# Patient Record
Sex: FEMALE | Race: WHITE | NOT HISPANIC OR LATINO | Employment: PART TIME | URBAN - METROPOLITAN AREA
[De-identification: names, ages, dates, MRNs, and addresses within clinical notes are randomized per-mention and may not be internally consistent; named-entity substitution may affect disease eponyms.]

---

## 2023-01-23 ENCOUNTER — PRENATAL OFFICE VISIT (OUTPATIENT)
Dept: NURSING | Facility: CLINIC | Age: 33
End: 2023-01-23

## 2023-01-23 VITALS — HEIGHT: 67 IN | BODY MASS INDEX: 25.11 KG/M2 | WEIGHT: 160 LBS

## 2023-01-23 DIAGNOSIS — Z34.00 ENCOUNTER FOR SUPERVISION OF NORMAL FIRST PREGNANCY: Primary | ICD-10-CM

## 2023-01-23 DIAGNOSIS — Z23 NEED FOR TDAP VACCINATION: ICD-10-CM

## 2023-01-23 PROBLEM — H54.7 VISION IMPAIRMENT: Status: ACTIVE | Noted: 2021-05-11

## 2023-01-23 PROBLEM — Z87.891 FORMER TOBACCO USE: Status: ACTIVE | Noted: 2019-03-14

## 2023-01-23 PROBLEM — F90.9 ADHD (ATTENTION DEFICIT HYPERACTIVITY DISORDER): Status: ACTIVE | Noted: 2020-09-22

## 2023-01-23 PROCEDURE — 99207 PR NO CHARGE NURSE ONLY: CPT

## 2023-01-23 RX ORDER — LORAZEPAM 0.5 MG/1
TABLET ORAL
COMMUNITY
Start: 2021-03-29 | End: 2023-01-23

## 2023-01-23 RX ORDER — PROPRANOLOL HYDROCHLORIDE 20 MG/1
20 TABLET ORAL
COMMUNITY
Start: 2022-03-30 | End: 2023-01-23

## 2023-01-23 RX ORDER — ESCITALOPRAM OXALATE 10 MG/1
10 TABLET ORAL
COMMUNITY
Start: 2022-01-17 | End: 2023-01-23

## 2023-01-23 RX ORDER — DEXTROAMPHETAMINE SACCHARATE, AMPHETAMINE ASPARTATE MONOHYDRATE, DEXTROAMPHETAMINE SULFATE AND AMPHETAMINE SULFATE 5; 5; 5; 5 MG/1; MG/1; MG/1; MG/1
20 CAPSULE, EXTENDED RELEASE ORAL DAILY
Status: ON HOLD | COMMUNITY
End: 2023-09-11
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
## 2023-02-07 LAB
ABO/RH(D): NORMAL
ANTIBODY SCREEN: NEGATIVE
SPECIMEN EXPIRATION DATE: NORMAL

## 2023-02-08 ENCOUNTER — ANCILLARY PROCEDURE (OUTPATIENT)
Dept: ULTRASOUND IMAGING | Facility: CLINIC | Age: 33
End: 2023-02-08
Payer: COMMERCIAL

## 2023-02-08 ENCOUNTER — PRENATAL OFFICE VISIT (OUTPATIENT)
Dept: OBGYN | Facility: CLINIC | Age: 33
End: 2023-02-08
Payer: COMMERCIAL

## 2023-02-08 ENCOUNTER — TRANSCRIBE ORDERS (OUTPATIENT)
Dept: MATERNAL FETAL MEDICINE | Facility: CLINIC | Age: 33
End: 2023-02-08

## 2023-02-08 VITALS
WEIGHT: 171 LBS | HEART RATE: 78 BPM | OXYGEN SATURATION: 100 % | DIASTOLIC BLOOD PRESSURE: 62 MMHG | SYSTOLIC BLOOD PRESSURE: 103 MMHG | BODY MASS INDEX: 26.78 KG/M2

## 2023-02-08 DIAGNOSIS — O26.90 PREGNANCY RELATED CONDITION, ANTEPARTUM: Primary | ICD-10-CM

## 2023-02-08 DIAGNOSIS — O09.511 SUPERVISION OF HIGH RISK ELDERLY PRIMIGRAVIDA IN FIRST TRIMESTER: Primary | ICD-10-CM

## 2023-02-08 DIAGNOSIS — Z34.00 ENCOUNTER FOR SUPERVISION OF NORMAL FIRST PREGNANCY: ICD-10-CM

## 2023-02-08 LAB
ALBUMIN UR-MCNC: NEGATIVE MG/DL
APPEARANCE UR: CLEAR
BILIRUB UR QL STRIP: NEGATIVE
COLOR UR AUTO: YELLOW
ERYTHROCYTE [DISTWIDTH] IN BLOOD BY AUTOMATED COUNT: 13 % (ref 10–15)
GLUCOSE UR STRIP-MCNC: NEGATIVE MG/DL
HCT VFR BLD AUTO: 37.3 % (ref 35–47)
HGB BLD-MCNC: 12.8 G/DL (ref 11.7–15.7)
HGB UR QL STRIP: NEGATIVE
KETONES UR STRIP-MCNC: NEGATIVE MG/DL
LEUKOCYTE ESTERASE UR QL STRIP: NEGATIVE
MCH RBC QN AUTO: 30.9 PG (ref 26.5–33)
MCHC RBC AUTO-ENTMCNC: 34.3 G/DL (ref 31.5–36.5)
MCV RBC AUTO: 90 FL (ref 78–100)
NITRATE UR QL: NEGATIVE
PH UR STRIP: 7 [PH] (ref 5–7)
PLATELET # BLD AUTO: 270 10E3/UL (ref 150–450)
RBC # BLD AUTO: 4.14 10E6/UL (ref 3.8–5.2)
SP GR UR STRIP: 1.01 (ref 1–1.03)
UROBILINOGEN UR STRIP-ACNC: 0.2 E.U./DL
WBC # BLD AUTO: 9.1 10E3/UL (ref 4–11)

## 2023-02-08 PROCEDURE — 36415 COLL VENOUS BLD VENIPUNCTURE: CPT | Performed by: OBSTETRICS & GYNECOLOGY

## 2023-02-08 PROCEDURE — 86780 TREPONEMA PALLIDUM: CPT | Performed by: OBSTETRICS & GYNECOLOGY

## 2023-02-08 PROCEDURE — 87340 HEPATITIS B SURFACE AG IA: CPT | Performed by: OBSTETRICS & GYNECOLOGY

## 2023-02-08 PROCEDURE — 86803 HEPATITIS C AB TEST: CPT | Performed by: OBSTETRICS & GYNECOLOGY

## 2023-02-08 PROCEDURE — 86901 BLOOD TYPING SEROLOGIC RH(D): CPT | Performed by: OBSTETRICS & GYNECOLOGY

## 2023-02-08 PROCEDURE — 81003 URINALYSIS AUTO W/O SCOPE: CPT | Performed by: OBSTETRICS & GYNECOLOGY

## 2023-02-08 PROCEDURE — 99207 PR FIRST OB VISIT: CPT | Performed by: OBSTETRICS & GYNECOLOGY

## 2023-02-08 PROCEDURE — 86762 RUBELLA ANTIBODY: CPT | Performed by: OBSTETRICS & GYNECOLOGY

## 2023-02-08 PROCEDURE — 86850 RBC ANTIBODY SCREEN: CPT | Performed by: OBSTETRICS & GYNECOLOGY

## 2023-02-08 PROCEDURE — 76801 OB US < 14 WKS SINGLE FETUS: CPT | Performed by: OBSTETRICS & GYNECOLOGY

## 2023-02-08 PROCEDURE — 87389 HIV-1 AG W/HIV-1&-2 AB AG IA: CPT | Performed by: OBSTETRICS & GYNECOLOGY

## 2023-02-08 PROCEDURE — 85027 COMPLETE CBC AUTOMATED: CPT | Performed by: OBSTETRICS & GYNECOLOGY

## 2023-02-08 PROCEDURE — 86900 BLOOD TYPING SEROLOGIC ABO: CPT | Performed by: OBSTETRICS & GYNECOLOGY

## 2023-02-08 PROCEDURE — 87591 N.GONORRHOEAE DNA AMP PROB: CPT | Performed by: OBSTETRICS & GYNECOLOGY

## 2023-02-08 PROCEDURE — 87086 URINE CULTURE/COLONY COUNT: CPT | Performed by: OBSTETRICS & GYNECOLOGY

## 2023-02-08 PROCEDURE — 87491 CHLMYD TRACH DNA AMP PROBE: CPT | Performed by: OBSTETRICS & GYNECOLOGY
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
## 2023-02-09 LAB
C TRACH DNA SPEC QL NAA+PROBE: NEGATIVE
HBV SURFACE AG SERPL QL IA: NONREACTIVE
HCV AB SERPL QL IA: NONREACTIVE
HIV 1+2 AB+HIV1 P24 AG SERPL QL IA: NONREACTIVE
N GONORRHOEA DNA SPEC QL NAA+PROBE: NEGATIVE
RUBV IGG SERPL QL IA: 9.58 INDEX
RUBV IGG SERPL QL IA: POSITIVE
T PALLIDUM AB SER QL: NONREACTIVE

## 2023-02-10 LAB — BACTERIA UR CULT: NORMAL

## 2023-02-27 ENCOUNTER — PRE VISIT (OUTPATIENT)
Dept: MATERNAL FETAL MEDICINE | Facility: CLINIC | Age: 33
End: 2023-02-27
Payer: COMMERCIAL

## 2023-03-02 ENCOUNTER — OFFICE VISIT (OUTPATIENT)
Dept: MATERNAL FETAL MEDICINE | Facility: CLINIC | Age: 33
End: 2023-03-02
Attending: OBSTETRICS & GYNECOLOGY
Payer: COMMERCIAL

## 2023-03-02 ENCOUNTER — APPOINTMENT (OUTPATIENT)
Dept: LAB | Facility: CLINIC | Age: 33
End: 2023-03-02
Attending: OBSTETRICS & GYNECOLOGY
Payer: COMMERCIAL

## 2023-03-02 DIAGNOSIS — Z31.430 ENCOUNTER OF FEMALE FOR TESTING FOR GENETIC DISEASE CARRIER STATUS FOR PROCREATIVE MANAGEMENT: ICD-10-CM

## 2023-03-02 DIAGNOSIS — O09.511 PRIMIGRAVIDA OF ADVANCED MATERNAL AGE IN FIRST TRIMESTER: Primary | ICD-10-CM

## 2023-03-02 DIAGNOSIS — O26.90 PREGNANCY RELATED CONDITION, ANTEPARTUM: ICD-10-CM

## 2023-03-02 DIAGNOSIS — O09.511 PRIMIGRAVIDA OF ADVANCED MATERNAL AGE IN FIRST TRIMESTER: ICD-10-CM

## 2023-03-02 PROCEDURE — 96040 HC GENETIC COUNSELING, EACH 30 MINUTES: CPT

## 2023-03-02 PROCEDURE — 36415 COLL VENOUS BLD VENIPUNCTURE: CPT

## 2023-03-02 NOTE — PROGRESS NOTES
[facility]  Genetic Counseling Consult    Patient:  Zara Kaminski YOB: 1990   Date of Service:  3/02/23   MRN: [id number]    Zara was seen at the [facility] for genetic consultation. The indication for genetic counseling is advanced maternal age. The patient was accompanied to this visit by their partner, Ej. The patient and their accompanied individual is wearing a mask due to current [facility] policies.      The session was conducted in English.      IMPRESSION/ PLAN   1. Zara has not had genetic screening in this pregnancy but elected to have screening today.     2. During today's [facility] visit, Zara had a blood draw for NIPS through [employer]. The NIPS screens for trisomy 21, 18, and 13 and the patient opted to screen for sex chromosome aneuploidies, including reported fetal sex. Results are expected in 7-10 days. The patient will be called with results and if they do not answer they requested a detailed message with results on their voicemail, including the predicted fetal sex information.  Patient was informed that results, including fetal sex, will be available in [employer].     3. The patient and her partner also had a blood draw for carrier screening. Results are expected in 2-3 weeks. The patient will be called with results and if they do not answer they requested a detailed message with results on their voicemail. They requested that we call Zara with results once both are available. Consent to communicate was signed. Patient was informed that results will be available in [employer].     4. Zara did not have an ultrasound today.     5. Further recommendations include a level II ultrasound with MFM and maternal serum AFP only screening for neural tube defects, if desired (quad screen should NOT be performed). The level II ultrasound has not been scheduled, but was ordered today.    PREGNANCY HISTORY  "  /Parity:         Zara's pregnancy history is insignificant    CURRENT PREGNANCY   Current Age: 33 year old   Age at Delivery: 33 year old  SABINA: 2023, by Last Menstrual Period                                   Gestational Age: 13w0d  This pregnancy is a single gestation.   This pregnancy was conceived spontaneously.   Zara declines bleeding, complications, illnesses/fevers, and exposure concerns. She reports that she is taking Adderall XR, prenatal vitamins, iron, tylenol, and Unisom.    MEDICAL HISTORY   Zara s reported medical history is not expected to impact pregnancy management or risks to fetal development.       FAMILY HISTORY   A three-generation pedigree was obtained today and is scanned under the \"Media\" tab in Epic. The family history was reported by Zara and their partner.    The following significant findings were reported today:   Zara reports that her maternal aunt was diagnosed with breast cancer in her early thirties. She reports that her aunt passed away when Zara was in high school. She believes her mother has had genetic testing, but is unsure. She says she will follow-up with her mother. We discussed the family history of breast cancer briefly. Cancer most often occurs by chance, however some families seem to develop cancer more frequently than expected. Everyone has a risk to develop cancer, but individuals may be at an increased risk to develop cancer based on their family history.We discussed that breast cancer <50y is rare and can be associated with inherited cancer predisposition syndromes. Genetic counseling is available for cancer syndromes. Cancer family history, even without genetic testing, can change cancer screening recommendations for family members and aid in insurance coverage for access to them as well. The most informative individuals to complete cancer genetic counseling and genetic testing are those with a personal history of cancer or those closely " "related to the affected individuals. This may be Zara's mother. If the family wants more information they can contact the [facility] Cancer Risk Management Program ([phone]). Physicians can also make referrals at [email or web address] or, if within the [place] system, through [facility] referral for \"Cancer Risk Mgmt/Cancer Genetic Counseling\". Due to Zara's family history of breast cancer it may be reasonable to begin early screening mammograms. Screening mammograms are usually not recommended during pregnancy or while breast feeding including up to six months after. Zara is encouraged to discuss this family history with her medical provider to ensure that screening begins at an appropriate age.  Zara reports that her mother (70y) had surgery to remove her gallbladder.    Zara reports that her father has a thyroid goiter and had skin cancer diagnosed at 68y.    Ej (37y), Zara's partner, reports that he is healthy.    Ej reports that his mother (75y) has had skin cancer.    Ej reports that his father (74y) has cirrhosis of the liver.    Ej reports that his paternal half-sister  at 36 from complications of substance use disorder.    Otherwise, the reported family history is unremarkable for multiple miscarriages, stillbirths, birth defects, intellectual disabilities, known genetic conditions, and consanguinity.       CARRIER SCREENING   Expanded carrier screening is available to screen for autosomal recessive conditions and X-linked conditions in a large list of genes. Autosomal recessive conditions happen when a mutation has been inherited from the egg and sperm and include conditions like cystic fibrosis, thalassemia, hearing loss, spinal muscular atrophy, and more. X-linked conditions happen when a mutation has been inherited from the egg and include conditions like fragile X syndrome.  screening was also reviewed. [place]  " Screening    As part of our conversation on carrier screening and how common or rare these condition are, we discussed the patient is of - [place] and [place] ancestry and the partner is of [place] ancestry.    The patient elected to pursue carrier screening today. We discussed the following:     Carrier screening does not test the pregnancy but gives a risk assessment for the pregnancy and future pregnancies to have the condition    There are different size panels or list of conditions for carrier screening. The patient elected for [employer] comprehensive panel of 558 genes including fragile X syndrome    Some conditions cause health problems for carriers    Carrier screening does not test for all genetic and health conditions or risk factors    There are limitations to current technology and results may be updated at a later date    The results typically take 2-3 weeks. They will be available in EPIC and routed to the referring OB provider. The patient can view them in [employer] and the lab's patient portal.    The patient's partner also elected carrier screening for the [employer] comprehensive panel    If an individual is a carrier, family members could be as well. The patient is encouraged to share this information with relatives.    The lab will communicate the out-of-pocket cost with the patient and will also provide an option to switch to self-pay. The default is billing through insurance, and it is the patient's responsibility to respond to the communication if they would like to switch to self-pay.         RISK ASSESSMENT FOR CHROMOSOME CONDITIONS   We explained that the risk for fetal chromosome abnormalities increases with maternal age. We discussed specific features of common chromosome abnormalities, including Down syndrome, trisomy 13, trisomy 18, and sex chromosome trisomies.      At age 33 at midtrimester, the risk to have a baby with Down syndrome is 1 in 274.     At age 33 at midtrimester, the  risk to have a baby with any chromosome abnormality is 1 in 135.     Zara has not had genetic screening in this pregnancy but elected to have screening today.  She elected NIPT.    GENETIC TESTING OPTIONS   Genetic testing during a pregnancy includes screening and diagnostic procedures.      Screening tests are non-invasive which means no risk to the pregnancy and includes ultrasounds and blood work. The benefits and limitations of screening were reviewed. Screening tests provide a risk assessment (chance) specific to the pregnancy for certain fetal chromosome abnormalities but cannot definitively diagnose or exclude a fetal chromosome abnormality. Follow-up genetic counseling and consideration of diagnostic testing is recommended with any abnormal screening result. Diagnostic testing during a pregnancy is more certain and can test for more conditions. However, the tests do have a risk of miscarriage that requires careful consideration. These tests can detect fetal chromosome abnormalities with greater than 99% certainty. Results can be compromised by maternal cell contamination or mosaicism and are limited by the resolution of current genetic testing technology.     There is no screening or diagnostic test that detects all forms of birth defects or intellectual disability.     We discussed the following screening options:   Non-invasive prenatal testing (NIPT)    Also called cell-free DNA screening because it detects chromosomes from the placenta in the pregnant person's blood    Can be done any time after 10 weeks gestation    Screens for trisomy 21, trisomy 18, trisomy 13, and sex chromosome aneuploidies    Cannot screen for open neural tube defects, maternal serum AFP after 15 weeks is recommended      We discussed the following ultrasound options:  Comprehensive level II ultrasound (Fetal Anatomy Ultrasound)    Ultrasound done between 18-20 weeks gestation    Screens for major birth defects and markers for  aneuploidy (like trisomy 21 and trisomy 18)    Includes looking at the fetus/baby's growth, heart, organs (stomach, kidneys), placenta, and amniotic fluid      We discussed the following diagnostic options:   Chorionic villus sampling (CVS)    Invasive diagnostic procedure done between 10w0d and 13w6d    The procedure collects a small sample from the placenta for the purpose of chromosomal testing and/or other genetic testing    Diagnostic result; more than 99% sensitivity for fetal chromosome abnormalities    Cannot screen for open neural tube defects, maternal serum AFP after 15 weeks is recommended    Amniocentesis    Invasive diagnostic procedure done after 15 weeks gestation    The procedure collects a small sample of amniotic fluid for the purpose of chromosomal testing and/or other genetic testing    Diagnostic result; more than 99% sensitivity for fetal chromosome abnormalities    Testing for AFP in the amniotic fluid can test for open neural tube defects        It was a pleasure to be involved with Zara [facility]. Face-to-face time of the meeting was 45 minutes.    Gi Magallanes, GC, MS, [facility]  Certified and [place] Licensed Genetic Counselor  [facility]  Maternal Fetal Medicine  Office: [phone]  [facility]: [phone]   Fax: [phone]  [facility]

## 2023-03-09 ENCOUNTER — MEDICAL CORRESPONDENCE (OUTPATIENT)
Dept: HEALTH INFORMATION MANAGEMENT | Facility: CLINIC | Age: 33
End: 2023-03-09
Payer: COMMERCIAL

## 2023-03-09 ENCOUNTER — TELEPHONE (OUTPATIENT)
Dept: MATERNAL FETAL MEDICINE | Facility: CLINIC | Age: 33
End: 2023-03-09
Payer: COMMERCIAL

## 2023-03-09 DIAGNOSIS — O09.511 ADVANCED MATERNAL AGE, PRIMIGRAVIDA IN FIRST TRIMESTER, ANTEPARTUM: Primary | ICD-10-CM

## 2023-03-09 LAB — SCANNED LAB RESULT: NORMAL

## 2023-03-09 NOTE — TELEPHONE ENCOUNTER
March 9th, 2023    I called Zara and disclosed that the non-invasive prenatal testing (NIPT) we sent to [facility] failed. [facility] reports that it failed due to a laboratory processing issue and that these types of failures are not expected to be related to specimen collection or specimen-specific quality metrics.     I discussed that the sample can be redrawn and sent to [facility]. Additionally, some patients may choose to send to a different lab when a sample fails. [facility] should [facility] prefer. Otherwise, the order can be cancelled.    Zara is scheduled for a prenatal visit at [place] tomorrow. I said she could have it redrawn at that time should she desire.    I asked her to call me back to let me know her preference.    Gi Magallanes MS, [facility]  Licensed Genetic Counselor  [facility]  Pager: [phone]  Office: [phone]

## 2023-03-09 NOTE — TELEPHONE ENCOUNTER
March 9th, 2023    Zara returned my call and reported that she would like a redraw for NIPT that failed analysis at [facility]. She said she has an OB appointment tomorrow, and will plan to go to the outpatient lab before her appointment to have her blood drawn.    Gi Magallanes MS, [facility]  Licensed Genetic Counselor  [facility]  Pager: [phone]  Office: [phone]

## 2023-03-10 ENCOUNTER — PRENATAL OFFICE VISIT (OUTPATIENT)
Dept: OBGYN | Facility: CLINIC | Age: 33
End: 2023-03-10
Payer: COMMERCIAL

## 2023-03-10 ENCOUNTER — LAB (OUTPATIENT)
Dept: LAB | Facility: CLINIC | Age: 33
End: 2023-03-10
Payer: COMMERCIAL

## 2023-03-10 VITALS
SYSTOLIC BLOOD PRESSURE: 117 MMHG | WEIGHT: 174.3 LBS | BODY MASS INDEX: 27.3 KG/M2 | DIASTOLIC BLOOD PRESSURE: 73 MMHG | HEART RATE: 71 BPM

## 2023-03-10 DIAGNOSIS — O09.511 ADVANCED MATERNAL AGE, PRIMIGRAVIDA IN FIRST TRIMESTER, ANTEPARTUM: ICD-10-CM

## 2023-03-10 DIAGNOSIS — Z34.02 ENCOUNTER FOR SUPERVISION OF NORMAL FIRST PREGNANCY IN SECOND TRIMESTER: Primary | ICD-10-CM

## 2023-03-10 PROCEDURE — 36415 COLL VENOUS BLD VENIPUNCTURE: CPT

## 2023-03-10 PROCEDURE — 99207 PR PRENATAL VISIT: CPT | Performed by: OBSTETRICS & GYNECOLOGY

## 2023-03-10 NOTE — PROGRESS NOTES
14w1d overall feeling ok, nausea improving, but still taking unisom which helps.  Traveled to [place] since last visit and had a great time.  Had NIPT drawn, but lab processing error and had to be redrawn today.  Was not scheduled for level 2, will order today.  AFP next visit.  RTC 4 weeks  jlp

## 2023-03-15 ENCOUNTER — TELEPHONE (OUTPATIENT)
Dept: MATERNAL FETAL MEDICINE | Facility: CLINIC | Age: 33
End: 2023-03-15
Payer: COMMERCIAL

## 2023-03-15 LAB
SCANNED LAB RESULT: ABNORMAL
SCANNED LAB RESULT: NORMAL

## 2023-03-15 NOTE — TELEPHONE ENCOUNTER
March 15, 2023    I left a message for Zara regarding her NIPT and carrier screening results.     NIPT results indicate NO ANEUPLOIDY DETECTED for chromosomes 21, 18, 13, or the sex chromosomes (XY).     This puts her current pregnancy at low risk for Down syndrome, trisomy 18, trisomy 13 and sex chromosome abnormalities. This test is reported to have the following sensitivities: Down syndrome- >99.9%, trisomy 18- >99.9%, and trisomy 13- >99.9%. Although these results are reassuring, this does not replace a standard chromosome analysis from a chorionic villus sampling or amniocentesis.     Level II ultrasound is recommended, given AMA.     MSAFP is the appropriate second trimester screening test for open neural tube defects; the maternal quad screen is not recommended.    I reviewed the results of her and her partner, Ej's, carrier screening for the [employer] comprehensive carrier screening which assessed 558 genes. The couple was not found to be carriers for any of the same conditions. They are encouraged to share results with family members for their own consideration of carrier screening. Zara was found to be a carrier for [id number] conditions. 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    [id number] conditions (WNT10A: c.682T>A ([id number].[id number])):    These conditions  include autosomal recessive odonto-onycho-dermal dysplasia (OODD) and Wwfösr-Jhqqtb-Tjkdcrvq syndrome (SSPS) and autosomal dominant isolated tooth agenesis. People who are carriers of the autosomal recessive conditions may be at risk to develop the autosomal dominant condition.     OODD and SSPS refer to a spectrum of features associated with ectodermal dysplasia (ED), which causes abnormal development of the skin, hair, nails, teeth, and sweat glands. Isolated tooth agenesis results in the absence of one or more teeth, typically secondary teeth. Some individuals with tooth agenesis can have mild symptoms of ED.    This  particular variant is a low penetrance variant, meaning that not all individuals with this variant will have associated symptoms.     Since Ej was NOT identified to be a carrier of this condition, the residual reproductive risk for the autosomal recessive conditions is 1 in 131,600.    POLG-related conditions (c.752C>T ([id number])):    Mutations in this gene are associated with several different conditions with different inheritance patters. Recessive forms of this condition include Alpers-Huttenlocher syndrome (AHS), childhood myocerebrohepatopathy spectrum (MCHS),    myoclonic epilepsy myopathy sensory ataxia (MEMSA), ataxia neuropathy spectrum (ANS), and progressive external ophthalmoplegia (arPEO). The dominant form of the condition is progressive external ophthalmoplegia with mitochondrial DNA deletions (adPEO).    This variant is associated with autosomal recessive disease.     Symptoms can include seizures, psychomotor regression, liver failure, developmental delay, pancreatitis, hearing loss, ataxia, ophthalmoplegia, and others.     Since Zara was NOT identified to be a carrier of this condition, the couple's residual reproductive risk is 1 in 8,960.      Her results are available in her Epic chart for her primary OB to review. Zara and Ej can call me with any questions.    Gi Magallanes MS, [facility]  Licensed Genetic Counselor  [facility]  Pager: [phone]  Office: [phone]

## 2023-04-07 ENCOUNTER — PRENATAL OFFICE VISIT (OUTPATIENT)
Dept: MIDWIFE SERVICES | Facility: CLINIC | Age: 33
End: 2023-04-07
Payer: COMMERCIAL

## 2023-04-07 ENCOUNTER — OFFICE VISIT (OUTPATIENT)
Dept: MATERNAL FETAL MEDICINE | Facility: CLINIC | Age: 33
End: 2023-04-07
Attending: OBSTETRICS & GYNECOLOGY
Payer: COMMERCIAL

## 2023-04-07 ENCOUNTER — HOSPITAL ENCOUNTER (OUTPATIENT)
Dept: ULTRASOUND IMAGING | Facility: CLINIC | Age: 33
Discharge: HOME | End: 2023-04-07
Attending: OBSTETRICS & GYNECOLOGY
Payer: COMMERCIAL

## 2023-04-07 VITALS
HEART RATE: 73 BPM | BODY MASS INDEX: 28.58 KG/M2 | WEIGHT: 182.5 LBS | DIASTOLIC BLOOD PRESSURE: 72 MMHG | SYSTOLIC BLOOD PRESSURE: 113 MMHG

## 2023-04-07 DIAGNOSIS — Z36.3 ANTENATAL SCREENING FOR MALFORMATION USING ULTRASONICS: ICD-10-CM

## 2023-04-07 DIAGNOSIS — O09.512 AMA (ADVANCED MATERNAL AGE) PRIMIGRAVIDA 35+, SECOND TRIMESTER: Primary | ICD-10-CM

## 2023-04-07 DIAGNOSIS — D22.9 CHANGE IN SKIN MOLE: ICD-10-CM

## 2023-04-07 DIAGNOSIS — O09.511 PRIMIGRAVIDA OF ADVANCED MATERNAL AGE IN FIRST TRIMESTER: ICD-10-CM

## 2023-04-07 DIAGNOSIS — O44.42 LOW LYING PLACENTA NOS OR WITHOUT HEMORRHAGE, SECOND TRIMESTER: ICD-10-CM

## 2023-04-07 PROCEDURE — 76811 OB US DETAILED SNGL FETUS: CPT | Mod: 26 | Performed by: OBSTETRICS & GYNECOLOGY

## 2023-04-07 PROCEDURE — 99207 PR PRENATAL VISIT: CPT | Performed by: ADVANCED PRACTICE MIDWIFE

## 2023-04-07 PROCEDURE — 76811 OB US DETAILED SNGL FETUS: CPT

## 2023-04-07 RX ORDER — PRENATAL VIT/IRON FUM/FOLIC AC 27MG-0.8MG
1 TABLET ORAL DAILY
COMMUNITY

## 2023-04-07 NOTE — PROGRESS NOTES
18w1d  MD patient, unknowingly scheduled with [facility]. Here with Ej today after MFM US. Discussed results - unable to visualize spine due to fetal position and low lying placenta. Follow up scheduled for 5/5. Pt has concern about mole that is changing in appearance after nicking it with razor while shaving. Mole in groin area, appears benign. Derm referral entered as patient has many moles so would benefit from routine skin check. No contractions, cramping, LOF or bleeding. RTC in 4 weeks with MFM US. MML

## 2023-04-07 NOTE — PROGRESS NOTES
Please refer to ultrasound report under 'Imaging' Studies of 'Chart Review' tabs.    Chava Hernandez M.D.

## 2023-04-17 ENCOUNTER — TELEPHONE (OUTPATIENT)
Dept: MATERNAL FETAL MEDICINE | Facility: CLINIC | Age: 33
End: 2023-04-17
Payer: COMMERCIAL

## 2023-04-17 NOTE — TELEPHONE ENCOUNTER
4/17/2023    Zara called clinic and asked to speak with a genetic counselor regarding billing for her recent screening through [employer]. Zara and her  received bills over $1000 each for their carrier screening, and over $600 for NIPS.  Zara was able to contact [employer] and got the bill for NIPS switched to self pay for $99, but was told they cannot offer the self pay pricing for carrier screening. Zara did apply for financial assistance through [employer] as a part of that call but wanted assistance in clarifying the cost of their testing.  Genetic counseling will reach out to our [facility] lab representative on their behalf and follow up.     Ramsey Machado MS, [facility]  Licensed Genetic Counselor  Phone: [phone]  Pager: [phone]

## 2023-04-19 ENCOUNTER — TELEPHONE (OUTPATIENT)
Dept: MATERNAL FETAL MEDICINE | Facility: CLINIC | Age: 33
End: 2023-04-19
Payer: COMMERCIAL

## 2023-04-19 NOTE — TELEPHONE ENCOUNTER
"April 19, 2023    I called Zara to follow-up regarding the communication myself and Ramsey Machado received today from [employer]. Zara had called on Friday and talked to Ramsey Machado about large bills from [employer] for the cost of carrier screening for her and her partner in her pregnancy. Rasheed, our [employer] representative, emailed and said,    \"Zara Conteh [birth year] [id number]: On 3/8 we sent out a BI Range of $750-$800 via text [phone] and email [email or web address] (I ve submitted a request to bring her bill within the estimated BI range $750-$800) The patient had an outstanding deductible  Ej Burgess 5/27/1987 [id number]: We did NOT send out a BI, I ve submitted a request to honor the self pay $100 for him\"    I communicated this information to Zara. We discussed that the cost of her testing would go towards her deductible. Zara said she felt misled about the cost of testing. I apologized that she feels this way. We discussed that per the original genetic counseling note documentation, \"The lab will communicate the out-of-pocket cost with the patient and will also provide an option to switch to self-pay. The default is billing through insurance, and it is the patient's responsibility to respond to the communication if they would like to switch to self-pay.\" Zara said she never received the BI. I encouraged her to discuss with [employer] directly as the billing is not through [facility]. I asked our [employer] representative to provide Zara with records of the BI being sent to her, if possible.    I encouraged Zara to contact me with any questions.    Gi Magallanes MS, [facility]  Licensed Genetic Counselor   [facility] [place]  Pager: [phone]  Office: [phone]   "

## 2023-04-20 ENCOUNTER — TELEPHONE (OUTPATIENT)
Dept: MATERNAL FETAL MEDICINE | Facility: CLINIC | Age: 33
End: 2023-04-20
Payer: COMMERCIAL

## 2023-04-20 NOTE — TELEPHONE ENCOUNTER
"April 20, 2023    I asked our [employer] representative to provide Zara with records of the BI for carrier screening being sent to her, if possible. Our [employer] representative responded,    \"Hi Gi,     I reached out to my Lead for help and was able to honor the self pay $250. Billing was unable to share the screenshot with me. We texted ([phone]) and emailed ([email or web address]) on 3/8 at 4:47pm. The option to switch to self pay was closed late on 3/8, this is an error since her carrier reported out on 3/13. This is an out of the ordinary incident. The only thing that we could possibly link it to was the patient s NIPS failure that happened late in the day on 3/8? Apologies for the inconvenience and please let her know that she will be receiving a new statement in a few weeks. Hope this is helpful and please let me know if I can help with anything else.\"    I communicated this with Zara and apologized for the frustration she experienced regarding the billing process.    Gi Magallanes MS, [facility]  Licensed Genetic Counselor  [facility]  Pager: [phone]  Office: [phone]   "

## 2023-04-23 ENCOUNTER — HEALTH MAINTENANCE LETTER (OUTPATIENT)
Age: 33
End: 2023-04-23

## 2023-05-05 ENCOUNTER — PRENATAL OFFICE VISIT (OUTPATIENT)
Dept: OBGYN | Facility: CLINIC | Age: 33
End: 2023-05-05
Payer: COMMERCIAL

## 2023-05-05 ENCOUNTER — OFFICE VISIT (OUTPATIENT)
Dept: MATERNAL FETAL MEDICINE | Facility: CLINIC | Age: 33
End: 2023-05-05
Attending: OBSTETRICS & GYNECOLOGY
Payer: COMMERCIAL

## 2023-05-05 ENCOUNTER — HOSPITAL ENCOUNTER (OUTPATIENT)
Dept: ULTRASOUND IMAGING | Facility: CLINIC | Age: 33
Discharge: HOME | End: 2023-05-05
Attending: OBSTETRICS & GYNECOLOGY
Payer: COMMERCIAL

## 2023-05-05 VITALS
HEART RATE: 55 BPM | OXYGEN SATURATION: 100 % | BODY MASS INDEX: 29.54 KG/M2 | WEIGHT: 188.6 LBS | SYSTOLIC BLOOD PRESSURE: 109 MMHG | DIASTOLIC BLOOD PRESSURE: 64 MMHG

## 2023-05-05 DIAGNOSIS — O09.891 MEDICATION EXPOSURE DURING FIRST TRIMESTER OF PREGNANCY: Primary | ICD-10-CM

## 2023-05-05 DIAGNOSIS — Z34.02 ENCOUNTER FOR SUPERVISION OF NORMAL FIRST PREGNANCY IN SECOND TRIMESTER: Primary | ICD-10-CM

## 2023-05-05 PROCEDURE — 99207 PR PRENATAL VISIT: CPT | Performed by: OBSTETRICS & GYNECOLOGY

## 2023-05-05 PROCEDURE — 76816 OB US FOLLOW-UP PER FETUS: CPT | Mod: 26 | Performed by: OBSTETRICS & GYNECOLOGY

## 2023-05-05 PROCEDURE — 76816 OB US FOLLOW-UP PER FETUS: CPT

## 2023-05-05 NOTE — PROGRESS NOTES
Please see the imaging tab for details of the ultrasound performed today.    Jane Londono MD  Specialist in Maternal-Fetal Medicine

## 2023-06-02 ENCOUNTER — PRENATAL OFFICE VISIT (OUTPATIENT)
Dept: OBGYN | Facility: CLINIC | Age: 33
End: 2023-06-02
Payer: COMMERCIAL

## 2023-06-02 VITALS
DIASTOLIC BLOOD PRESSURE: 70 MMHG | HEART RATE: 56 BPM | SYSTOLIC BLOOD PRESSURE: 108 MMHG | WEIGHT: 190 LBS | OXYGEN SATURATION: 99 % | BODY MASS INDEX: 29.76 KG/M2

## 2023-06-02 DIAGNOSIS — Z34.02 ENCOUNTER FOR SUPERVISION OF NORMAL FIRST PREGNANCY IN SECOND TRIMESTER: Primary | ICD-10-CM

## 2023-06-02 LAB
GLUCOSE 1H P 50 G GLC PO SERPL-MCNC: 112 MG/DL (ref 70–129)
HGB BLD-MCNC: 11.8 G/DL (ref 11.7–15.7)
HOLD SPECIMEN: NORMAL

## 2023-06-02 PROCEDURE — 99207 PR PRENATAL VISIT: CPT | Performed by: OBSTETRICS & GYNECOLOGY

## 2023-06-02 PROCEDURE — 36415 COLL VENOUS BLD VENIPUNCTURE: CPT | Performed by: OBSTETRICS & GYNECOLOGY

## 2023-06-02 PROCEDURE — 82950 GLUCOSE TEST: CPT | Performed by: OBSTETRICS & GYNECOLOGY

## 2023-06-02 RX ORDER — BREAST PUMP
1 EACH MISCELLANEOUS CONTINUOUS PRN
Qty: 1 EACH | Refills: 0 | Status: SHIPPED | OUTPATIENT
Start: 2023-06-02

## 2023-06-02 RX ORDER — DEXTROAMPHETAMINE SACCHARATE, AMPHETAMINE ASPARTATE, DEXTROAMPHETAMINE SULFATE AND AMPHETAMINE SULFATE 2.5; 2.5; 2.5; 2.5 MG/1; MG/1; MG/1; MG/1
TABLET ORAL
Status: ON HOLD | COMMUNITY
Start: 2023-03-22 | End: 2023-09-11

## 2023-06-02 NOTE — ADDENDUM NOTE
Addended by: NEGRITO ULLOA on: 6/2/2023 02:11 PM     Modules accepted: Orders    
3/5 throughtout on extremities/grossly assessed due to

## 2023-06-02 NOTE — PROGRESS NOTES
Patient reports she is overall feeling ok.  Noting some increased nausea and vomiting the other morning at work.  Feels increased acid reflux that tums helps.  We discussed continued tums and if does not help enough can take famotidine 10mg twice daily.  Denies any contractions, vaginal bleeding, leaking fluid.  Normal fetal movement.    Peds- will look into  Leave paperwork- with check with HR and bring to future visit  Birth classes- considering izabela  Circ- no  Fd- breast, pump script given today  Next visit 6/30/23 with Dr. Hernandez.  Hermila Lopez MD

## 2023-06-29 NOTE — PROGRESS NOTES
30w1d  Doing well.  No complaints today.  Family member murdered last month, so struggling a bit.  Psychiatrist recommended low dose Lexapro, but she's not sure.  Discussed selective serotonin reuptake inhibitor use in pregnancy and recommended she consider starting it, as would also have increased risk of post-partum mood disorders.  Has tried to establish with a therapist, but appointments are at least a month apart.  Offered [facility] in the meantime due to recent trauma and she is interested.  Will send staff message and ask Montserrat to reach out.  Given form for labor and birth preferences.  Leave paperwork provided today.  Will send [employer] message with fax number.  Tdap today.  RTC 2w.  Gretchen Hernandez MD

## 2023-06-30 ENCOUNTER — PRENATAL OFFICE VISIT (OUTPATIENT)
Dept: OBGYN | Facility: CLINIC | Age: 33
End: 2023-06-30
Payer: COMMERCIAL

## 2023-06-30 ENCOUNTER — MYC MEDICAL ADVICE (OUTPATIENT)
Dept: OBGYN | Facility: CLINIC | Age: 33
End: 2023-06-30

## 2023-06-30 VITALS
SYSTOLIC BLOOD PRESSURE: 114 MMHG | BODY MASS INDEX: 30.38 KG/M2 | WEIGHT: 194 LBS | HEART RATE: 77 BPM | OXYGEN SATURATION: 100 % | DIASTOLIC BLOOD PRESSURE: 70 MMHG

## 2023-06-30 DIAGNOSIS — Z34.03 ENCOUNTER FOR SUPERVISION OF NORMAL FIRST PREGNANCY IN THIRD TRIMESTER: Primary | ICD-10-CM

## 2023-06-30 DIAGNOSIS — Z23 NEED FOR TDAP VACCINATION: ICD-10-CM

## 2023-06-30 PROCEDURE — 90471 IMMUNIZATION ADMIN: CPT | Performed by: OBSTETRICS & GYNECOLOGY

## 2023-06-30 PROCEDURE — 99207 PR PRENATAL VISIT: CPT | Performed by: OBSTETRICS & GYNECOLOGY

## 2023-06-30 PROCEDURE — 90715 TDAP VACCINE 7 YRS/> IM: CPT | Performed by: OBSTETRICS & GYNECOLOGY

## 2023-06-30 NOTE — PATIENT INSTRUCTIONS
You have been provided the My Labor and Birth Wishes document.  Please review at home and bring to your next prenatal visit. Bring this sheet to the hospital for your birth. Give copies to your care team members and support person.   Additional copies can be found here:  [email or web address]

## 2023-06-30 NOTE — TELEPHONE ENCOUNTER
PAPERWORK REQUEST    Form received on: 6/30/2023  How was form received: In Person  Preferred Provider: Gretchen Hernandez MD  Type of form: FMLA  Date needed by: Standard  What to do with form once completed: Please fax to # in [employer] message  Where was form placed?: provider basket  Has an HOA been signed? Not Applicable    Additional Notes: Placed in provider basket for signature

## 2023-06-30 NOTE — Clinical Note
Saw this patient today and she shared that she had family member murdered last month.  Really struggling.  Has tried to establish therapy, but appointments months apart.  Would appreciate working with you while waiting to get established.  Can you reach out?  David

## 2023-07-11 ENCOUNTER — OFFICE VISIT (OUTPATIENT)
Dept: BEHAVIORAL HEALTH | Facility: CLINIC | Age: 33
End: 2023-07-11
Payer: COMMERCIAL

## 2023-07-11 DIAGNOSIS — F43.21 ADJUSTMENT DISORDER WITH DEPRESSED MOOD: Primary | ICD-10-CM

## 2023-07-11 PROCEDURE — 90834 PSYTX W PT 45 MINUTES: CPT

## 2023-07-11 ASSESSMENT — ANXIETY QUESTIONNAIRES
4. TROUBLE RELAXING: MORE THAN HALF THE DAYS
2. NOT BEING ABLE TO STOP OR CONTROL WORRYING: SEVERAL DAYS
IF YOU CHECKED OFF ANY PROBLEMS ON THIS QUESTIONNAIRE, HOW DIFFICULT HAVE THESE PROBLEMS MADE IT FOR YOU TO DO YOUR WORK, TAKE CARE OF THINGS AT HOME, OR GET ALONG WITH OTHER PEOPLE: SOMEWHAT DIFFICULT
GAD7 TOTAL SCORE: 6
1. FEELING NERVOUS, ANXIOUS, OR ON EDGE: SEVERAL DAYS
6. BECOMING EASILY ANNOYED OR IRRITABLE: SEVERAL DAYS
3. WORRYING TOO MUCH ABOUT DIFFERENT THINGS: SEVERAL DAYS
5. BEING SO RESTLESS THAT IT IS HARD TO SIT STILL: NOT AT ALL
7. FEELING AFRAID AS IF SOMETHING AWFUL MIGHT HAPPEN: NOT AT ALL
GAD7 TOTAL SCORE: 6

## 2023-07-11 ASSESSMENT — PATIENT HEALTH QUESTIONNAIRE - PHQ9
SUM OF ALL RESPONSES TO PHQ QUESTIONS 1-9: 8
10. IF YOU CHECKED OFF ANY PROBLEMS, HOW DIFFICULT HAVE THESE PROBLEMS MADE IT FOR YOU TO DO YOUR WORK, TAKE CARE OF THINGS AT HOME, OR GET ALONG WITH OTHER PEOPLE: SOMEWHAT DIFFICULT
SUM OF ALL RESPONSES TO PHQ QUESTIONS 1-9: 8

## 2023-07-11 NOTE — PROGRESS NOTES
"[facility]  2023  Behavioral Health Clinician Progress Note    Patient Name: Zara Kaminski           Service Type:  Individual      Service Location:   Face to Face in Clinic     Session Start Time: 2:00 PM Session End Time: 2:50 PM      Session Length: 38 - 52      Attendees: Patient     Service Modality:  In-person    Visit Activities (Refresh list every visit): NEW and [facility] Only    Diagnostic Assessment Date: In progress  Treatment Plan Review Date: Not yet created  See Flowsheets for today's PHQ-9 and MAHAD-7 results  Previous PHQ-9:       2023    12:47 PM   PHQ-9 SCORE   PHQ-9 Total Score MyChart 8 (Mild depression)   PHQ-9 Total Score 8     Previous MAHAD-7:       2023    12:47 PM   MAHAD-7 SCORE   Total Score 6 (mild anxiety)   Total Score 6       JACK LEVEL:       No data to display                DATA  Extended Session (60+ minutes): No  Interactive Complexity: No  Crisis: No  State [facility] facility Patient: No    Treatment Objective(s) Addressed in This Session:  Target Behavior(s): grief processing, use of adaptive coping strategies     Will process grief in an adaptive manner  Develop/use mindfulness strategies throughout the day for distress tolerance    Current Stressors / Issues:  The reason for seeking services at this time is: \"Depression.\" Patient described this concern as associated with grief over the loss of her cousin who was murdered in May 2023. She is nearly 32 weeks pregnant with her first child.  [facility] began obtaining information for diagnostic assessment.     Interventions: empathetic listening, validated patient's emotional experience, provided education on  mental health and support resources    Progress on Treatment Objective(s) / Homework:  Treatment plan not yet defined, current objectives include establishing mental health services, identifying and strengthening coping strategies    Care Plan review completed: No    Medication Review:  No changes to " current psychiatric medication(s)  Was given lexapro prescription but has not yet started taking    Medication Compliance:  Yes    Changes in Health Issues:  Currently pregnant    Chemical Use Review:   Substance Use: Chemical use reviewed, no active concerns identified      Tobacco Use: No current tobacco use.      Assessment: Current Emotional / Mental Status (status of significant symptoms):  Risk status (Self / Other harm or suicidal ideation)  Patient denies a history of suicidal ideation, suicide attempts, self-injurious behavior, homicidal ideation, homicidal behavior, and and other safety concerns  Patient denies current fears or concerns for personal safety.  Patient denies current or recent suicidal ideation or behaviors.  Patient denies current or recent homicidal ideation or behaviors.  Patient denies current or recent self injurious behavior or ideation.  Patient denies other safety concerns.  A safety and risk management plan has not been developed at this time, however patient was encouraged to call [place] [phone] should there be a change in any of these risk factors.    Appearance:   Appropriate   Eye Contact:   Good   Psychomotor Behavior: Normal   Attitude:   Cooperative  Interested Pleasant  Orientation:   All  Speech   Rate / Production: Normal/ Responsive, emotional at times   Volume:  Normal   Mood:    Grieving  Affect:    Appropriate and tearful at times  Thought Content:  Clear   Thought Form:  Coherent  Goal Directed  Logical   Insight:    Good     Diagnoses:  1. Adjustment disorder with depressed mood    Provisional    Collateral Reports Completed:  Not Applicable    Plan: (Homework, other):  Follow-up scheduled on 7/28. Will complete diagnostic assessment and define treatment objectives at next visit. CD Recommendations: No indications of CD issues.     Montserrat Parrish, [facility], [facility]

## 2023-07-12 NOTE — PATIENT INSTRUCTIONS
Understanding  Labor  Going into labor before week 37 of pregnancy is called  labor.  labor can cause your baby to be born too soon. This can lead to health problems for your baby.     Before labor, the cervix is thick and closed.      In  labor, the cervix begins to efface (thin) and dilate (open).     Symptoms of  labor  If you think you re having  labor, get medical help right away. Contractions alone don t mean you re in  labor. What matters more are changes in your cervix. The cervix is the opening at the lower end of the uterus. Symptoms of  labor include:    4 or more contractions per hour    Strong contractions    Constant menstrual-like cramping    Low-back pain    Mucous or bloody fluid from the vagina    Bleeding or spotting in the second or third trimester  Evaluating  labor  Your healthcare provider will try to find out if you re in  labor or just having contractions. They may watch you for a few hours. You may have these tests:    Pelvic exam. This is to see if your cervix has effaced (thinned) and dilated (opened).    Uterine activity monitoring. This is used to detect contractions.    Fetal monitoring. This is done to check the health of your baby.    Ultrasound. This test looks at your baby s size and position.    Amniocentesis. This test checks how mature your baby s lungs are.  Caring for yourself at home  If you have  contractions, but your cervix is still thick and closed, your healthcare provider may tell you to:    Drink plenty of water.    Do fewer activities.    Rest in bed on your side.    Don't have intercourse or stimulate your nipples.  When to call your healthcare provider  Call your healthcare provider if you have any of these:    4 or more contractions per hour    Bag of water breaks    Bleeding or spotting  If you need hospital care   labor often means that you need hospital care. You may need  complete bed rest. You may have an IV (intravenous) line in your arm or hand. This is to give you fluids. You may be given pills or injections. These are done to help prevent contractions. You may get a medicine called a corticosteroid. This is to help your baby s lungs mature more quickly.  Are you at risk?  Any pregnant woman can have  labor. It may start for no reason. But these risk factors can increase your chances:    Past  labor or early birth    Smoking, drug, or alcohol use in pregnancy    A multiple pregnancy (twins or more)    Problems with the shape of the uterus    Bleeding during the pregnancy  The dangers of  birth  A baby born too soon may have health problems. This is because the baby didn t have enough time to grow. Some of the risks for your baby include:    Not breastfeeding or feeding well    Having immature lungs    Bleeding in the brain    Death  Reaching term  Your goal is to get as close to term (week 37 or later) as you can before giving birth. The closer you get to term, the higher your chance of having a healthy baby. Work with your healthcare provider. Together, you can take steps that may keep you from giving birth too early.  [employer] last reviewed this educational content on 10/1/2021    1833-7170 [employer]. All rights reserved. This information is not intended as a substitute for professional medical care. Always follow your healthcare professional's instructions.          Adapting to Pregnancy: Third Trimester  Although common during pregnancy, some discomforts may seem worse in the final weeks. Simple lifestyle changes can help. Take care of yourself. And ask your partner to help out with small tasks.   Limiting leg problems  Ways to combat leg issues:    Wear support hose all day.    Don't wear snug shoes or clothes that bind, such as tight pants and socks with elastic tops.    Sit with your feet and legs raised often.  Caring for your  breasts  Tips to follow include:    Wash with plain water. Don't use harsh soaps or rubbing alcohol. They may cause dryness.    Wear a nursing bra for extra support. It can also hide any leaks from your nipples.  Controlling hemorrhoids  Ways to prevent hemorrhoids include:     Eat foods that are high in fiber. Also exercise and drink enough fluids. This will reduce constipation and hemorrhoids.    Sleep and nap on your side. This limits pressure on the veins of your rectum.    Try not to stand or sit for long periods.  Controlling back pain  As your body changes during pregnancy, your back must work in new ways. Back pain has many causes. Physical changes in your body can strain your back and its supporting muscles. Also hormones increase during pregnancy. This can affect how your muscles and joints work together. All of these changes can lead to pain.   Pain may be felt in the upper or lower back. Pain is also common in the pelvis. Some pregnant women have sciatica. This is pain caused by pressure on the sciatic nerve running down the back of the leg. Ice or heat may help. Your provider may advise massage therapy or a chiropractor. Sleep on your left side with a pillow between your knees. Use a brace or support device. Ask your provider for specific tips and exercises to help control your back pain.   Tips to help you rest  Good rest and sleep will help you feel better. Here are some ideas:     Ask your partner to massage your shoulders, neck, or back.    Limit the errands you do each day.    Lie down in the afternoon or after work for a few minutes.    Take a warm bath before you go to sleep.    Drink warm milk or teas without caffeine.    Don't drink coffee, black tea, and cola.  Stopping heartburn    Don't eat spicy, greasy, fried, or acidic foods.    Eat small amounts more often. Eat slowly.   Wait 2 hours after eating before lying down.    Sleep with your upper body raised 6 inches.   Managing mood  swings  Ways to manage mood swings include:     Know that mood changes are normal.    Exercise often, but get plenty of rest.    Address any concerns and limit stress. Talking to your partner, other women, or your healthcare provider may help.   Dealing with urinary frequency  Tips to deal with having to urinate often include:     Drink plenty of water all day. But if you drink a lot in the evening, you may have to get up more in the night.    Limit coffee, black tea, and cola.  How daily issues affect your health  Many things in your daily life impact your health. This can include transportation, money problems, housing, access to food, and . If you can t get to medical appointments, you may not receive the care you need. When money is tight, it may be difficult to pay for medicines. And living far from a grocery store can make it hard to buy healthy food.   If you have concerns in any of these or other areas, talk with your healthcare team. They may know of local resources to assist you. Or they may have a staff person who can help.   [employer] last reviewed this educational content on 7/1/2021 2000-2023 [employer]. All rights reserved. This information is not intended as a substitute for professional medical care. Always follow your healthcare professional's instructions.        You have been provided the Any Day Now: Early Labor at Home document.    Additional copies can be found here:  [email or web address]  You have been provided the What I'd Wish I'd Known About Giving Birth document.    Additional copies can be found here:  [email or web address]

## 2023-07-14 ENCOUNTER — PRENATAL OFFICE VISIT (OUTPATIENT)
Dept: OBGYN | Facility: CLINIC | Age: 33
End: 2023-07-14
Payer: COMMERCIAL

## 2023-07-14 ENCOUNTER — HOSPITAL ENCOUNTER (OUTPATIENT)
Dept: ULTRASOUND IMAGING | Facility: CLINIC | Age: 33
Discharge: HOME | End: 2023-07-14
Attending: OBSTETRICS & GYNECOLOGY
Payer: COMMERCIAL

## 2023-07-14 ENCOUNTER — OFFICE VISIT (OUTPATIENT)
Dept: MATERNAL FETAL MEDICINE | Facility: CLINIC | Age: 33
End: 2023-07-14
Attending: OBSTETRICS & GYNECOLOGY
Payer: COMMERCIAL

## 2023-07-14 VITALS
DIASTOLIC BLOOD PRESSURE: 70 MMHG | HEART RATE: 56 BPM | SYSTOLIC BLOOD PRESSURE: 104 MMHG | TEMPERATURE: 97.4 F | OXYGEN SATURATION: 98 % | BODY MASS INDEX: 30.7 KG/M2 | WEIGHT: 196 LBS

## 2023-07-14 DIAGNOSIS — O36.60X0 FETAL MACROSOMIA AFFECTING MANAGEMENT OF MOTHER, ANTEPARTUM: ICD-10-CM

## 2023-07-14 DIAGNOSIS — O35.09X0 FETAL MACROCEPHALY AFFECTING ANTEPARTUM CARE OF MOTHER, SINGLE OR UNSPECIFIED FETUS: Primary | ICD-10-CM

## 2023-07-14 DIAGNOSIS — O09.891 MEDICATION EXPOSURE DURING FIRST TRIMESTER OF PREGNANCY: Primary | ICD-10-CM

## 2023-07-14 DIAGNOSIS — O09.891 MEDICATION EXPOSURE DURING FIRST TRIMESTER OF PREGNANCY: ICD-10-CM

## 2023-07-14 DIAGNOSIS — F32.9 CURRENT EPISODE OF MAJOR DEPRESSIVE DISORDER WITHOUT PRIOR EPISODE, UNSPECIFIED DEPRESSION EPISODE SEVERITY: ICD-10-CM

## 2023-07-14 DIAGNOSIS — Z34.03 ENCOUNTER FOR SUPERVISION OF NORMAL FIRST PREGNANCY IN THIRD TRIMESTER: Primary | ICD-10-CM

## 2023-07-14 PROCEDURE — 76816 OB US FOLLOW-UP PER FETUS: CPT

## 2023-07-14 PROCEDURE — 99207 PR PRENATAL VISIT: CPT | Performed by: OBSTETRICS & GYNECOLOGY

## 2023-07-14 PROCEDURE — 76816 OB US FOLLOW-UP PER FETUS: CPT | Mod: 26 | Performed by: OBSTETRICS & GYNECOLOGY

## 2023-07-14 RX ORDER — ESCITALOPRAM OXALATE 5 MG/1
5 TABLET ORAL DAILY
Qty: 90 TABLET | Refills: 3 | Status: SHIPPED | OUTPATIENT
Start: 2023-07-14 | End: 2023-10-12

## 2023-07-14 NOTE — NURSING NOTE
Patient reports positive fetal movement, no pain, no contractions, leaking of fluid, or bleeding. Patient denies headache, visual changes, nausea/vomiting, epigastric pain related to preeclampsia.  Education provided to patient on counting your baby movements.  SBAR given to SHANELL DANG, see their note in Epic.  Liz Guzman RN

## 2023-07-14 NOTE — PROGRESS NOTES
Return OB visit    Subjective:  Patient reports active fetal movement, no vaginal bleeding or leaking fluid. She denies contractions. She has no concerns today, had MFM US today (report pending) and baby was measuring 92%ile per patient report .    She met with Montserrat JASIEL and reports that her mood is stable but is planning to start Lexapro (risks/benefits of SSRIs previously reviewed with Dr. Hernandez) but hasn't been able to pick it up from pharmacy so Rx sent to mail order pharmacy        Objective:  /70 (BP Location: Left arm, Patient Position: Sitting, Cuff Size: Adult Regular)   Pulse 56   Temp 97.4  F (36.3  C)   Wt 88.9 kg (196 lb)   LMP 2022   SpO2 98%   BMI 30.70 kg/m     See OB flow sheet    Assessment and Plan    Zara Kaminski is a 33 year old  at 32w1d here for JJ visit, pregnancy complicated by AMA     This visit:  -Routine PNC   -Briefly reviewed option for eIOL at 39 weeks due to suspected fetal macrosomia but will wait for final US report before counseling patient     Next visit:  -Routine PNC     RTC in 2 weeks or sooner JORGITO Quijano MD

## 2023-07-14 NOTE — PROGRESS NOTES
"Please see \"Imaging\" tab under \"Chart Review\" for details of today's visit.    Gretchen Lancaster MD PhD  Maternal Fetal Medicine     "

## 2023-07-27 ASSESSMENT — COLUMBIA-SUICIDE SEVERITY RATING SCALE - C-SSRS
ATTEMPT LIFETIME: NO
1. HAVE YOU WISHED YOU WERE DEAD OR WISHED YOU COULD GO TO SLEEP AND NOT WAKE UP?: NO
6. HAVE YOU EVER DONE ANYTHING, STARTED TO DO ANYTHING, OR PREPARED TO DO ANYTHING TO END YOUR LIFE?: NO
2. HAVE YOU ACTUALLY HAD ANY THOUGHTS OF KILLING YOURSELF?: NO
TOTAL  NUMBER OF INTERRUPTED ATTEMPTS LIFETIME: NO
TOTAL  NUMBER OF ABORTED OR SELF INTERRUPTED ATTEMPTS LIFETIME: NO

## 2023-07-27 NOTE — PROGRESS NOTES
[facility]  2023  Behavioral Health Clinician Progress Note    Patient Name: Zara Kaminski           Service Type:  Individual      Service Location:   Face to Face in Clinic     Session Start Time: 3:00 PM Session End Time: 3:30 PM      Session Length: 16 - 37      Attendees: Patient     Service Modality:  In-person    Visit Activities (Refresh list every visit): [facility] Only    Diagnostic Assessment Date: Will complete at next visit if additional [facility] services are indicated  Treatment Plan Review Date: Not yet created  See Flowsheets for today's PHQ-9 and MAHAD-7 results  Previous PHQ-9:       2023    12:47 PM   PHQ-9 SCORE   PHQ-9 Total Score MyChart 8 (Mild depression)   PHQ-9 Total Score 8     Previous MAHAD-7:       2023    12:47 PM   MAHAD-7 SCORE   Total Score 6 (mild anxiety)   Total Score 6       JACK LEVEL:       No data to display                DATA  Extended Session (60+ minutes): No  Interactive Complexity: No  Crisis: No  [facility] Patient: No    Treatment Objective(s) Addressed in This Session:  Target Behavior(s): management of mental health symptoms, grief processing    Use adaptive cognitive and behavioral coping skills to promote mental wellbeing     Current Stressors / Issues:  Patient reported improvement in mental health symptoms. Described ongoing grief process - stated she does often compartmentalize these emotions. Discussed intentions regarding boundary setting with family after baby is born. Reflected on how she and her  could best support each other through transition to parenthood. [facility] offered suggestion for brief couples counseling to promote effective communication which patient was open to considering.     Interventions: empathetic listening, validated patient's emotional experience, provided education on  mental health and support resources    Progress on Treatment Objective(s) / Homework:  Treatment plan not yet defined, current  objectives include establishing mental health services, identifying and strengthening coping strategies    Care Plan review completed: No    Medication Review:  Starting lexapro    Medication Compliance:  Yes    Changes in Health Issues:  Currently pregnant    Chemical Use Review:   Substance Use: Chemical use reviewed, no active concerns identified      Tobacco Use: No current tobacco use.      Assessment: Current Emotional / Mental Status (status of significant symptoms):  Risk status (Self / Other harm or suicidal ideation)  Patient denies a history of suicidal ideation, suicide attempts, self-injurious behavior, homicidal ideation, homicidal behavior, and and other safety concerns  Patient denies current fears or concerns for personal safety.  Patient denies current or recent suicidal ideation or behaviors.  Patient denies current or recent homicidal ideation or behaviors.  Patient denies current or recent self injurious behavior or ideation.  Patient denies other safety concerns.  A safety and risk management plan has not been developed at this time, however patient was encouraged to call [place] [phone] should there be a change in any of these risk factors.    Appearance:   Appropriate   Eye Contact:   Good   Psychomotor Behavior: Normal   Attitude:   Cooperative  Interested Pleasant  Orientation:   All  Speech   Rate / Production: Normal/ Responsive   Volume:  Normal   Mood:    Stable mood  Affect:    Appropriate   Thought Content:  Clear   Thought Form:  Coherent  Goal Directed  Logical   Insight:    Good     Diagnoses:  1. Adjustment disorder with depressed mood    Provisional    Collateral Reports Completed:  Not Applicable    Plan: (Homework, other):  Follow-up scheduled on 8/15. Will complete DA at next visit if need for additional [facility] services are indicated. Placed referral for couples counseling and sent additional clinic suggestions via [employer]. CD Recommendations: No indications of CD issues.      Montserrat Parrish, [facility], [facility], [place]

## 2023-07-28 ENCOUNTER — PRENATAL OFFICE VISIT (OUTPATIENT)
Dept: OBGYN | Facility: CLINIC | Age: 33
End: 2023-07-28
Payer: COMMERCIAL

## 2023-07-28 ENCOUNTER — OFFICE VISIT (OUTPATIENT)
Dept: BEHAVIORAL HEALTH | Facility: CLINIC | Age: 33
End: 2023-07-28
Payer: COMMERCIAL

## 2023-07-28 VITALS
WEIGHT: 195.7 LBS | TEMPERATURE: 96.5 F | BODY MASS INDEX: 30.65 KG/M2 | HEART RATE: 84 BPM | SYSTOLIC BLOOD PRESSURE: 108 MMHG | DIASTOLIC BLOOD PRESSURE: 76 MMHG

## 2023-07-28 DIAGNOSIS — Z34.03 ENCOUNTER FOR SUPERVISION OF NORMAL FIRST PREGNANCY IN THIRD TRIMESTER: Primary | ICD-10-CM

## 2023-07-28 DIAGNOSIS — F43.21 ADJUSTMENT DISORDER WITH DEPRESSED MOOD: Primary | ICD-10-CM

## 2023-07-28 PROCEDURE — 90832 PSYTX W PT 30 MINUTES: CPT

## 2023-07-28 PROCEDURE — 99207 PR PRENATAL VISIT: CPT | Performed by: OBSTETRICS & GYNECOLOGY

## 2023-07-28 NOTE — PATIENT INSTRUCTIONS
Weeks 34 to 36 of Your Pregnancy: Care Instructions  Your belly has grown quite large. It's almost time to give birth! Your baby's lungs are almost ready to breathe air. The skull bones are firm enough to protect your baby's head. But they're soft enough to move down through the birth canal.    You might be wondering what to expect during labor. Because each birth is different, there's no way to know exactly what childbirth will be like for you. Talk to your doctor or midwife about any concerns you have.   You'll probably have a test for group B streptococcus (GBS). GBS is bacteria that can live in the vagina and rectum. GBS can make your baby sick after birth. If you test positive, you'll get antibiotics during labor.     Choose what type of pain relief you would like during labor.  You can choose from a few types, including medicine and non-medicine options. You may want to use several types of pain relief.     Know how medicines can help with pain during labor.  Some medicines lower anxiety and help with some of the pain. Others make your lower body numb so that you will feel less pain.     Tell your doctor about your pain medicine choice.  Do this before you start labor or very early in your labor. You may be able to change your mind during labor.     Learn about the stages of labor.    The first stage includes the early (latent) and active phases of labor. Contractions start in early labor. During active labor, contractions get stronger, last longer, and happen more often. And the cervix opens more rapidly.  The second stage starts when you're ready to push. During this stage, your baby is born.  During the third stage, you'll usually have a few more contractions to push out the placenta.   Follow-up care is a key part of your treatment and safety. Be sure to make and go to all appointments, and call your doctor if you are having problems. It's also a good idea to know your test results and keep a list of the  "medicines you take.  Where can you learn more?  Go to https://www.[facility].net/patiented  Enter B912 in the search box to learn more about \"Weeks 34 to 36 of Your Pregnancy: Care Instructions.\"  Current as of: 2022               Content Version: 13.7    6011-6750 [employer].   Care instructions adapted under license by your healthcare professional. If you have questions about a medical condition or this instruction, always ask your healthcare professional. [employer] disclaims any warranty or liability for your use of this information.      Group B Strep During Pregnancy: Care Instructions  Overview     Group B strep infection is caused by a type of bacteria. It's a different kind of bacteria than the kind that causes strep throat.  You may have this kind of bacteria in your body. Sometimes it may cause an infection, but most of the time it doesn't make you sick or cause symptoms. But if you pass the bacteria to your baby during the birth, it can cause serious health problems for your baby.  If you have this bacteria in your body, you will get antibiotics when you are in labor. Antibiotics help prevent problems for a  baby.  After birth, doctors will watch and may test your baby. If your baby tests positive for Group B strep, your baby will get antibiotics.  If you plan to breastfeed your baby, don't worry. It will be safe to breastfeed.  Follow-up care is a key part of your treatment and safety. Be sure to make and go to all appointments, and call your doctor if you are having problems. It's also a good idea to know your test results and keep a list of the medicines you take.  How can you care for yourself at home?  If your doctor has prescribed antibiotics, take them as directed. Do not stop taking them just because you feel better. You need to take the full course of antibiotics.  Tell your doctor if you are allergic to any antibiotic.  If you go into labor, or " "your water breaks, go to the hospital. Your doctor will give you antibiotics to help protect your baby from infection.  Tell the doctors and nurses if you have an allergy to penicillin.  Tell the doctors and nurses at the hospital that you tested positive for group B strep.  When should you call for help?   Call your doctor now or seek immediate medical care if:    You have symptoms of a urinary tract infection. These may include:  Pain or burning when you urinate.  A frequent need to urinate without being able to pass much urine.  Pain in the flank, which is just below the rib cage and above the waist on either side of the back.  Blood in your urine.  A fever.     You think you are in labor or your water has broken.     You have pain in your belly or pelvis.   Watch closely for changes in your health, and be sure to contact your doctor if you have any problems.  Where can you learn more?  Go to https://www.[employer]/patiented  Enter M001 in the search box to learn more about \"Group B Strep During Pregnancy: Care Instructions.\"  Current as of: October 31, 2022               Content Version: 13.7    1002-1892 [employer].   Care instructions adapted under license by your healthcare professional. If you have questions about a medical condition or this instruction, always ask your healthcare professional. [employer] disclaims any warranty or liability for your use of this information.      Circumcision in Infants: What to Expect at Home  Your Child's Recovery  After circumcision, your baby's penis may look red and swollen. It may have petroleum jelly and gauze on it. The gauze will likely come off when your baby urinates. Follow your doctor's directions about whether to put clean gauze back on your baby's penis or to leave the gauze off. If you need to remove gauze from the penis, use warm water to soak the gauze and gently loosen it.  The doctor may have used a Plastibell device to do " the circumcision. If so, your baby will have a plastic ring around the head of the penis. The ring should fall off by itself in 10 to 12 days.  A thin, yellow film may form over the area the day after the procedure. This is part of the normal healing process. It should go away in a few days.  Your baby may seem fussy while the area heals. It may hurt for your baby to urinate. This pain often gets better in 3 or 4 days. But it may last for up to 2 weeks.  Even though your baby's penis will likely start to feel better after 3 or 4 days, it may look worse. The penis often starts to look like it's getting better after about 7 to 10 days.  This care sheet gives you a general idea about how long it will take for your child to recover. But each child recovers at a different pace. Follow the steps below to help your child get better as quickly as possible.  How can you care for your child at home?  Activity    Let your baby rest as much as possible. Sleeping will help with recovery.     You can give your baby a sponge bath the day after surgery. Ask your doctor when it is okay to give your baby a bath.   Medicines    Your doctor will tell you if and when your child can restart any medicines. The doctor will also give you instructions about your child taking any new medicines.     Your doctor may recommend giving your baby acetaminophen (Tylenol) to help with pain after the procedure. Be safe with medicines. Give your child medicines exactly as prescribed. Call your doctor if you think your child is having a problem with a medicine.     Do not give your child two or more pain medicines at the same time unless the doctor told you to. Many pain medicines have acetaminophen, which is Tylenol. Too much acetaminophen (Tylenol) can be harmful.   Circumcision care    Always wash your hands before and after touching the circumcision area.     Gently wash your baby's penis with plain, warm water after each diaper change, and pat it  "dry. Do not use soap. Don't use hydrogen peroxide or alcohol. They can slow healing.     Do not try to remove the film that forms on the penis. The film will go away on its own.     Put plenty of petroleum jelly (such as Vaseline) on the circumcision area during each diaper change. This will prevent your baby's penis from sticking to the diaper while it heals.     Fasten your baby's diapers loosely so that there is less pressure on the penis while it heals.   Follow-up care is a key part of your child's treatment and safety. Be sure to make and go to all appointments, and call your doctor if your child is having problems. It's also a good idea to know your child's test results and keep a list of the medicines your child takes.  When should you call for help?   Call your doctor now or seek immediate medical care if:    Your baby has a fever over 100.4 F.     Your baby is extremely fussy or irritable, has a high-pitched cry, or refuses to eat.     Your baby does not have a wet diaper within 12 hours after the circumcision.     You find a spot of bleeding larger than a 2-inch [place] from the incision.     Your baby has signs of infection. Signs may include severe swelling; redness; a red streak on the shaft of the penis; or a thick, yellow discharge.   Watch closely for changes in your child's health, and be sure to contact your doctor if:    A Plastibell device was used for the circumcision and the ring has not fallen off after 10 to 12 days.   Where can you learn more?  Go to https://www.[employer].net/patiented  Enter S255 in the search box to learn more about \"Circumcision in Infants: What to Expect at Home.\"  Current as of: March 1, 2023               Content Version: 13.7    5833-6332 [employer].   Care instructions adapted under license by your healthcare professional. If you have questions about a medical condition or this instruction, always ask your healthcare professional. [employer], " [facility] disclaims any warranty or liability for your use of this information.        The Benefits of Breastmilk  Breastmilk is the best food for your baby. It has just the right amount of nutrients. It protects your baby's digestive system. It protects other body systems in your baby, and it helps your baby grow and develop.       Healthiest for baby  Breastmilk is the ideal food for babies. It has all the nutrients your baby needs to grow healthy and strong.    Breastmilk has these benefits:  It lowers the risk for sudden infant death syndrome (SIDS).  It gives babies a lower risk for ear infections in their first year. This is compared to babies who are fed formula.  It has DHA. This is a type of fat. It helps your baby s growing brain, nervous system, and eyes.  It is full of antibodies. These help your baby fight infection.  It lowers your baby's risk for lung illness.  It lowers the risk of diarrhea.  It lowers your baby s risk for allergies. Babies fed formula are more likely to have an allergy to cow's milk.  It lowers your baby s risk for colds and many other diseases.  It changes as your baby grows. This meets your baby's changing needs.  And it s important to know that:  Giving only breastmilk for the first 6 months gives your baby more of these benefits.  Giving breastmilk plus solid food from 6 months to 1 year or more gives more benefits.   babies have fewer long-term health problems when they grow up. These problems include diabetes and obesity.  Breastfeeding gives contact that your baby loves. Spending time skin-to-skin with you is calming and comforting.  Healthiest for mom  For many people, breastfeeding is a good experience. It creates a strong bond between mother and baby. People who breastfeed also get health benefits. Some benefits for you include:   You can know that your baby is growing healthy and strong because of your milk.  Breastmilk is convenient. It's free and clean. It's  always at the right temperature.  Breastfeeding burns calories. This can help you lose pregnancy weight faster.  Breastfeeding releases hormones that contract the uterus. This helps the uterus return to its normal size after childbirth.  Mothers who breastfeed have a lower risk for ovarian and breast cancers.  Some studies have found that breastfeeding may reduce a person's risk for type 2 diabetes and rheumatoid arthritis. It may reduce the risk of cardiovascular disease. This includes high blood pressure and high cholesterol.  Breastfeeding every day delays the return of your menstrual period. This can help extend the time between pregnancies.  Many people can help you learn to breastfeed. A lactation consultant can help. This is a healthcare provider who is trained to help you breastfeed. Your nurse, midwife, nurse practitioner, obstetrician, pediatrician, or family practice healthcare provider can also help you learn about breastfeeding.   What does it mean to give only breastmilk?   Giving only breastmilk for at least the first 6 months of life is best for your baby. If you need to be away from your baby, you can express breastmilk. This means pumping milk from your breast into a container. Talk with your healthcare provider about the best ways to feed this milk to your baby.    You should not give your baby water, sugar water, formula, or solids during their first 6 months unless your baby's healthcare provider tells you to.   Your baby s provider may tell you to give your baby vitamins, minerals, or medicines.  babies should be given vitamin D supplements. The provider will tell you the type and amount of vitamin D to give your baby.   What are the risks of not giving only breastmilk?   You now know the many of the benefits of breastfeeding. But you might not know why it's important to give only breastmilk for at least 6 months.   Your baby gets the best protection against health problems when they  get only breastmilk. Breastfeeding some of the time is good. But breastfeeding all of the time is best.   Giving your baby formula or other liquids may cause you to:  Have more problems breastfeeding  Make less milk  Be less confident in breastfeeding  Breastfeed less often  Stop breastfeeding before your baby is at least 12 months old                                                     When other choices may be needed  Giving only breastmilk is almost always the best thing to do. But your healthcare provider may have reasons to advise giving your baby formula or other liquids. They include:   Your baby has a health problem. There are cases where you may need to add formula or other liquids. This is often only for a short time. This may be the case if your baby has low blood sugar (hypoglycemia), loses body fluids (dehydration), or has high levels of bilirubin.  You have certain health problems. Some infections can be passed from your skin to your baby's skin. Or it can pass through your breastmilk. People with HIV/AIDS or untreated and contagious TB (tuberculosis) should not breastfeed. Women with active skin sores from chickenpox (varicella) can pump their breastmilk and feed their baby. But they should keep their baby s skin from touching any of the sores.  You use illegal drugs or drink alcohol. People who use illegal drugs should not breastfeed. If you are going to have a drink that has alcohol, it's best to do so just after you nurse or pump milk. Breastfeeding or pumping breast milk is OK at least 4 hours after your last drink. That way, your body will have some time to get rid of the alcohol before the next feeding and less of it will reach your baby. Long-term exposure to alcohol in breastmilk may affect your baby's health. It may also cause you to make less milk.  You take certain medicines. If you take any medicines, ask your baby s healthcare provider if you can breastfeed.  Kathe last reviewed this  educational content on 2022-2023 [employer]. All rights reserved. This information is not intended as a substitute for professional medical care. Always follow your healthcare professional's instructions.          What Is Group B Strep?  Group B strep (streptococcus) is a common type of bacteria. It can grow in the vagina, rectum, or urinary tract. It most often does not cause harm in adults. But in rare cases, a pregnant person who has group B strep can infect the baby during birth. This can cause serious illness in the . But treatment during labor reduces the risk of the baby becoming infected. And if a  gets group B strep, the infection can be treated.     Facts about group B strep   Learning more about group B strep can help you understand how testing and treatment can help. Here are some basic facts about group B strep:   It's not a sexually transmitted infection.  It's not the same as strep throat. (That is caused by group A strep.)  It often has no symptoms. It may cause no problems in adults.  Test results can be misleading. They may be negative one week and positive the next week.  Group B strep can be spread to the baby during vaginal delivery. It can't be passed during  (surgical) birth.  A person with group B strep rarely infects the . (Infection happens only about 1% to 2% of the time.)  When a person is treated during labor and delivery, the baby almost never becomes infected.  Certain factors during pregnancy increase the risk of a baby becoming infected.  Possible effects on your baby   Group B strep can infect the blood. It can also cause inflammation of the baby s lungs, brain, or spinal cord. Long-term effects can include blindness, deafness, mental retardation, or cerebral palsy. And in rare cases, infection causes death. Infection is most often found soon after the baby is born.   How your baby may become infected   Group B strep often  lives in the vagina or rectum. If the amniotic sac breaks early, bacteria from the vagina can travel to the uterus, reaching the baby. Or, while passing through the birth canal, the baby can come in contact with the bacteria. In rare cases, group B strep can be passed to the baby after delivery. This is called late-onset group B strep. The source of this type of infection is not well-understood. But some experts believe that it happens if the baby is exposed to group B strep in the home, from the parents or siblings, or in the community.   What increases the risk?   Certain things increase the chance that a baby will be infected. They include:  Breaking or leaking of the amniotic sac before 37 weeks of pregnancy  Labor before 37 weeks of pregnancy  Breaking of the amniotic sac more than 18 hours before labor starts  Fever during labor  A urinary tract infection with group B strep at any point in the pregnancy  Having a previous baby born with a group B strep infection  Kathe last reviewed this educational content on 6/1/2022 2000-2023 [employer]. All rights reserved. This information is not intended as a substitute for professional medical care. Always follow your healthcare professional's instructions.

## 2023-07-28 NOTE — PROGRESS NOTES
34w1d feeling good, no c/o.  Lots of mvmt.  Discussed scheduling us at 36w for growth at last visit, but never received a call to schedule.  Order is in, will forward msg to schedulers.  GBS next visit.  RTC 2 weeks  jlp

## 2023-07-28 NOTE — Clinical Note
Cedric arreola, can someone call Zara to schedule a growth us to coordinate with her next prenatal visit?  The order was placed 2 weeks ago, but no one has called her yet.  Thanks  dl
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
## 2023-08-11 ENCOUNTER — ANCILLARY PROCEDURE (OUTPATIENT)
Dept: ULTRASOUND IMAGING | Facility: CLINIC | Age: 33
End: 2023-08-11
Attending: OBSTETRICS & GYNECOLOGY
Payer: COMMERCIAL

## 2023-08-11 ENCOUNTER — PRENATAL OFFICE VISIT (OUTPATIENT)
Dept: OBGYN | Facility: CLINIC | Age: 33
End: 2023-08-11
Attending: OBSTETRICS & GYNECOLOGY
Payer: COMMERCIAL

## 2023-08-11 VITALS
TEMPERATURE: 97.3 F | WEIGHT: 196.7 LBS | DIASTOLIC BLOOD PRESSURE: 75 MMHG | SYSTOLIC BLOOD PRESSURE: 109 MMHG | HEART RATE: 83 BPM | OXYGEN SATURATION: 98 % | BODY MASS INDEX: 30.81 KG/M2

## 2023-08-11 DIAGNOSIS — Z34.03 ENCOUNTER FOR SUPERVISION OF NORMAL FIRST PREGNANCY IN THIRD TRIMESTER: Primary | ICD-10-CM

## 2023-08-11 DIAGNOSIS — Z34.03 ENCOUNTER FOR SUPERVISION OF NORMAL FIRST PREGNANCY IN THIRD TRIMESTER: ICD-10-CM

## 2023-08-11 PROCEDURE — 76816 OB US FOLLOW-UP PER FETUS: CPT | Mod: 26 | Performed by: OBSTETRICS & GYNECOLOGY

## 2023-08-11 PROCEDURE — 99207 PR PRENATAL VISIT: CPT | Performed by: OBSTETRICS & GYNECOLOGY

## 2023-08-11 PROCEDURE — 76816 OB US FOLLOW-UP PER FETUS: CPT

## 2023-08-11 ASSESSMENT — PATIENT HEALTH QUESTIONNAIRE - PHQ9: SUM OF ALL RESPONSES TO PHQ QUESTIONS 1-9: 3

## 2023-08-15 ENCOUNTER — OFFICE VISIT (OUTPATIENT)
Dept: BEHAVIORAL HEALTH | Facility: CLINIC | Age: 33
End: 2023-08-15
Payer: COMMERCIAL

## 2023-08-15 DIAGNOSIS — F43.23 ADJUSTMENT DISORDER WITH MIXED ANXIETY AND DEPRESSED MOOD: Primary | ICD-10-CM

## 2023-08-15 PROCEDURE — 90791 PSYCH DIAGNOSTIC EVALUATION: CPT | Mod: 95

## 2023-08-18 ENCOUNTER — PRENATAL OFFICE VISIT (OUTPATIENT)
Dept: OBGYN | Facility: CLINIC | Age: 33
End: 2023-08-18
Payer: COMMERCIAL

## 2023-08-18 VITALS
OXYGEN SATURATION: 98 % | BODY MASS INDEX: 31.32 KG/M2 | DIASTOLIC BLOOD PRESSURE: 83 MMHG | HEART RATE: 91 BPM | WEIGHT: 200 LBS | SYSTOLIC BLOOD PRESSURE: 124 MMHG

## 2023-08-18 DIAGNOSIS — Z34.03 ENCOUNTER FOR SUPERVISION OF NORMAL FIRST PREGNANCY IN THIRD TRIMESTER: Primary | ICD-10-CM

## 2023-08-18 PROCEDURE — 99207 PR PRENATAL VISIT: CPT | Performed by: OBSTETRICS & GYNECOLOGY

## 2023-08-18 RX ORDER — NALOXONE HYDROCHLORIDE 0.4 MG/ML
0.2 INJECTION, SOLUTION INTRAMUSCULAR; INTRAVENOUS; SUBCUTANEOUS
Status: CANCELLED | OUTPATIENT
Start: 2023-08-18

## 2023-08-18 RX ORDER — CITRIC ACID/SODIUM CITRATE 334-500MG
30 SOLUTION, ORAL ORAL ONCE
Status: CANCELLED | OUTPATIENT
Start: 2023-08-18 | End: 2023-08-18

## 2023-08-18 RX ORDER — OXYTOCIN/0.9 % SODIUM CHLORIDE 30/500 ML
100-340 PLASTIC BAG, INJECTION (ML) INTRAVENOUS CONTINUOUS PRN
Status: CANCELLED | OUTPATIENT
Start: 2023-08-18

## 2023-08-18 RX ORDER — MISOPROSTOL 100 UG/1
400 TABLET ORAL
Status: CANCELLED | OUTPATIENT
Start: 2023-08-18

## 2023-08-18 RX ORDER — ONDANSETRON 4 MG/1
4 TABLET, ORALLY DISINTEGRATING ORAL EVERY 6 HOURS PRN
Status: CANCELLED | OUTPATIENT
Start: 2023-08-18

## 2023-08-18 RX ORDER — OXYTOCIN 10 [USP'U]/ML
10 INJECTION, SOLUTION INTRAMUSCULAR; INTRAVENOUS
Status: CANCELLED | OUTPATIENT
Start: 2023-08-18

## 2023-08-18 RX ORDER — IBUPROFEN 600 MG/1
600 TABLET, FILM COATED ORAL EVERY 6 HOURS PRN
Qty: 60 TABLET | Refills: 0 | Status: ON HOLD | OUTPATIENT
Start: 2023-08-18 | End: 2023-10-21

## 2023-08-18 RX ORDER — CITRIC ACID/SODIUM CITRATE 334-500MG
30 SOLUTION, ORAL ORAL
Status: CANCELLED | OUTPATIENT
Start: 2023-08-18

## 2023-08-18 RX ORDER — ONDANSETRON 2 MG/ML
4 INJECTION INTRAMUSCULAR; INTRAVENOUS EVERY 6 HOURS PRN
Status: CANCELLED | OUTPATIENT
Start: 2023-08-18

## 2023-08-18 RX ORDER — CARBOPROST TROMETHAMINE 250 UG/ML
250 INJECTION, SOLUTION INTRAMUSCULAR
Status: CANCELLED | OUTPATIENT
Start: 2023-08-18

## 2023-08-18 RX ORDER — METOCLOPRAMIDE HYDROCHLORIDE 5 MG/ML
10 INJECTION INTRAMUSCULAR; INTRAVENOUS EVERY 6 HOURS PRN
Status: CANCELLED | OUTPATIENT
Start: 2023-08-18

## 2023-08-18 RX ORDER — METOCLOPRAMIDE 5 MG/1
10 TABLET ORAL EVERY 6 HOURS PRN
Status: CANCELLED | OUTPATIENT
Start: 2023-08-18

## 2023-08-18 RX ORDER — SODIUM CHLORIDE, SODIUM LACTATE, POTASSIUM CHLORIDE, CALCIUM CHLORIDE 600; 310; 30; 20 MG/100ML; MG/100ML; MG/100ML; MG/100ML
INJECTION, SOLUTION INTRAVENOUS CONTINUOUS
Status: CANCELLED | OUTPATIENT
Start: 2023-08-18

## 2023-08-18 RX ORDER — OXYTOCIN/0.9 % SODIUM CHLORIDE 30/500 ML
340 PLASTIC BAG, INJECTION (ML) INTRAVENOUS CONTINUOUS PRN
Status: CANCELLED | OUTPATIENT
Start: 2023-08-18

## 2023-08-18 RX ORDER — METHYLERGONOVINE MALEATE 0.2 MG/ML
200 INJECTION INTRAVENOUS
Status: CANCELLED | OUTPATIENT
Start: 2023-08-18

## 2023-08-18 RX ORDER — ACETAMINOPHEN 325 MG/1
650 TABLET ORAL EVERY 4 HOURS PRN
Status: CANCELLED | OUTPATIENT
Start: 2023-08-18

## 2023-08-18 RX ORDER — NALOXONE HYDROCHLORIDE 0.4 MG/ML
0.4 INJECTION, SOLUTION INTRAMUSCULAR; INTRAVENOUS; SUBCUTANEOUS
Status: CANCELLED | OUTPATIENT
Start: 2023-08-18

## 2023-08-18 RX ORDER — PROCHLORPERAZINE 25 MG
25 SUPPOSITORY, RECTAL RECTAL EVERY 12 HOURS PRN
Status: CANCELLED | OUTPATIENT
Start: 2023-08-18

## 2023-08-18 RX ORDER — PROCHLORPERAZINE MALEATE 5 MG
10 TABLET ORAL EVERY 6 HOURS PRN
Status: CANCELLED | OUTPATIENT
Start: 2023-08-18

## 2023-08-18 RX ORDER — AMOXICILLIN 250 MG
1 CAPSULE ORAL DAILY
Qty: 100 TABLET | Refills: 0 | Status: SHIPPED | OUTPATIENT
Start: 2023-08-18

## 2023-08-18 RX ORDER — ACETAMINOPHEN 325 MG/1
650 TABLET ORAL EVERY 6 HOURS PRN
Qty: 100 TABLET | Refills: 0 | Status: ON HOLD | OUTPATIENT
Start: 2023-08-18 | End: 2023-10-21

## 2023-08-18 RX ORDER — IBUPROFEN 200 MG
800 TABLET ORAL
Status: CANCELLED | OUTPATIENT
Start: 2023-08-18 | End: 2023-08-23

## 2023-08-18 RX ORDER — MISOPROSTOL 200 UG/1
800 TABLET ORAL
Status: CANCELLED | OUTPATIENT
Start: 2023-08-18

## 2023-08-18 RX ORDER — KETOROLAC TROMETHAMINE 30 MG/ML
30 INJECTION, SOLUTION INTRAMUSCULAR; INTRAVENOUS
Status: CANCELLED | OUTPATIENT
Start: 2023-08-18 | End: 2023-08-23

## 2023-08-18 NOTE — PROGRESS NOTES
37w1d  Doing well, but a little stressed.  Understands conversation last visit re risks of bigger baby, including 4th degree laceration, shoulder dystocia, etc, but was a bit overwhelmed by the conversation.  Not interested in , but hoping to spontaneously labor at some point soon.  Normal FM.    GBS: Negative  Hemoglobin   Date Value Ref Range Status   2023 11.8 11.7 - 15.7 g/dL Final   04/15/2010 12.9 11.7 - 15.7 g/dL Final   ]    Breast pump rx:  done  Labor orders: signed and held  Birth plan: nothing specific.  Epidural!  Length of stay: discussed  Disability paperwork: completed  Resident involvement: discussed and agrees.  Discharge meds done : YES    RTC weekly until delivery.  Gretchen Hernandez MD

## 2023-08-20 NOTE — PROGRESS NOTES
Return OB visit    Subjective:  Patient reports active fetal movement, no vaginal bleeding or leaking fluid. She denies contractions. She did have a growth US prior to today's appointment due to suspected fetal microsomia.      Objective:  /75 (BP Location: Left arm, Patient Position: Sitting, Cuff Size: Adult Regular)   Pulse 83   Temp 97.3  F (36.3  C)   Wt 89.2 kg (196 lb 11.2 oz)   LMP 2022   SpO2 98%   BMI 30.81 kg/m     See OB flow sheet    Obstetrical Ultrasound Report  OB U/S Follow Up > 14 Weeks - Transabdominal  [facility]   Referring physician: Nubia Quijano MD  Sonographer: Cass Ortega RDMS  Indication:  F/U Growth; Macrosomia     Dating (mm/dd/yyyy):   LMP:  2022.               EDC:  Sep 7, 2023        GA by LMP:   36w1d  Current Scan On (mm/dd/yyyy):  2023                       EDC:   2023        GA by Current Scan:   38w2d  The calculation of the gestational age by current scan was based on BPD, HC, AC and FL.     Anatomy Scan:  Conn gestation.  Visualized: 4 Chamber Heart, Stomach, Kidneys, and Bladder.  Biometry:  BPD 9.4 cm 38w2d 96.4%   HC 33.6 cm 38w3d 76.6%   AC 36.5 cm 40w3d >99%   FL 7.0 cm 35w5d 34.3%   EFW (lbs/oz) 8 lbs               1ozs       EFW (g) 3647 g 98.3%        Fetal heart rate: 142 bpm  Fetal presentation: Cephalic  Amniotic fluid: 7.36cm MVP  Placenta: Anterior , no previa, > 2 cm from internal os     Maternal Anatomy:  Right adnexa: wnl  Left adnexa: wnl     Impression:      EFW by today's ultrasound is 3647grams, which is the 98%tile.  AC>HC.  Normal MVP, vertex presentation.     Kayla Mcghee MD    Assessment and Plan    Zara Kaminski is a 33 year old  at 36w1d here for JJ visit, pregnancy complicated by AMA, suspected fetal macrosomia     This visit:  -GBS obtained  -We reviewed the results of her ultrasound and discussed the typical margin of error and fetal measurements and the possibility that  baby's weight could be overestimated or underestimated by the ultrasound today.  We also discussed that the majority of studies looking at complications related to fetal macrosomia are based on actual birth weight and not estimated fetal weight prior to delivery which further complicates the counseling in this situation.  -We discussed that there is an increased risk of fetal and maternal complications with large for gestational age babies and that the degree of risk increases with the size of the .  These risks include a higher risk of arrested labor resulting in an intrapartum  section, greater degree of perineal tearing including third and fourth degree lacerations, and increased risk of postpartum hemorrhage for the patient.  We also discussed the increased risk of fetal birth trauma including shoulder dystocia which typically resolves without permanent  sequela but can result in permanent nerve injury, fracture, or permanent neurologic injury or death of the .  We discussed that the alternative would be to perform a primary  section but that many C-sections would need to be performed for suspected fetal macrosomia to prevent 1 serious adverse outcome and that we typically do not make an absolute recommendation for  section unless estimated fetal weight at birth is greater than 5000 g or greater than 4500 g for pregnant women with diabetes.  The patient had is not diabetic.  We discussed that on average, fetuses grow about 200 g/week in the late third trimester so based today's ultrasound, we would estimate that baby would weigh ~4400g at her due date.  -Following this conversation, the patient would like to consider the option of primary  section but is leaning towards attempting a vaginal birth. We also discussed that  induction of labor is not indicated for suspected fetal macrosomia but that it would be very reasonable to proceed with an induction  of labor at 39 weeks gestation and she would like to further consider this option.     Next visit:  -Routine PNC     RTC in 1 week or sooner JORGITO Quijano MD

## 2023-08-23 ENCOUNTER — PRENATAL OFFICE VISIT (OUTPATIENT)
Dept: OBGYN | Facility: CLINIC | Age: 33
End: 2023-08-23
Payer: COMMERCIAL

## 2023-08-23 VITALS
HEART RATE: 96 BPM | BODY MASS INDEX: 30.96 KG/M2 | DIASTOLIC BLOOD PRESSURE: 83 MMHG | OXYGEN SATURATION: 100 % | SYSTOLIC BLOOD PRESSURE: 130 MMHG | WEIGHT: 197.7 LBS

## 2023-08-23 DIAGNOSIS — Z34.03 ENCOUNTER FOR SUPERVISION OF NORMAL FIRST PREGNANCY IN THIRD TRIMESTER: Primary | ICD-10-CM

## 2023-08-23 PROCEDURE — 99207 PR PRENATAL VISIT: CPT | Performed by: OBSTETRICS & GYNECOLOGY

## 2023-08-23 NOTE — PROGRESS NOTES
Here with Ej and now ready to discuss ultrasound results and delivery risks a bit more. Ultrasound with slightly larger AC to HC ratio and feels about 8+ lbs on today's visit. Discussed risk of shoulder dystocia and maneuvers we use, how babies can fall off labor course and would not do operative vag birth. c/s was discussed in detail including risk of bleeding, infection, damage to abdominal organs including bowel, bladder, blood vessels, nerves, and baby.   Recovery period/hosptial stay and restrictions discussed.  Pain medications after surgery were discussed. She has some anxiety prior to the ultrasound surrounding vaginal birth and they want some time to discuss and will email with delivery route decision. Discussed 39+ wks BE

## 2023-08-28 ENCOUNTER — VIRTUAL VISIT (OUTPATIENT)
Dept: BEHAVIORAL HEALTH | Facility: CLINIC | Age: 33
End: 2023-08-28
Attending: OBSTETRICS & GYNECOLOGY
Payer: COMMERCIAL

## 2023-08-28 DIAGNOSIS — F43.23 ADJUSTMENT DISORDER WITH MIXED ANXIETY AND DEPRESSED MOOD: Primary | ICD-10-CM

## 2023-08-28 PROCEDURE — 90832 PSYTX W PT 30 MINUTES: CPT | Mod: 95

## 2023-08-28 NOTE — PROGRESS NOTES
"[facility]  August 28, 2023  Behavioral Health Clinician Progress Note    Patient Name: Zara Kaminski           Service Type:  Individual      Service Location:   Face to Face in Clinic     Session Start Time: 8:40 AM  Session End Time: 8:58 AM      Session Length: 16 - 37      Attendees: Patient     Service Modality:  Phone Visit:      Provider verified identity through the following two step process.  Patient provided:  Patient is known previously to provider    Telephone Visit: The patient's condition can be safely assessed and treated via synchronous audio telemedicine encounter.      Reason for Audio Telemedicine Visit: Patient convenience (e.g. access to timely appointments / distance to available provider)    Originating Site (Patient Location): Patient's home    Distant Site (Provider Location): [facility] [place]    Consent:  The patient/guardian has verbally consented to:     1. The potential risks and benefits of telemedicine (telephone visit) versus in person care;    The patient has been notified of the following:      \"We have found that certain health care needs can be provided without the need for a face to face visit.  This service lets us provide the care you need with a phone conversation.       I will have full access to your [facility] medical record during this entire phone call.   I will be taking notes for your medical record.      Since this is like an office visit, we will bill your insurance company for this service.       There are potential benefits and risks of telephone visits (e.g. limits to patient confidentiality) that differ from in-person visits.?Confidentiality still applies for telephone services, and nobody will record the visit.  It is important to be in a quiet, private space that is free of distractions (including cell phone or other devices) during the visit.??      If during the course of the call I believe a telephone visit " "is not appropriate, you will not be charged for this service\"     Consent has been obtained for this service by care team member: Yes     Visit Activities (Refresh list every visit): [facility] Only and Phone Encounter    Diagnostic Assessment Date: 8/15/23  Treatment Plan Review Date: Created today  See Flowsheets for today's PHQ-9 and MAHAD-7 results  Previous PHQ-9:       2023    12:47 PM 2023    11:40 AM   PHQ-9 SCORE   PHQ-9 Total Score MyChart 8 (Mild depression)    PHQ-9 Total Score 8 3     Previous MAHAD-7:       2023    12:47 PM   MAHAD-7 SCORE   Total Score 6 (mild anxiety)   Total Score 6       JACK LEVEL:       No data to display                DATA  Extended Session (60+ minutes): No  Interactive Complexity: No  Crisis: No  [facility] Patient: No    Treatment Objective(s) Addressed in This Session:  Target Behavior(s):  management of mental health symptoms during pregnancy    Anxiety: resolve ambivalence about scheduling     Current Stressors / Issues:  Patient reported she's been feeling stressed about birth plan, as she considers whether she wants to schedule a . [facility] and patient discussed pros and cons contributing to ambivalence.     Interventions: empathetic listening, validation, assisted in reflecting on ambivalence    Progress on Treatment Objective(s) / Homework:  Minimal progress - ACTION (Actively working towards change); Intervened by reinforcing change plan / affirming steps taken    Also provided psychoeducation about behavioral health condition, symptoms, and treatment options    Assessments completed prior to visit:  PHQ9:       2023    12:47 PM 2023    11:40 AM   PHQ-9 SCORE   PHQ-9 Total Score MyChart 8 (Mild depression)    PHQ-9 Total Score 8 3     GAD7:       2023    12:47 PM   MAHAD-7 SCORE   Total Score 6 (mild anxiety)   Total Score 6     PROMIS 10-Global Health (all questions and answers displayed):       2023    12:58 PM   PROMIS 10   In general, " would you say your health is: Good   In general, would you say your quality of life is: Very good   In general, how would you rate your physical health? Good   In general, how would you rate your mental health, including your mood and your ability to think? Fair   In general, how would you rate your satisfaction with your social activities and relationships? Fair   In general, please rate how well you carry out your usual social activities and roles Fair   To what extent are you able to carry out your everyday physical activities such as walking, climbing stairs, carrying groceries, or moving a chair? Mostly   In the past 7 days, how often have you been bothered by emotional problems such as feeling anxious, depressed, or irritable? Often   In the past 7 days, how would you rate your fatigue on average? Moderate   In the past 7 days, how would you rate your pain on average, where 0 means no pain, and 10 means worst imaginable pain? 3   In general, would you say your health is: 3   In general, would you say your quality of life is: 4   In general, how would you rate your physical health? 3   In general, how would you rate your mental health, including your mood and your ability to think? 2   In general, how would you rate your satisfaction with your social activities and relationships? 2   In general, please rate how well you carry out your usual social activities and roles. (This includes activities at home, at work and in your community, and responsibilities as a parent, child, spouse, employee, friend, etc.) 2   To what extent are you able to carry out your everyday physical activities such as walking, climbing stairs, carrying groceries, or moving a chair? 4   In the past 7 days, how often have you been bothered by emotional problems such as feeling anxious, depressed, or irritable? 4   In the past 7 days, how would you rate your fatigue on average? 3   In the past 7 days, how would you rate your pain on average,  where 0 means no pain, and 10 means worst imaginable pain? 3   Global Mental Health Score 10   Global Physical Health Score 14   PROMIS TOTAL - SUBSCORES 24     Care Plan review completed: Yes    Medication Review:  No changes to current psychiatric medication(s)    Medication Compliance:  Yes    Changes in Health Issues:  Currently pregnant - 38w4d    Chemical Use Review:   Substance Use: Chemical use reviewed, no active concerns identified      Tobacco Use: No current tobacco use.      Assessment: Current Emotional / Mental Status (status of significant symptoms):  Risk status (Self / Other harm or suicidal ideation)  Patient denies a history of suicidal ideation, suicide attempts, self-injurious behavior, homicidal ideation, homicidal behavior, and and other safety concerns  Patient denies current fears or concerns for personal safety.  Patient denies current or recent suicidal ideation or behaviors.  Patient denies current or recent homicidal ideation or behaviors.  Patient denies current or recent self injurious behavior or ideation.  Patient denies other safety concerns.  A safety and risk management plan has not been developed at this time, however patient was encouraged to call [place] [phone] should there be a change in any of these risk factors.    Appearance:   Unable to assess   Eye Contact:   Unable to assess   Psychomotor Behavior: Unable to assess   Attitude:   Cooperative  Interested Pleasant  Orientation:   All  Speech   Rate / Production: Normal/ Responsive   Volume:  Normal   Mood:    Dysphoric  Affect:    Appropriate   Thought Content:  Clear   Thought Form:  Coherent  Goal Directed  Logical   Insight:    Good     Diagnoses:  1. Adjustment disorder with mixed anxiety and depressed mood      Collateral Reports Completed:  Not Applicable    Plan: (Homework, other):  Follow-up scheduled for postpartum mood/anxiety check. CD Recommendations: No indications of CD issues.     Montserrat Parrish,  LGSW    ______________________________________________________________________    Integrated Primary Care Behavioral Health Treatment Plan    Patient's Name: Zara Kaminski  YOB: 1990    Date of Creation: 8/28/23  Date Treatment Plan Last Reviewed/Revised: n/a    DSM5 Diagnoses: Adjustment Disorders  309.28 (F43.23) With mixed anxiety and depressed mood  Psychosocial / Contextual Factors: patient's cousin was murdered in May 2023, trial begins this week; currently expecting first child due in September   PROMIS (reviewed every 90 days): 24    Referral / Collaboration:  Referral for couples therapy in place    Anticipated number of session for this episode of care: 6  Anticipation frequency of session:  Every 2-4 weeks  Anticipated Duration of each session: 16-37 minutes  Treatment plan will be reviewed in 90 days or when goals have been changed.     MeasurableTreatment Goal(s) related to diagnosis / functional impairment(s)  Goal 1: Patient will reduce distress regarding plans for delivery, evidenced by patient report.     Objective #A (Patient Action)    Patient will identify factors contributing to feelings of depression and anxiety.  Status: New - Date: 8/28/23      Intervention(s)  Therapist will guide reflection on external events and cognitions related to stressors.    Objective #B  Patient will engage in adaptive coping strategies.   Status: New - Date: 8/28/23      Intervention(s)  Therapist will teach cognitive and behavioral skills to manage mental health symptoms.     Goal 2: Patient will process loss in a manner that promotes normative grief reaction.    Objective #A (Patient Action)    Patient will discuss evolution of grief reaction in session, at a pace that feels comfortable for her.   Status: New - Date: 8/28/23     Intervention(s)  Therapist will support patient in reflecting on emotions within grief experience in session.     Patient has reviewed and agreed to the above  plan.      Montserrat Parrish, [facility]  August 28, 2023

## 2023-09-01 ENCOUNTER — PRENATAL OFFICE VISIT (OUTPATIENT)
Dept: OBGYN | Facility: CLINIC | Age: 33
End: 2023-09-01
Payer: COMMERCIAL

## 2023-09-01 VITALS
TEMPERATURE: 97.7 F | SYSTOLIC BLOOD PRESSURE: 116 MMHG | BODY MASS INDEX: 31.48 KG/M2 | DIASTOLIC BLOOD PRESSURE: 72 MMHG | WEIGHT: 201 LBS | HEART RATE: 73 BPM | OXYGEN SATURATION: 99 %

## 2023-09-01 DIAGNOSIS — Z34.03 ENCOUNTER FOR SUPERVISION OF NORMAL FIRST PREGNANCY IN THIRD TRIMESTER: Primary | ICD-10-CM

## 2023-09-01 PROCEDURE — 99207 PR PRENATAL VISIT: CPT | Performed by: OBSTETRICS & GYNECOLOGY

## 2023-09-01 NOTE — PROGRESS NOTES
Patient reports she is feeling about 1-2 contractions per day.  Denies any vaginal bleeding, leaking fluid, changes in vision, chest pain, chest pressure, shortness of breath, or edema.  Having some sinus headaches.  Discussed tylenol, benadryl, allergy medication, and saline nasal spray for symptom relief.  Reviewed s/sx of labor and ROM.  Has pp OTC meds.  Labor orders previously signed and held.    Next visit 9/7/23 with David.  Hermila Lopez MD

## 2023-09-07 ENCOUNTER — PRENATAL OFFICE VISIT (OUTPATIENT)
Dept: OBGYN | Facility: CLINIC | Age: 33
End: 2023-09-07
Payer: COMMERCIAL

## 2023-09-07 VITALS
OXYGEN SATURATION: 97 % | TEMPERATURE: 97.9 F | DIASTOLIC BLOOD PRESSURE: 77 MMHG | HEART RATE: 77 BPM | WEIGHT: 203.4 LBS | SYSTOLIC BLOOD PRESSURE: 121 MMHG | BODY MASS INDEX: 31.86 KG/M2

## 2023-09-07 DIAGNOSIS — Z34.03 ENCOUNTER FOR SUPERVISION OF NORMAL FIRST PREGNANCY IN THIRD TRIMESTER: Primary | ICD-10-CM

## 2023-09-07 PROCEDURE — 99207 PR PRENATAL VISIT: CPT | Performed by: OBSTETRICS & GYNECOLOGY

## 2023-09-07 NOTE — PROGRESS NOTES
40w0d  Doing well, but no signs of labor.  Disappointed nothing has happened yet.  Normal movement.  Cervix 2/40/-3, soft, posterior.  Discussed IOL at any point within the  next week.  Schedule is fairly open except fot 9/14.  They'll talk about it and let me know when they'd like to schedule.  No further prenatal appts scheduled, as plan IOL within 1 w.  Gretchen Hernandez MD

## 2023-09-09 ENCOUNTER — HOSPITAL ENCOUNTER (INPATIENT)
Facility: CLINIC | Age: 33
LOS: 3 days | Discharge: HOME HEALTH | End: 2023-09-12
Attending: OBSTETRICS & GYNECOLOGY | Admitting: OBSTETRICS & GYNECOLOGY
Payer: COMMERCIAL

## 2023-09-09 ENCOUNTER — ANESTHESIA EVENT (OUTPATIENT)
Dept: OBGYN | Facility: CLINIC | Age: 33
End: 2023-09-09
Payer: COMMERCIAL

## 2023-09-09 ENCOUNTER — ANESTHESIA (OUTPATIENT)
Dept: OBGYN | Facility: CLINIC | Age: 33
End: 2023-09-09
Payer: COMMERCIAL

## 2023-09-09 DIAGNOSIS — G89.18 ACUTE POST-OPERATIVE PAIN: ICD-10-CM

## 2023-09-09 DIAGNOSIS — Z98.891 S/P PRIMARY LOW TRANSVERSE C-SECTION: Primary | ICD-10-CM

## 2023-09-09 PROBLEM — Z34.90 PREGNANCY: Status: ACTIVE | Noted: 2023-09-09

## 2023-09-09 LAB
ABO/RH(D): NORMAL
ANTIBODY SCREEN: NEGATIVE
BASOPHILS # BLD AUTO: 0 10E3/UL (ref 0–0.2)
BASOPHILS NFR BLD AUTO: 0 %
EOSINOPHIL # BLD AUTO: 0.1 10E3/UL (ref 0–0.7)
EOSINOPHIL NFR BLD AUTO: 1 %
ERYTHROCYTE [DISTWIDTH] IN BLOOD BY AUTOMATED COUNT: 12.9 % (ref 10–15)
HCT VFR BLD AUTO: 36.4 % (ref 35–47)
HGB BLD-MCNC: 12.5 G/DL (ref 11.7–15.7)
IMM GRANULOCYTES # BLD: 0.1 10E3/UL
IMM GRANULOCYTES NFR BLD: 1 %
LYMPHOCYTES # BLD AUTO: 1.3 10E3/UL (ref 0.8–5.3)
LYMPHOCYTES NFR BLD AUTO: 17 %
MCH RBC QN AUTO: 30.3 PG (ref 26.5–33)
MCHC RBC AUTO-ENTMCNC: 34.3 G/DL (ref 31.5–36.5)
MCV RBC AUTO: 88 FL (ref 78–100)
MONOCYTES # BLD AUTO: 0.5 10E3/UL (ref 0–1.3)
MONOCYTES NFR BLD AUTO: 7 %
NEUTROPHILS # BLD AUTO: 5.7 10E3/UL (ref 1.6–8.3)
NEUTROPHILS NFR BLD AUTO: 74 %
NRBC # BLD AUTO: 0 10E3/UL
NRBC BLD AUTO-RTO: 0 /100
PLATELET # BLD AUTO: 186 10E3/UL (ref 150–450)
RBC # BLD AUTO: 4.13 10E6/UL (ref 3.8–5.2)
SARS-COV-2 RNA RESP QL NAA+PROBE: NEGATIVE
SPECIMEN EXPIRATION DATE: NORMAL
T PALLIDUM AB SER QL: NONREACTIVE
WBC # BLD AUTO: 7.8 10E3/UL (ref 4–11)

## 2023-09-09 PROCEDURE — 250N000011 HC RX IP 250 OP 636: Performed by: STUDENT IN AN ORGANIZED HEALTH CARE EDUCATION/TRAINING PROGRAM

## 2023-09-09 PROCEDURE — 250N000009 HC RX 250: Performed by: OBSTETRICS & GYNECOLOGY

## 2023-09-09 PROCEDURE — 85025 COMPLETE CBC W/AUTO DIFF WBC: CPT | Performed by: OBSTETRICS & GYNECOLOGY

## 2023-09-09 PROCEDURE — 87635 SARS-COV-2 COVID-19 AMP PRB: CPT | Performed by: OBSTETRICS & GYNECOLOGY

## 2023-09-09 PROCEDURE — 3E033VJ INTRODUCTION OF OTHER HORMONE INTO PERIPHERAL VEIN, PERCUTANEOUS APPROACH: ICD-10-PCS | Performed by: OBSTETRICS & GYNECOLOGY

## 2023-09-09 PROCEDURE — C9803 HOPD COVID-19 SPEC COLLECT: HCPCS

## 2023-09-09 PROCEDURE — 10907ZC DRAINAGE OF AMNIOTIC FLUID, THERAPEUTIC FROM PRODUCTS OF CONCEPTION, VIA NATURAL OR ARTIFICIAL OPENING: ICD-10-PCS | Performed by: OBSTETRICS & GYNECOLOGY

## 2023-09-09 PROCEDURE — 86850 RBC ANTIBODY SCREEN: CPT | Performed by: OBSTETRICS & GYNECOLOGY

## 2023-09-09 PROCEDURE — 00HU33Z INSERTION OF INFUSION DEVICE INTO SPINAL CANAL, PERCUTANEOUS APPROACH: ICD-10-PCS | Performed by: STUDENT IN AN ORGANIZED HEALTH CARE EDUCATION/TRAINING PROGRAM

## 2023-09-09 PROCEDURE — 250N000013 HC RX MED GY IP 250 OP 250 PS 637

## 2023-09-09 PROCEDURE — 3E0R3BZ INTRODUCTION OF ANESTHETIC AGENT INTO SPINAL CANAL, PERCUTANEOUS APPROACH: ICD-10-PCS | Performed by: STUDENT IN AN ORGANIZED HEALTH CARE EDUCATION/TRAINING PROGRAM

## 2023-09-09 PROCEDURE — 120N000002 HC R&B MED SURG/OB UMMC

## 2023-09-09 PROCEDURE — 258N000003 HC RX IP 258 OP 636: Performed by: OBSTETRICS & GYNECOLOGY

## 2023-09-09 PROCEDURE — 86780 TREPONEMA PALLIDUM: CPT | Performed by: OBSTETRICS & GYNECOLOGY

## 2023-09-09 PROCEDURE — 250N000011 HC RX IP 250 OP 636: Mod: JZ | Performed by: STUDENT IN AN ORGANIZED HEALTH CARE EDUCATION/TRAINING PROGRAM

## 2023-09-09 PROCEDURE — 250N000011 HC RX IP 250 OP 636: Mod: JZ | Performed by: OBSTETRICS & GYNECOLOGY

## 2023-09-09 PROCEDURE — 86901 BLOOD TYPING SEROLOGIC RH(D): CPT | Performed by: OBSTETRICS & GYNECOLOGY

## 2023-09-09 PROCEDURE — 250N000011 HC RX IP 250 OP 636

## 2023-09-09 RX ORDER — ACETAMINOPHEN 325 MG/1
650 TABLET ORAL EVERY 4 HOURS PRN
Status: DISCONTINUED | OUTPATIENT
Start: 2023-09-09 | End: 2023-09-10 | Stop reason: HOSPADM

## 2023-09-09 RX ORDER — NALOXONE HYDROCHLORIDE 0.4 MG/ML
0.2 INJECTION, SOLUTION INTRAMUSCULAR; INTRAVENOUS; SUBCUTANEOUS
Status: DISCONTINUED | OUTPATIENT
Start: 2023-09-09 | End: 2023-09-10 | Stop reason: HOSPADM

## 2023-09-09 RX ORDER — PROCHLORPERAZINE 25 MG
25 SUPPOSITORY, RECTAL RECTAL EVERY 12 HOURS PRN
Status: DISCONTINUED | OUTPATIENT
Start: 2023-09-09 | End: 2023-09-10 | Stop reason: HOSPADM

## 2023-09-09 RX ORDER — OXYTOCIN/0.9 % SODIUM CHLORIDE 30/500 ML
1-24 PLASTIC BAG, INJECTION (ML) INTRAVENOUS CONTINUOUS
Status: DISCONTINUED | OUTPATIENT
Start: 2023-09-09 | End: 2023-09-10 | Stop reason: HOSPADM

## 2023-09-09 RX ORDER — METOCLOPRAMIDE 10 MG/1
10 TABLET ORAL EVERY 6 HOURS PRN
Status: DISCONTINUED | OUTPATIENT
Start: 2023-09-09 | End: 2023-09-10 | Stop reason: HOSPADM

## 2023-09-09 RX ORDER — NALOXONE HYDROCHLORIDE 0.4 MG/ML
0.4 INJECTION, SOLUTION INTRAMUSCULAR; INTRAVENOUS; SUBCUTANEOUS
Status: DISCONTINUED | OUTPATIENT
Start: 2023-09-09 | End: 2023-09-10 | Stop reason: HOSPADM

## 2023-09-09 RX ORDER — PROCHLORPERAZINE MALEATE 10 MG
10 TABLET ORAL EVERY 6 HOURS PRN
Status: DISCONTINUED | OUTPATIENT
Start: 2023-09-09 | End: 2023-09-10 | Stop reason: HOSPADM

## 2023-09-09 RX ORDER — FENTANYL CITRATE-0.9 % NACL/PF 10 MCG/ML
100 PLASTIC BAG, INJECTION (ML) INTRAVENOUS EVERY 5 MIN PRN
Status: DISCONTINUED | OUTPATIENT
Start: 2023-09-09 | End: 2023-09-10 | Stop reason: HOSPADM

## 2023-09-09 RX ORDER — METOCLOPRAMIDE HYDROCHLORIDE 5 MG/ML
10 INJECTION INTRAMUSCULAR; INTRAVENOUS EVERY 6 HOURS PRN
Status: DISCONTINUED | OUTPATIENT
Start: 2023-09-09 | End: 2023-09-10 | Stop reason: HOSPADM

## 2023-09-09 RX ORDER — NALBUPHINE HYDROCHLORIDE 20 MG/ML
2.5-5 INJECTION, SOLUTION INTRAMUSCULAR; INTRAVENOUS; SUBCUTANEOUS EVERY 6 HOURS PRN
Status: DISCONTINUED | OUTPATIENT
Start: 2023-09-09 | End: 2023-09-10

## 2023-09-09 RX ORDER — LIDOCAINE 40 MG/G
CREAM TOPICAL
Status: DISCONTINUED | OUTPATIENT
Start: 2023-09-09 | End: 2023-09-10 | Stop reason: HOSPADM

## 2023-09-09 RX ORDER — SODIUM CHLORIDE, SODIUM LACTATE, POTASSIUM CHLORIDE, CALCIUM CHLORIDE 600; 310; 30; 20 MG/100ML; MG/100ML; MG/100ML; MG/100ML
INJECTION, SOLUTION INTRAVENOUS CONTINUOUS
Status: DISCONTINUED | OUTPATIENT
Start: 2023-09-09 | End: 2023-09-10 | Stop reason: HOSPADM

## 2023-09-09 RX ORDER — FENTANYL/ROPIVACAINE/NS/PF 2MCG/ML-.1
PLASTIC BAG, INJECTION (ML) EPIDURAL
Status: COMPLETED
Start: 2023-09-09 | End: 2023-09-09

## 2023-09-09 RX ORDER — OXYTOCIN 10 [USP'U]/ML
10 INJECTION, SOLUTION INTRAMUSCULAR; INTRAVENOUS
Status: COMPLETED | OUTPATIENT
Start: 2023-09-09 | End: 2023-09-09

## 2023-09-09 RX ORDER — TERBUTALINE SULFATE 1 MG/ML
0.25 INJECTION, SOLUTION SUBCUTANEOUS
Status: DISCONTINUED | OUTPATIENT
Start: 2023-09-09 | End: 2023-09-10 | Stop reason: HOSPADM

## 2023-09-09 RX ORDER — CARBOPROST TROMETHAMINE 250 UG/ML
250 INJECTION, SOLUTION INTRAMUSCULAR
Status: DISCONTINUED | OUTPATIENT
Start: 2023-09-09 | End: 2023-09-10 | Stop reason: HOSPADM

## 2023-09-09 RX ORDER — ESCITALOPRAM OXALATE 5 MG/1
5 TABLET ORAL DAILY
Status: DISCONTINUED | OUTPATIENT
Start: 2023-09-09 | End: 2023-09-12 | Stop reason: HOSPADM

## 2023-09-09 RX ORDER — FENTANYL/ROPIVACAINE/NS/PF 2MCG/ML-.1
PLASTIC BAG, INJECTION (ML) EPIDURAL
Status: DISCONTINUED | OUTPATIENT
Start: 2023-09-09 | End: 2023-09-10 | Stop reason: HOSPADM

## 2023-09-09 RX ORDER — ONDANSETRON 4 MG/1
4 TABLET, ORALLY DISINTEGRATING ORAL EVERY 6 HOURS PRN
Status: DISCONTINUED | OUTPATIENT
Start: 2023-09-09 | End: 2023-09-10 | Stop reason: HOSPADM

## 2023-09-09 RX ORDER — FENTANYL CITRATE-0.9 % NACL/PF 10 MCG/ML
PLASTIC BAG, INJECTION (ML) INTRAVENOUS
Status: COMPLETED
Start: 2023-09-09 | End: 2023-09-09

## 2023-09-09 RX ORDER — MISOPROSTOL 200 UG/1
800 TABLET ORAL
Status: DISCONTINUED | OUTPATIENT
Start: 2023-09-09 | End: 2023-09-10 | Stop reason: HOSPADM

## 2023-09-09 RX ORDER — ONDANSETRON 2 MG/ML
4 INJECTION INTRAMUSCULAR; INTRAVENOUS EVERY 6 HOURS PRN
Status: DISCONTINUED | OUTPATIENT
Start: 2023-09-09 | End: 2023-09-10 | Stop reason: HOSPADM

## 2023-09-09 RX ORDER — SODIUM CHLORIDE, SODIUM LACTATE, POTASSIUM CHLORIDE, CALCIUM CHLORIDE 600; 310; 30; 20 MG/100ML; MG/100ML; MG/100ML; MG/100ML
INJECTION, SOLUTION INTRAVENOUS CONTINUOUS PRN
Status: DISCONTINUED | OUTPATIENT
Start: 2023-09-09 | End: 2023-09-10 | Stop reason: HOSPADM

## 2023-09-09 RX ORDER — OXYTOCIN/0.9 % SODIUM CHLORIDE 30/500 ML
340 PLASTIC BAG, INJECTION (ML) INTRAVENOUS CONTINUOUS PRN
Status: DISCONTINUED | OUTPATIENT
Start: 2023-09-09 | End: 2023-09-10 | Stop reason: HOSPADM

## 2023-09-09 RX ORDER — LIDOCAINE HYDROCHLORIDE 10 MG/ML
INJECTION, SOLUTION EPIDURAL; INFILTRATION; INTRACAUDAL; PERINEURAL
Status: DISCONTINUED
Start: 2023-09-09 | End: 2023-09-09 | Stop reason: HOSPADM

## 2023-09-09 RX ORDER — METHYLERGONOVINE MALEATE 0.2 MG/ML
200 INJECTION INTRAVENOUS
Status: DISCONTINUED | OUTPATIENT
Start: 2023-09-09 | End: 2023-09-10 | Stop reason: HOSPADM

## 2023-09-09 RX ORDER — MISOPROSTOL 200 UG/1
400 TABLET ORAL
Status: DISCONTINUED | OUTPATIENT
Start: 2023-09-09 | End: 2023-09-10 | Stop reason: HOSPADM

## 2023-09-09 RX ORDER — TRANEXAMIC ACID 10 MG/ML
1 INJECTION, SOLUTION INTRAVENOUS EVERY 30 MIN PRN
Status: DISCONTINUED | OUTPATIENT
Start: 2023-09-09 | End: 2023-09-10 | Stop reason: HOSPADM

## 2023-09-09 RX ORDER — CITRIC ACID/SODIUM CITRATE 334-500MG
30 SOLUTION, ORAL ORAL ONCE
Status: COMPLETED | OUTPATIENT
Start: 2023-09-09 | End: 2023-09-09

## 2023-09-09 RX ORDER — CITRIC ACID/SODIUM CITRATE 334-500MG
30 SOLUTION, ORAL ORAL
Status: DISCONTINUED | OUTPATIENT
Start: 2023-09-09 | End: 2023-09-10 | Stop reason: HOSPADM

## 2023-09-09 RX ADMIN — BUPIVACAINE HYDROCHLORIDE IN DEXTROSE 1.4 ML: 7.5 INJECTION, SOLUTION SUBARACHNOID at 05:05

## 2023-09-09 RX ADMIN — ESCITALOPRAM OXALATE 5 MG: 5 TABLET, FILM COATED ORAL at 08:56

## 2023-09-09 RX ADMIN — Medication: at 16:46

## 2023-09-09 RX ADMIN — METOCLOPRAMIDE HYDROCHLORIDE 10 MG: 5 INJECTION INTRAMUSCULAR; INTRAVENOUS at 21:17

## 2023-09-09 RX ADMIN — SODIUM CHLORIDE, POTASSIUM CHLORIDE, SODIUM LACTATE AND CALCIUM CHLORIDE: 600; 310; 30; 20 INJECTION, SOLUTION INTRAVENOUS at 09:37

## 2023-09-09 RX ADMIN — Medication 2 MILLI-UNITS/MIN: at 09:40

## 2023-09-09 RX ADMIN — SODIUM CHLORIDE, POTASSIUM CHLORIDE, SODIUM LACTATE AND CALCIUM CHLORIDE: 600; 310; 30; 20 INJECTION, SOLUTION INTRAVENOUS at 16:46

## 2023-09-09 RX ADMIN — Medication 100 MCG: at 18:42

## 2023-09-09 RX ADMIN — Medication 14 MILLI-UNITS/MIN: at 21:17

## 2023-09-09 RX ADMIN — Medication 10 MILLI-UNITS/MIN: at 23:01

## 2023-09-09 RX ADMIN — ONDANSETRON 4 MG: 2 INJECTION INTRAMUSCULAR; INTRAVENOUS at 16:23

## 2023-09-09 RX ADMIN — SODIUM CHLORIDE, POTASSIUM CHLORIDE, SODIUM LACTATE AND CALCIUM CHLORIDE: 600; 310; 30; 20 INJECTION, SOLUTION INTRAVENOUS at 19:34

## 2023-09-09 RX ADMIN — SODIUM CHLORIDE, POTASSIUM CHLORIDE, SODIUM LACTATE AND CALCIUM CHLORIDE 1000 ML: 600; 310; 30; 20 INJECTION, SOLUTION INTRAVENOUS at 15:47

## 2023-09-09 ASSESSMENT — ACTIVITIES OF DAILY LIVING (ADL)
ADLS_ACUITY_SCORE: 24
ADLS_ACUITY_SCORE: 20
ADLS_ACUITY_SCORE: 24
ADLS_ACUITY_SCORE: 20

## 2023-09-09 NOTE — H&P
[facility]    History and Physical  Obstetrics and Gynecology     Date of Admission:  2023    Assessment & Plan   Zara Kaminski is a 33 year old female who presents for late term IOL  ASSESSMENT:   IUP @ 40w2d for induction of labor.  Indication elective (late term, suspected macrosomia)  NST reactive.  Category  I    PLAN:   Induction of labor:    Cervical exam right at the cusp of considering cervical ripening vs pitocin.  However, she is nely fairly regularly on the monitor.  Could consider balloon placement, but given her cervical exam, don't suspect it would be in place for long or result in significant change in cervical exam, so will start pitocin.    Fetal well being:  Cat 1 tracing  Suspected macrosomia.  Discussed risks of shoulder dystocia, including risk of nerve injury, fracture, neurologic injury, fetal death.  Declines .  Will monitor progress closely in labor.  If not progressing appropriately on labor curve, would move toward .  Would not consider operative delivery.    Pain:    would like epidural at some point.    Gretchen Hernandez MD    ---------------      History of Present Illness   Zara Kaminski is a 33 year old female  40w2d  Estimated Date of Delivery: 23 is calculated from Patient's last menstrual period was 2022. [facility]  for [facility] of labor.  Indication elective (late term, suspected macrosomia).    Doing well today; no complaints.  Denies feeling significant cramping or contractions.  No leaking or bleeding. Normal FM.    PRENATAL COURSE  Prenatal course was complicated by   -Low lying placenta--resolved  -Suspected macrosomia  -Hx of ADHD--not on meds currently.    Recent Labs   Lab Test 23  1418   AS Negative     Rhogam not indicated   Recent Labs   Lab Test 23  1418 23  1417   HEPBANG  --  Nonreactive   HIAGAB  --  Nonreactive   RUQIGG Positive  --         Past Medical History    I have reviewed this patient's medical history and updated it with pertinent information if needed.   Past Medical History:   Diagnosis Date    ADHD (attention deficit hyperactivity disorder)     Trauma and stressor-related disorder 2016       Past Surgical History   I have reviewed this patient's surgical history and updated it with pertinent information if needed.  Past Surgical History:   Procedure Laterality Date    WISDOM TOOTH EXTRACTION         Prior to Admission Medications   Prior to Admission Medications   Prescriptions Last Dose Informant Patient Reported? Taking?   Misc. Devices (BREAST PUMP) MISC   No No   Si each continuous prn (breast feeding)   Patient not taking: Reported on 2023   Prenatal Vit-Fe Fumarate-FA (PRENATAL MULTIVITAMIN W/IRON) 27-0.8 MG tablet 2023 at 0900  Yes Yes   Sig: Take 1 tablet by mouth daily   acetaminophen (TYLENOL) 325 MG tablet   No No   Sig: Take 2 tablets (650 mg) by mouth every 6 hours as needed for mild pain Start after Delivery.   Patient not taking: Reported on 2023   amphetamine-dextroamphetamine (ADDERALL XR) 20 MG 24 hr capsule   Yes No   Sig: Take 20 mg by mouth daily   Patient not taking: Reported on 2023   amphetamine-dextroamphetamine (ADDERALL) 10 MG tablet   Yes No   Patient not taking: Reported on 2023   escitalopram (LEXAPRO) 5 MG tablet 2023 at 0900  No Yes   Sig: Take 1 tablet (5 mg) by mouth daily for 90 days   folic acid-vit B6-vit B12 (FOLGARD) 0.8-10-0.115 MG TABS per tablet 2023 at 0900  Yes Yes   Sig: Take by mouth daily   ibuprofen (ADVIL/MOTRIN) 600 MG tablet   No No   Sig: Take 1 tablet (600 mg) by mouth every 6 hours as needed for moderate pain Start after delivery   Patient not taking: Reported on 2023   senna-docusate (SENOKOT-S/PERICOLACE) 8.6-50 MG tablet   No No   Sig: Take 1 tablet by mouth daily Start after delivery.   Patient not taking: Reported on 2023       Facility-Administered Medications: None     Allergies   No Known Allergies    Social History   I have reviewed this patient's social history and updated it with pertinent information if needed. Zara Kaminski  reports that she has never smoked. She has never used smokeless tobacco. She reports that she does not currently use drugs.    Family History   I have reviewed this patient's family history and updated it with pertinent information if needed.   Family History   Problem Relation Age of Onset    Goiter Father     Skin Cancer Father     Breast Cancer Maternal Aunt 30    Birth defects No family hx of     Genetic Disease No family hx of        Immunization History   Immunization History   Administered Date(s) Administered    COVID-19 Bivalent 18+ (Moderna) 11/09/2022    COVID-19 MONOVALENT 12+ (Pfizer) 03/13/2021, 03/31/2021, 12/02/2021    HEPATITIS A (PEDS 12M-18Y) 02/27/2002    Hepatitis A (ADULT 19+) 01/09/2015    Hepatitis B (Peds <19Y) 03/09/2000, 04/10/2000, 03/21/2001    Historic Hib Hib-titer 09/01/1989    Historical DTP/aP 1988, 1988, 1988, 02/28/1990, 02/23/1993    Influenza (IIV3) PF 12/16/2005, 09/04/2009    Influenza Vaccine >6 months (Alfuria,Fluzone) 12/03/2013    Influenza Vaccine Im 4yrs+ 4 Valent CCIIV4 02/25/2022    Influenza Vaccine, 6+MO IM (QUADRIVALENT W/PRESERVATIVES) 12/03/2013    Influenza,INJ,MDCK,PF,Quad >6mo(Flucelvax) 09/17/2020, 11/09/2022    MMR 05/24/1989, 04/10/2000    Mantoux Tuberculin Skin Test 06/19/2006    Meningococcal ACWY (Menactra ) 06/19/2006    OPV, trivalent, live 1988, 1988, 1988, 02/28/1990, 02/23/1993    Pneumococcal 23 valent 12/03/2013    TDAP (Adacel,Boostrix) 06/19/2006, 01/09/2015, 06/30/2023    Td (Adult), Adsorbed 03/09/2000       Physical Exam   Temp: 98.3  F (36.8  C) Temp src: Oral BP: 115/71     Resp: 18        Vital Signs with Ranges  Temp:  [98.3  F (36.8  C)] 98.3  F (36.8  C)  Resp:  [18] 18  BP: (115)/(71)  115/71    Abdomen: gravid, single vertex fetus, non-tender, EFW 9 lbs 8  Cervical Exam: 2.5/ 50/ Posterior/ soft/ -3     Fetal Heart Tones: 140 baseline, moderate variablility, + accels, no decels, and Category I  TOCO:   frequency q 2-6 minutes.  Irregular, with irritability between ctx    Constitutional: healthy, alert, active, and no distress   Respiratory: no increased work of breathing  Cardiovascular: normal apical pulses   Skin/Extremites: no bruising or bleeding, normal skin color, texture, turgor, and no redness, warmth, or swelling  Neurologic: Awake, alert, oriented to name, place and time.  Cranial nerves II-XII are grossly intact.    Neuropsychiatric: Affect: normal

## 2023-09-09 NOTE — PROVIDER NOTIFICATION
09/09/23 1845   Provider Notification   Provider Name/Title Dr Mccartney   Method of Notification Electronic Page   Notification Reason Decels;Maternal Vital Sign Change     Late decel x2 after repositioning to high fowlers. Pt was light-headed and pale, hypotensive even after repositioing, received phenylephrine. B/P improved and so did heart tones.

## 2023-09-09 NOTE — PROVIDER NOTIFICATION
09/09/23 0901   Provider Notification   Provider Name/Title Dr Hernandez   Method of Notification At Bedside     Dr Hernandez at bedside evaluating Zara for IOL. BSUS confirms cephalic position. SVE 2.5/50/-3. Oliveira score of 5. Uterine contractions q1-6 minutes apart- mild on palpation, Zara is not feeling contractions. FHTs reactive. Based on frequency of uterine activity plan for IOL with pitocin. Zara and  Ej in agreement with plan.

## 2023-09-09 NOTE — ANESTHESIA PREPROCEDURE EVALUATION
Anesthesia Pre-Procedure Evaluation    Patient: Zara Kaminski   MRN: [id number] : 1990        Procedure :           Past Medical History:   Diagnosis Date    ADHD (attention deficit hyperactivity disorder)     Trauma and stressor-related disorder 2016      Past Surgical History:   Procedure Laterality Date    WISDOM TOOTH EXTRACTION        No Known Allergies   Social History     Tobacco Use    Smoking status: Never    Smokeless tobacco: Never   Substance Use Topics    Alcohol use: Not on file      Wt Readings from Last 1 Encounters:   23 91.6 kg (202 lb)        Anesthesia Evaluation   Pt has not had prior anesthetic         ROS/MED HX  ENT/Pulmonary:  - neg pulmonary ROS     Neurologic:  - neg neurologic ROS     Cardiovascular:  - neg cardiovascular ROS     METS/Exercise Tolerance: >4 METS    Hematologic:  - neg hematologic  ROS     Musculoskeletal:  - neg musculoskeletal ROS     GI/Hepatic:  - neg GI/hepatic ROS     Renal/Genitourinary:  - neg Renal ROS     Endo:  - neg endo ROS     Psychiatric/Substance Use:  - neg psychiatric ROS   (+) psychiatric history        Infectious Disease:  - neg infectious disease ROS     Malignancy:  - neg malignancy ROS     Other:      (+) Possibly pregnant, , ,         Physical Exam    Airway        Mallampati: IV   TM distance: > 3 FB   Neck ROM: full   Mouth opening: > 3 cm    Respiratory Devices and Support         Dental       (+) Completely normal teeth      Cardiovascular   cardiovascular exam normal          Pulmonary   pulmonary exam normal                OUTSIDE LABS:  CBC:   Lab Results   Component Value Date    WBC 7.8 2023    WBC 9.1 2023    HGB 12.5 2023    HGB 11.8 2023    HCT 36.4 2023    HCT 37.3 2023     2023     2023     BMP:   Lab Results   Component Value Date     04/15/2010    POTASSIUM 3.6 04/15/2010    CHLORIDE 105 04/15/2010    CO2 27 04/15/2010    BUN 8 04/15/2010    CR  0.80 04/15/2010    GLC 86 04/15/2010     COAGS: No results found for: PTT, INR, FIBR  POC: No results found for: BGM, HCG, HCGS  HEPATIC: No results found for: ALBUMIN, PROTTOTAL, ALT, AST, GGT, ALKPHOS, BILITOTAL, BILIDIRECT, JAMEL  OTHER:   Lab Results   Component Value Date    ZAKI 9.2 04/15/2010       Anesthesia Plan    ASA Status:  2    NPO Status:  ELEVATED Aspiration Risk/Unknown    Anesthesia Type: Epidural.              Consents    Anesthesia Plan(s) and associated risks, benefits, and realistic alternatives discussed. Questions answered and patient/representative(s) expressed understanding.     - Discussed:     - Discussed with:  Patient      - Extended Intubation/Ventilatory Support Discussed: No.      - Patient is DNR/DNI Status: No     Use of blood products discussed: No .     Postoperative Care            Comments:                Alayna Martin MD

## 2023-09-09 NOTE — PROVIDER NOTIFICATION
09/09/23 5375   Provider Notification   Provider Name/Title DR Hernandez   Method of Notification At Bedside   Notification Reason SVE     Dr Hernandez at bedside to evaluate labor progress. Oxytocin currently at 10 niko/units. Uterine contractions q 2-3 minutes apart. FHTs reactive. SVE 3/50/-3. Plan to AROM and epidural when requested by Zara.

## 2023-09-09 NOTE — PROGRESS NOTES
"   [facility] PROGRESS NOTE:   2023 2:08 PM         SUBJECTIVE:   Patient reports she's fairly comfortable.  Aware of contractions, but they're not painful           OBJECTIVE:     Vitals:    23 0800 23 0940 23 1116 23 1330   BP: 115/71 122/75 124/65 113/64   BP Location: Left arm      Patient Position: Semi-Soria's      Cuff Size: Adult Regular      Resp: 18 18     Temp: 98.3  F (36.8  C)  97.9  F (36.6  C)    TempSrc: Oral  Oral    Weight: 91.6 kg (202 lb)      Height: 1.702 m (5' 7\")            NST:  reactive  FHT:  130/mod/+ accels, no decels  Cat:   1  Mancos:  q 2 min  SVE:  3/50/-3, s/p AROM clear fluid             ASSESSMENT / PLAN:   33 year old  at 40w2d admitted for IOL.    Complications:  Suspected LGA.  Declines .  Reviewed risk of SD.    Labor: cont pitocin.  S/p AROM  Fetal well being: Category 1 tracing.  GBS: neg  Pain: epidural at patient request    Gretchen Hernandez MD        "

## 2023-09-09 NOTE — ANESTHESIA PROCEDURE NOTES
"Epidural catheter Procedure Note    Pre-Procedure   Staff -        Anesthesiologist:  Alayna Martin MD       Performed By: anesthesiologist       Location: OB       Pre-Anesthestic Checklist: patient identified, IV checked, risks and benefits discussed, informed consent, monitors and equipment checked, pre-op evaluation, at physician/surgeon's request and post-op pain management  Timeout:       Correct Patient: Yes        Correct Procedure: Yes        Correct Site: Yes        Correct Position: Yes   Procedure Documentation  Procedure: epidural catheter       Patient Position: sitting       Patient Prep/Sterile Barriers: sterile gloves, mask, patient draped       Skin prep: Chloraprep       Local skin infiltrated with mL of 2% lidocaine.        Insertion Site: L4-5. (midline approach).       Technique: LORT saline        SANKET at 6.5 cm.       Needle Type: Hydrocapsuley needle       Needle Gauge: 17.        Needle Length (Inches): 3.5        Catheter: 19 G.          Catheter threaded easily.         4.5 cm epidural space.         Threaded 11 cm at skin.         # of attempts: 1 and  # of redirects:  0    Assessment/Narrative         Paresthesias: No.       Test dose of 3 mL lidocaine 1.5% w/ 1:200,000 epinephrine at 16:15 CDT.         Test dose negative, 3 minutes after injection, for signs of intravascular, subdural, or intrathecal injection.       Insertion/Infusion Method: LORT saline       Aspiration negative for Heme or CSF via Epidural Catheter.       Sensory Level Left: T10.       Sensory Level Right: T10.     Comments:  Bolus of 8cc given from epidural pump      FOR [facility] ([facility]) ONLY:   Pain Team Contact information: please page the Pain Team Via [employer]. Search \"Pain\". During daytime hours, please page the attending first. At night please page the resident first.      "

## 2023-09-09 NOTE — PLAN OF CARE
Goal Outcome Evaluation:      Plan of Care Reviewed With: patient, spouse    Overall Patient Progress: improvingOverall Patient Progress: improving     Zara reports contractions more intense after AROM. Epidural requested and received. Coping well with labor and denies pain after epidural. VSS. Continues to leak clear fluid. Uterine contractions overall every 2-3 minutes apart with oxytocin at 10 niko/units. FHTs category 1. Plan to continue with oxytocin titration as needed. Assisting with position changes to facilitate labor progression. Anticipate .

## 2023-09-10 PROCEDURE — 258N000003 HC RX IP 258 OP 636: Performed by: STUDENT IN AN ORGANIZED HEALTH CARE EDUCATION/TRAINING PROGRAM

## 2023-09-10 PROCEDURE — 250N000011 HC RX IP 250 OP 636

## 2023-09-10 PROCEDURE — 250N000009 HC RX 250: Performed by: STUDENT IN AN ORGANIZED HEALTH CARE EDUCATION/TRAINING PROGRAM

## 2023-09-10 PROCEDURE — 120N000002 HC R&B MED SURG/OB UMMC

## 2023-09-10 PROCEDURE — 370N000017 HC ANESTHESIA TECHNICAL FEE, PER MIN: Performed by: OBSTETRICS & GYNECOLOGY

## 2023-09-10 PROCEDURE — 710N000010 HC RECOVERY PHASE 1, LEVEL 2, PER MIN: Performed by: OBSTETRICS & GYNECOLOGY

## 2023-09-10 PROCEDURE — 258N000003 HC RX IP 258 OP 636: Performed by: OBSTETRICS & GYNECOLOGY

## 2023-09-10 PROCEDURE — 250N000011 HC RX IP 250 OP 636: Mod: JZ

## 2023-09-10 PROCEDURE — C1765 ADHESION BARRIER: HCPCS | Performed by: OBSTETRICS & GYNECOLOGY

## 2023-09-10 PROCEDURE — 250N000011 HC RX IP 250 OP 636: Mod: JZ | Performed by: NURSE ANESTHETIST, CERTIFIED REGISTERED

## 2023-09-10 PROCEDURE — 258N000003 HC RX IP 258 OP 636: Performed by: NURSE ANESTHETIST, CERTIFIED REGISTERED

## 2023-09-10 PROCEDURE — 250N000011 HC RX IP 250 OP 636: Performed by: STUDENT IN AN ORGANIZED HEALTH CARE EDUCATION/TRAINING PROGRAM

## 2023-09-10 PROCEDURE — 272N000001 HC OR GENERAL SUPPLY STERILE: Performed by: OBSTETRICS & GYNECOLOGY

## 2023-09-10 PROCEDURE — 250N000009 HC RX 250: Performed by: OBSTETRICS & GYNECOLOGY

## 2023-09-10 PROCEDURE — 250N000013 HC RX MED GY IP 250 OP 250 PS 637

## 2023-09-10 PROCEDURE — 0W3F0ZZ CONTROL BLEEDING IN ABDOMINAL WALL, OPEN APPROACH: ICD-10-PCS | Performed by: OBSTETRICS & GYNECOLOGY

## 2023-09-10 PROCEDURE — C9290 INJ, BUPIVACAINE LIPOSOME: HCPCS | Performed by: STUDENT IN AN ORGANIZED HEALTH CARE EDUCATION/TRAINING PROGRAM

## 2023-09-10 PROCEDURE — 59510 CESAREAN DELIVERY: CPT | Mod: GC | Performed by: OBSTETRICS & GYNECOLOGY

## 2023-09-10 PROCEDURE — 360N000076 HC SURGERY LEVEL 3, PER MIN: Performed by: OBSTETRICS & GYNECOLOGY

## 2023-09-10 PROCEDURE — 271N000001 HC OR GENERAL SUPPLY NON-STERILE: Performed by: OBSTETRICS & GYNECOLOGY

## 2023-09-10 PROCEDURE — 250N000009 HC RX 250: Performed by: NURSE ANESTHETIST, CERTIFIED REGISTERED

## 2023-09-10 PROCEDURE — 250N000011 HC RX IP 250 OP 636: Mod: JZ | Performed by: OBSTETRICS & GYNECOLOGY

## 2023-09-10 RX ORDER — FENTANYL CITRATE 50 UG/ML
INJECTION, SOLUTION INTRAMUSCULAR; INTRAVENOUS PRN
Status: DISCONTINUED | OUTPATIENT
Start: 2023-09-10 | End: 2023-09-10

## 2023-09-10 RX ORDER — BISACODYL 10 MG
10 SUPPOSITORY, RECTAL RECTAL DAILY PRN
Status: DISCONTINUED | OUTPATIENT
Start: 2023-09-12 | End: 2023-09-12 | Stop reason: HOSPADM

## 2023-09-10 RX ORDER — KETOROLAC TROMETHAMINE 30 MG/ML
30 INJECTION, SOLUTION INTRAMUSCULAR; INTRAVENOUS
Status: DISCONTINUED | OUTPATIENT
Start: 2023-09-10 | End: 2023-09-12 | Stop reason: HOSPADM

## 2023-09-10 RX ORDER — MISOPROSTOL 200 UG/1
400 TABLET ORAL
Status: DISCONTINUED | OUTPATIENT
Start: 2023-09-10 | End: 2023-09-12 | Stop reason: HOSPADM

## 2023-09-10 RX ORDER — OXYTOCIN/0.9 % SODIUM CHLORIDE 30/500 ML
PLASTIC BAG, INJECTION (ML) INTRAVENOUS CONTINUOUS PRN
Status: DISCONTINUED | OUTPATIENT
Start: 2023-09-10 | End: 2023-09-10

## 2023-09-10 RX ORDER — MISOPROSTOL 200 UG/1
400 TABLET ORAL
Status: DISCONTINUED | OUTPATIENT
Start: 2023-09-10 | End: 2023-09-10 | Stop reason: HOSPADM

## 2023-09-10 RX ORDER — NALOXONE HYDROCHLORIDE 0.4 MG/ML
0.4 INJECTION, SOLUTION INTRAMUSCULAR; INTRAVENOUS; SUBCUTANEOUS
Status: DISCONTINUED | OUTPATIENT
Start: 2023-09-10 | End: 2023-09-12 | Stop reason: HOSPADM

## 2023-09-10 RX ORDER — TRANEXAMIC ACID 10 MG/ML
1 INJECTION, SOLUTION INTRAVENOUS EVERY 30 MIN PRN
Status: DISCONTINUED | OUTPATIENT
Start: 2023-09-10 | End: 2023-09-10 | Stop reason: HOSPADM

## 2023-09-10 RX ORDER — OXYTOCIN 10 [USP'U]/ML
10 INJECTION, SOLUTION INTRAMUSCULAR; INTRAVENOUS
Status: DISCONTINUED | OUTPATIENT
Start: 2023-09-10 | End: 2023-09-12 | Stop reason: HOSPADM

## 2023-09-10 RX ORDER — KETAMINE HYDROCHLORIDE 10 MG/ML
INJECTION INTRAMUSCULAR; INTRAVENOUS PRN
Status: DISCONTINUED | OUTPATIENT
Start: 2023-09-10 | End: 2023-09-10

## 2023-09-10 RX ORDER — MORPHINE SULFATE 1 MG/ML
INJECTION, SOLUTION EPIDURAL; INTRATHECAL; INTRAVENOUS
Status: COMPLETED | OUTPATIENT
Start: 2023-09-09 | End: 2023-09-10

## 2023-09-10 RX ORDER — MODIFIED LANOLIN
OINTMENT (GRAM) TOPICAL
Status: DISCONTINUED | OUTPATIENT
Start: 2023-09-10 | End: 2023-09-12 | Stop reason: HOSPADM

## 2023-09-10 RX ORDER — ONDANSETRON 2 MG/ML
4 INJECTION INTRAMUSCULAR; INTRAVENOUS EVERY 6 HOURS PRN
Status: DISCONTINUED | OUTPATIENT
Start: 2023-09-10 | End: 2023-09-12 | Stop reason: HOSPADM

## 2023-09-10 RX ORDER — METHYLERGONOVINE MALEATE 0.2 MG/ML
200 INJECTION INTRAVENOUS
Status: DISCONTINUED | OUTPATIENT
Start: 2023-09-10 | End: 2023-09-10 | Stop reason: HOSPADM

## 2023-09-10 RX ORDER — DIPHENHYDRAMINE HCL 25 MG
25 CAPSULE ORAL EVERY 6 HOURS PRN
Status: DISCONTINUED | OUTPATIENT
Start: 2023-09-10 | End: 2023-09-12 | Stop reason: HOSPADM

## 2023-09-10 RX ORDER — CEFAZOLIN SODIUM/WATER 2 G/20 ML
2 SYRINGE (ML) INTRAVENOUS SEE ADMIN INSTRUCTIONS
Status: DISCONTINUED | OUTPATIENT
Start: 2023-09-10 | End: 2023-09-10 | Stop reason: HOSPADM

## 2023-09-10 RX ORDER — MISOPROSTOL 200 UG/1
800 TABLET ORAL
Status: DISCONTINUED | OUTPATIENT
Start: 2023-09-10 | End: 2023-09-12 | Stop reason: HOSPADM

## 2023-09-10 RX ORDER — AZITHROMYCIN 500 MG/5ML
500 INJECTION, POWDER, LYOPHILIZED, FOR SOLUTION INTRAVENOUS
Status: COMPLETED | OUTPATIENT
Start: 2023-09-10 | End: 2023-09-10

## 2023-09-10 RX ORDER — BUPIVACAINE HYDROCHLORIDE 7.5 MG/ML
INJECTION, SOLUTION INTRASPINAL
Status: COMPLETED | OUTPATIENT
Start: 2023-09-09 | End: 2023-09-10

## 2023-09-10 RX ORDER — LIDOCAINE 40 MG/G
CREAM TOPICAL
Status: DISCONTINUED | OUTPATIENT
Start: 2023-09-10 | End: 2023-09-12 | Stop reason: HOSPADM

## 2023-09-10 RX ORDER — DEXTROSE, SODIUM CHLORIDE, SODIUM LACTATE, POTASSIUM CHLORIDE, AND CALCIUM CHLORIDE 5; .6; .31; .03; .02 G/100ML; G/100ML; G/100ML; G/100ML; G/100ML
INJECTION, SOLUTION INTRAVENOUS CONTINUOUS
Status: DISCONTINUED | OUTPATIENT
Start: 2023-09-10 | End: 2023-09-12 | Stop reason: HOSPADM

## 2023-09-10 RX ORDER — FENTANYL CITRATE 50 UG/ML
INJECTION, SOLUTION INTRAMUSCULAR; INTRAVENOUS
Status: COMPLETED | OUTPATIENT
Start: 2023-09-10 | End: 2023-09-10

## 2023-09-10 RX ORDER — BUPIVACAINE HYDROCHLORIDE 2.5 MG/ML
INJECTION, SOLUTION EPIDURAL; INFILTRATION; INTRACAUDAL
Status: DISCONTINUED | OUTPATIENT
Start: 2023-09-10 | End: 2023-09-10

## 2023-09-10 RX ORDER — LIDOCAINE HYDROCHLORIDE 10 MG/ML
INJECTION, SOLUTION EPIDURAL; INFILTRATION; INTRACAUDAL; PERINEURAL
Status: DISCONTINUED
Start: 2023-09-10 | End: 2023-09-10 | Stop reason: WASHOUT

## 2023-09-10 RX ORDER — DIPHENHYDRAMINE HYDROCHLORIDE 50 MG/ML
25 INJECTION INTRAMUSCULAR; INTRAVENOUS EVERY 6 HOURS PRN
Status: DISCONTINUED | OUTPATIENT
Start: 2023-09-10 | End: 2023-09-12 | Stop reason: HOSPADM

## 2023-09-10 RX ORDER — CITRIC ACID/SODIUM CITRATE 334-500MG
30 SOLUTION, ORAL ORAL
Status: COMPLETED | OUTPATIENT
Start: 2023-09-10 | End: 2023-09-10

## 2023-09-10 RX ORDER — ONDANSETRON 2 MG/ML
INJECTION INTRAMUSCULAR; INTRAVENOUS PRN
Status: DISCONTINUED | OUTPATIENT
Start: 2023-09-10 | End: 2023-09-10

## 2023-09-10 RX ORDER — OXYTOCIN 10 [USP'U]/ML
10 INJECTION, SOLUTION INTRAMUSCULAR; INTRAVENOUS
Status: DISCONTINUED | OUTPATIENT
Start: 2023-09-10 | End: 2023-09-10 | Stop reason: HOSPADM

## 2023-09-10 RX ORDER — CARBOPROST TROMETHAMINE 250 UG/ML
250 INJECTION, SOLUTION INTRAMUSCULAR
Status: DISCONTINUED | OUTPATIENT
Start: 2023-09-10 | End: 2023-09-10 | Stop reason: HOSPADM

## 2023-09-10 RX ORDER — PROCHLORPERAZINE 25 MG
25 SUPPOSITORY, RECTAL RECTAL EVERY 12 HOURS PRN
Status: DISCONTINUED | OUTPATIENT
Start: 2023-09-10 | End: 2023-09-12 | Stop reason: HOSPADM

## 2023-09-10 RX ORDER — AZITHROMYCIN 500 MG/5ML
INJECTION, POWDER, LYOPHILIZED, FOR SOLUTION INTRAVENOUS
Status: DISCONTINUED
Start: 2023-09-10 | End: 2023-09-10 | Stop reason: HOSPADM

## 2023-09-10 RX ORDER — PROCHLORPERAZINE MALEATE 10 MG
10 TABLET ORAL EVERY 6 HOURS PRN
Status: DISCONTINUED | OUTPATIENT
Start: 2023-09-10 | End: 2023-09-12 | Stop reason: HOSPADM

## 2023-09-10 RX ORDER — CEFAZOLIN SODIUM/WATER 2 G/20 ML
2 SYRINGE (ML) INTRAVENOUS
Status: COMPLETED | OUTPATIENT
Start: 2023-09-10 | End: 2023-09-10

## 2023-09-10 RX ORDER — OXYTOCIN/0.9 % SODIUM CHLORIDE 30/500 ML
340 PLASTIC BAG, INJECTION (ML) INTRAVENOUS CONTINUOUS PRN
Status: DISCONTINUED | OUTPATIENT
Start: 2023-09-10 | End: 2023-09-12 | Stop reason: HOSPADM

## 2023-09-10 RX ORDER — FENTANYL CITRATE-0.9 % NACL/PF 10 MCG/ML
PLASTIC BAG, INJECTION (ML) INTRAVENOUS CONTINUOUS PRN
Status: DISCONTINUED | OUTPATIENT
Start: 2023-09-10 | End: 2023-09-10

## 2023-09-10 RX ORDER — NALOXONE HYDROCHLORIDE 0.4 MG/ML
0.2 INJECTION, SOLUTION INTRAMUSCULAR; INTRAVENOUS; SUBCUTANEOUS
Status: DISCONTINUED | OUTPATIENT
Start: 2023-09-10 | End: 2023-09-12 | Stop reason: HOSPADM

## 2023-09-10 RX ORDER — LIDOCAINE 40 MG/G
CREAM TOPICAL
Status: DISCONTINUED | OUTPATIENT
Start: 2023-09-10 | End: 2023-09-10 | Stop reason: HOSPADM

## 2023-09-10 RX ORDER — FENTANYL CITRATE 50 UG/ML
INJECTION, SOLUTION INTRAMUSCULAR; INTRAVENOUS
Status: COMPLETED | OUTPATIENT
Start: 2023-09-09 | End: 2023-09-10

## 2023-09-10 RX ORDER — IBUPROFEN 800 MG/1
800 TABLET, FILM COATED ORAL EVERY 6 HOURS
Status: DISCONTINUED | OUTPATIENT
Start: 2023-09-11 | End: 2023-09-12 | Stop reason: HOSPADM

## 2023-09-10 RX ORDER — AMOXICILLIN 250 MG
1 CAPSULE ORAL 2 TIMES DAILY
Status: DISCONTINUED | OUTPATIENT
Start: 2023-09-10 | End: 2023-09-12 | Stop reason: HOSPADM

## 2023-09-10 RX ORDER — MORPHINE SULFATE 1 MG/ML
INJECTION, SOLUTION EPIDURAL; INTRATHECAL; INTRAVENOUS
Status: COMPLETED | OUTPATIENT
Start: 2023-09-10 | End: 2023-09-10

## 2023-09-10 RX ORDER — CEFAZOLIN SODIUM/WATER 2 G/20 ML
SYRINGE (ML) INTRAVENOUS
Status: DISCONTINUED
Start: 2023-09-10 | End: 2023-09-10 | Stop reason: HOSPADM

## 2023-09-10 RX ORDER — METOCLOPRAMIDE HYDROCHLORIDE 5 MG/ML
10 INJECTION INTRAMUSCULAR; INTRAVENOUS EVERY 6 HOURS PRN
Status: DISCONTINUED | OUTPATIENT
Start: 2023-09-10 | End: 2023-09-12 | Stop reason: HOSPADM

## 2023-09-10 RX ORDER — METOCLOPRAMIDE 10 MG/1
10 TABLET ORAL EVERY 6 HOURS PRN
Status: DISCONTINUED | OUTPATIENT
Start: 2023-09-10 | End: 2023-09-12 | Stop reason: HOSPADM

## 2023-09-10 RX ORDER — OXYTOCIN/0.9 % SODIUM CHLORIDE 30/500 ML
340 PLASTIC BAG, INJECTION (ML) INTRAVENOUS CONTINUOUS PRN
Status: DISCONTINUED | OUTPATIENT
Start: 2023-09-10 | End: 2023-09-10 | Stop reason: HOSPADM

## 2023-09-10 RX ORDER — METHYLERGONOVINE MALEATE 0.2 MG/ML
200 INJECTION INTRAVENOUS
Status: DISCONTINUED | OUTPATIENT
Start: 2023-09-10 | End: 2023-09-12 | Stop reason: HOSPADM

## 2023-09-10 RX ORDER — ACETAMINOPHEN 325 MG/1
975 TABLET ORAL EVERY 6 HOURS
Status: DISCONTINUED | OUTPATIENT
Start: 2023-09-10 | End: 2023-09-12 | Stop reason: HOSPADM

## 2023-09-10 RX ORDER — SODIUM CHLORIDE, SODIUM LACTATE, POTASSIUM CHLORIDE, CALCIUM CHLORIDE 600; 310; 30; 20 MG/100ML; MG/100ML; MG/100ML; MG/100ML
INJECTION, SOLUTION INTRAVENOUS CONTINUOUS
Status: DISCONTINUED | OUTPATIENT
Start: 2023-09-10 | End: 2023-09-10 | Stop reason: HOSPADM

## 2023-09-10 RX ORDER — MISOPROSTOL 200 UG/1
800 TABLET ORAL
Status: DISCONTINUED | OUTPATIENT
Start: 2023-09-10 | End: 2023-09-10 | Stop reason: HOSPADM

## 2023-09-10 RX ORDER — SODIUM CHLORIDE, SODIUM LACTATE, POTASSIUM CHLORIDE, CALCIUM CHLORIDE 600; 310; 30; 20 MG/100ML; MG/100ML; MG/100ML; MG/100ML
INJECTION, SOLUTION INTRAVENOUS CONTINUOUS PRN
Status: DISCONTINUED | OUTPATIENT
Start: 2023-09-10 | End: 2023-09-10

## 2023-09-10 RX ORDER — BUPIVACAINE HYDROCHLORIDE 7.5 MG/ML
INJECTION, SOLUTION INTRASPINAL
Status: COMPLETED | OUTPATIENT
Start: 2023-09-09 | End: 2023-09-09

## 2023-09-10 RX ORDER — ONDANSETRON 4 MG/1
4 TABLET, ORALLY DISINTEGRATING ORAL EVERY 6 HOURS PRN
Status: DISCONTINUED | OUTPATIENT
Start: 2023-09-10 | End: 2023-09-12 | Stop reason: HOSPADM

## 2023-09-10 RX ORDER — CARBOPROST TROMETHAMINE 250 UG/ML
250 INJECTION, SOLUTION INTRAMUSCULAR
Status: DISCONTINUED | OUTPATIENT
Start: 2023-09-10 | End: 2023-09-12 | Stop reason: HOSPADM

## 2023-09-10 RX ORDER — AMOXICILLIN 250 MG
2 CAPSULE ORAL 2 TIMES DAILY
Status: DISCONTINUED | OUTPATIENT
Start: 2023-09-10 | End: 2023-09-12 | Stop reason: HOSPADM

## 2023-09-10 RX ORDER — OXYTOCIN/0.9 % SODIUM CHLORIDE 30/500 ML
100-340 PLASTIC BAG, INJECTION (ML) INTRAVENOUS CONTINUOUS PRN
Status: DISCONTINUED | OUTPATIENT
Start: 2023-09-10 | End: 2023-09-12 | Stop reason: HOSPADM

## 2023-09-10 RX ORDER — KETOROLAC TROMETHAMINE 30 MG/ML
30 INJECTION, SOLUTION INTRAMUSCULAR; INTRAVENOUS EVERY 6 HOURS
Status: COMPLETED | OUTPATIENT
Start: 2023-09-10 | End: 2023-09-11

## 2023-09-10 RX ORDER — SIMETHICONE 80 MG
80 TABLET,CHEWABLE ORAL 4 TIMES DAILY PRN
Status: DISCONTINUED | OUTPATIENT
Start: 2023-09-10 | End: 2023-09-12 | Stop reason: HOSPADM

## 2023-09-10 RX ORDER — IBUPROFEN 800 MG/1
800 TABLET, FILM COATED ORAL
Status: DISCONTINUED | OUTPATIENT
Start: 2023-09-10 | End: 2023-09-12 | Stop reason: HOSPADM

## 2023-09-10 RX ORDER — TRANEXAMIC ACID 10 MG/ML
1 INJECTION, SOLUTION INTRAVENOUS EVERY 30 MIN PRN
Status: DISCONTINUED | OUTPATIENT
Start: 2023-09-10 | End: 2023-09-12 | Stop reason: HOSPADM

## 2023-09-10 RX ORDER — HYDROCORTISONE 25 MG/G
CREAM TOPICAL 3 TIMES DAILY PRN
Status: DISCONTINUED | OUTPATIENT
Start: 2023-09-10 | End: 2023-09-12 | Stop reason: HOSPADM

## 2023-09-10 RX ORDER — OXYCODONE HYDROCHLORIDE 5 MG/1
5 TABLET ORAL EVERY 4 HOURS PRN
Status: DISCONTINUED | OUTPATIENT
Start: 2023-09-10 | End: 2023-09-12 | Stop reason: HOSPADM

## 2023-09-10 RX ORDER — ACETAMINOPHEN 325 MG/1
975 TABLET ORAL ONCE
Status: COMPLETED | OUTPATIENT
Start: 2023-09-10 | End: 2023-09-10

## 2023-09-10 RX ORDER — LIDOCAINE HYDROCHLORIDE 10 MG/ML
INJECTION, SOLUTION EPIDURAL; INFILTRATION; INTRACAUDAL; PERINEURAL
Status: DISCONTINUED
Start: 2023-09-10 | End: 2023-09-10 | Stop reason: HOSPADM

## 2023-09-10 RX ADMIN — KETOROLAC TROMETHAMINE 30 MG: 30 INJECTION, SOLUTION INTRAMUSCULAR; INTRAVENOUS at 07:53

## 2023-09-10 RX ADMIN — PHENYLEPHRINE HYDROCHLORIDE 100 MCG: 10 INJECTION INTRAVENOUS at 06:32

## 2023-09-10 RX ADMIN — ESCITALOPRAM OXALATE 5 MG: 5 TABLET, FILM COATED ORAL at 14:10

## 2023-09-10 RX ADMIN — Medication 11 MILLI-UNITS/MIN: at 00:52

## 2023-09-10 RX ADMIN — BUPIVACAINE 20 ML: 13.3 INJECTION, SUSPENSION, LIPOSOMAL INFILTRATION at 08:43

## 2023-09-10 RX ADMIN — BUPIVACAINE HYDROCHLORIDE 1.4 ML: 7.5 INJECTION, SOLUTION INTRASPINAL at 05:25

## 2023-09-10 RX ADMIN — BUPIVACAINE HYDROCHLORIDE 20 ML: 2.5 INJECTION, SOLUTION EPIDURAL; INFILTRATION; INTRACAUDAL at 08:43

## 2023-09-10 RX ADMIN — Medication 2 G: at 05:08

## 2023-09-10 RX ADMIN — SODIUM CITRATE AND CITRIC ACID MONOHYDRATE 30 ML: 500; 334 SOLUTION ORAL at 04:54

## 2023-09-10 RX ADMIN — OXYCODONE HYDROCHLORIDE 5 MG: 5 TABLET ORAL at 16:09

## 2023-09-10 RX ADMIN — Medication 500 MG: at 04:58

## 2023-09-10 RX ADMIN — FENTANYL CITRATE 15 MCG: 50 INJECTION, SOLUTION INTRAMUSCULAR; INTRAVENOUS at 05:25

## 2023-09-10 RX ADMIN — SENNOSIDES AND DOCUSATE SODIUM 2 TABLET: 50; 8.6 TABLET ORAL at 19:59

## 2023-09-10 RX ADMIN — PHENYLEPHRINE HYDROCHLORIDE 50 MCG/MIN: 10 INJECTION INTRAVENOUS at 05:16

## 2023-09-10 RX ADMIN — ACETAMINOPHEN 975 MG: 325 TABLET, FILM COATED ORAL at 19:59

## 2023-09-10 RX ADMIN — PHENYLEPHRINE HYDROCHLORIDE 100 MCG: 10 INJECTION INTRAVENOUS at 05:16

## 2023-09-10 RX ADMIN — Medication 10 MG: at 06:39

## 2023-09-10 RX ADMIN — KETOROLAC TROMETHAMINE 30 MG: 30 INJECTION, SOLUTION INTRAMUSCULAR; INTRAVENOUS at 14:11

## 2023-09-10 RX ADMIN — FENTANYL CITRATE 50 MCG: 50 INJECTION, SOLUTION INTRAMUSCULAR; INTRAVENOUS at 06:39

## 2023-09-10 RX ADMIN — Medication 10 MG: at 06:31

## 2023-09-10 RX ADMIN — KETOROLAC TROMETHAMINE 30 MG: 30 INJECTION, SOLUTION INTRAMUSCULAR; INTRAVENOUS at 20:00

## 2023-09-10 RX ADMIN — ONDANSETRON 4 MG: 2 INJECTION INTRAMUSCULAR; INTRAVENOUS at 05:48

## 2023-09-10 RX ADMIN — MORPHINE SULFATE 0.15 MG: 1 INJECTION, SOLUTION EPIDURAL; INTRATHECAL; INTRAVENOUS at 05:05

## 2023-09-10 RX ADMIN — OXYCODONE HYDROCHLORIDE 5 MG: 5 TABLET ORAL at 21:43

## 2023-09-10 RX ADMIN — DEXMEDETOMIDINE HYDROCHLORIDE 12 MCG: 100 INJECTION, SOLUTION INTRAVENOUS at 06:44

## 2023-09-10 RX ADMIN — TRANEXAMIC ACID 1 G: 1 INJECTION, SOLUTION INTRAVENOUS at 06:21

## 2023-09-10 RX ADMIN — FENTANYL CITRATE 50 MCG: 50 INJECTION, SOLUTION INTRAMUSCULAR; INTRAVENOUS at 06:23

## 2023-09-10 RX ADMIN — Medication 10 MG: at 06:43

## 2023-09-10 RX ADMIN — PHENYLEPHRINE HYDROCHLORIDE 100 MCG: 10 INJECTION INTRAVENOUS at 05:17

## 2023-09-10 RX ADMIN — ACETAMINOPHEN 975 MG: 325 TABLET, FILM COATED ORAL at 14:12

## 2023-09-10 RX ADMIN — SODIUM CHLORIDE, POTASSIUM CHLORIDE, SODIUM LACTATE AND CALCIUM CHLORIDE: 600; 310; 30; 20 INJECTION, SOLUTION INTRAVENOUS at 05:08

## 2023-09-10 RX ADMIN — SODIUM CHLORIDE, POTASSIUM CHLORIDE, SODIUM LACTATE AND CALCIUM CHLORIDE: 600; 310; 30; 20 INJECTION, SOLUTION INTRAVENOUS at 01:30

## 2023-09-10 RX ADMIN — MORPHINE SULFATE 0.15 MG: 1 INJECTION EPIDURAL; INTRATHECAL; INTRAVENOUS at 05:25

## 2023-09-10 RX ADMIN — FENTANYL CITRATE 15 MCG: 50 INJECTION, SOLUTION INTRAMUSCULAR; INTRAVENOUS at 05:05

## 2023-09-10 RX ADMIN — Medication 600 ML/HR: at 05:48

## 2023-09-10 RX ADMIN — ACETAMINOPHEN 975 MG: 325 TABLET, FILM COATED ORAL at 04:43

## 2023-09-10 ASSESSMENT — ACTIVITIES OF DAILY LIVING (ADL)
ADLS_ACUITY_SCORE: 24

## 2023-09-10 NOTE — ANESTHESIA PROCEDURE NOTES
"Intrathecal injection Procedure Note    Pre-Procedure   Staff -        Anesthesiologist:  Alayna Martin MD       Performed By: anesthesiologist       Pre-Anesthestic Checklist: patient identified, IV checked, risks and benefits discussed, informed consent, monitors and equipment checked, pre-op evaluation, at physician/surgeon's request and post-op pain management  Timeout:       Correct Patient: Yes        Correct Procedure: Yes        Correct Site: Yes        Correct Position: Yes   Procedure Documentation  Procedure: intrathecal injection       Patient Position: sitting       Skin prep: Chloraprep       Insertion Site: L3-4. (midline approach).       Needle Gauge: 22.        Needle Length (Inches): 3        Spinal Needle Type: Pencan       Introducer used       Introducer: 20 G       # of attempts: 1 and  # of redirects:  0    Assessment/Narrative         Paresthesias: No.       CSF fluid: clear.       Opening pressure was cmH2O while  Sitting.      Medication(s) Administered   0.75% Hyperbaric Bupivacaine (Intrathecal) - Intrathecal   1.4 mL - 9/10/2023 5:25:00 AM  Fentanyl PF (Intrathecal) - Intrathecal   15 mcg - 9/10/2023 5:25:00 AM  Morphine PF 1 mg/mL (Intrathecal) - Intrathecal   0.15 mg - 9/10/2023 5:25:00 AM   Comments:  No level 20 minutes after  initial spinal attempt. Repeated spinal with T1 level. Pt anxious and states that is difficult to breath. VSS stable, pt saturation 95 on RA. Reverse T position applied, started giving oxygen via mask and ready to intubate. By the time surgeons scrubbed, pt is feeling better and is ok to continue without intubation      FOR [facility] ([facility]) ONLY:   Pain Team Contact information: please page the Pain Team Via [employer]. Search \"Pain\". During daytime hours, please page the attending first. At night please page the resident first.      "

## 2023-09-10 NOTE — PLAN OF CARE
Patient arrived to [facility] unit via zoom cart at 9:40 ,with belongings, accompanied by spouse/ significant other, with infant in arms. Received report from  KINJAL Patterson  and checked bands. Unit and room orientation completd. Call light given; no concerns present at this time. Continue with plan of care.

## 2023-09-10 NOTE — ANESTHESIA PROCEDURE NOTES
TAP Procedure Note    Pre-Procedure   Staff -        Anesthesiologist:  Beto Siegel MD       Resident/Fellow: Knvg Fenton MD       Performed By: resident       Location: OB       Procedure Start/Stop Times: 9/10/2023 8:43 AM and 9/10/2023 8:51 AM       Pre-Anesthestic Checklist: patient identified, IV checked, site marked, risks and benefits discussed, informed consent, monitors and equipment checked, pre-op evaluation, at physician/surgeon's request and post-op pain management  Timeout:       Correct Patient: Yes        Correct Procedure: Yes        Correct Site: Yes        Correct Position: Yes        Correct Laterality: Yes   Procedure Documentation  Procedure: TAP       Laterality: bilateral       Patient Position: supine       Patient Prep/Sterile Barriers: sterile gloves, mask       Skin prep: Chloraprep       Needle Type: short bevel       Needle Gauge: 21.        Needle Length (millimeters): 110        Ultrasound guided       1. Ultrasound was used to identify targeted nerve, plexus, vascular marker, or fascial plane and place a needle adjacent to it in real-time.       2. Ultrasound was used to visualize the spread of anesthetic in close proximity to the above referenced structure.       3. A permanent image is entered into the patient's record.       4. The visualized anatomic structures appeared normal.       5. There were no apparent abnormal pathologic findings.    Assessment/Narrative         The placement was negative for: blood aspirated, painful injection and site bleeding       Paresthesias: No.       Bolus given via needle..        Secured via.        Insertion/Infusion Method: Single Shot       Complications: none    Medication(s) Administered   Bupivacaine 0.25% PF (Infiltration) - Infiltration   20 mL - 9/10/2023 8:43:00 AM  Bupivacaine liposome (Exparel) 1.3% LA inj susp (Infiltration) - Infiltration   20 mL - 9/10/2023 8:43:00 AM  Medication Administration Time: 9/10/2023  "8:43 AM     Comments:  Done in PACU. Consent conversation originally done by night anesthesiologist however no signature obtained at that time.  Procedure done under emergent surgical consent. No questions from patient and in agreement with plan.      FOR [facility] ([facility]) ONLY:   Pain Team Contact information: please page the Pain Team Via [employer]. Search \"Pain\". During daytime hours, please page the attending first. At night please page the resident first.      "

## 2023-09-10 NOTE — LACTATION NOTE
This note was copied from a baby's chart.  Consult for:  LGA baby, first time breastfeeding    Infant Name: Undecided    Infant's Primary Care Clinic: [facility]    Delivery Information:  Infant was born at 40w3d via   delivery on 9/10/2023 5:45 AM     Maternal Health History:    Information for the patient's mother:  Zara Kaminski [[id number]]     Patient Active Problem List   Diagnosis    Situational anxiety    Trauma and stressor-related disorder    Vision impairment    Former tobacco use    Benign neoplasm of skin of face    ADHD (attention deficit hyperactivity disorder)    Need for Tdap vaccination    Pregnancy      Zara takes Lexapro and Adderall. Peds aware per note.    Maternal Breast Exam/Breastfeeding History:  Zara noted breast growth and sensitivity in early pregnancy. She denies any history of breast/chest injury or surgery.  Her breasts are soft and symmetrical. Zara has bilateral everted nipples with partial inversion in the center of each nipple. She has been able to hand express colostrum, and recently pumped about 2-3ml.    Infant information: Infant has age appropriate output. He was LGA at birth at 9lb 13 oz. He is 12 hours old. He is getting supplemental milk due to hypoglycemia.     Oral exam of baby: Deferred as infant asleep and gagging when attempting to assess.     Feeding History: Zara shares infant was given donor milk for first feeding after birth. He has latched a few times since birth and parents share he has been sleepy and disinterested at the breast. He has received supplemental donor milk and maternal ebm via bottle an cup.     Feeding Assessment: Attempted to assist with latch at 1830. Infant had been cup fed 3ml of ebm prior to latching. Infant was alert but not showing feeding cues or interest at the breast. We were able to latch infant briefly with 2-3 non-nutritive sucks at the breast before again coming off disinterested with mouth  closed. Zara was encouraged to keep infant skin to skin for a bit an re-attempt latch if he shows interest. Zara was encouraged to hand express or pump for breast stimulation if infant requires supplementation or is too sleepy too latch.     Education:   - Expected  feeding patterns in the first few days (pg. 38 of Your Guide to To Postpartum and  Care)/ the Second Night  - Stages of milk production  - Early feeding cues     - Benefits of skin to skin  - Breastfeeding positions  - How to tell if baby is getting enough  - Expected  output  - [place] weight loss  - Infant Feeding Log  - [employer] Breastfeeding/Pumping videos  - Benefits of hand expression of colostrum  - Pumping recommendations   - Room temperature milk storage recommendations  - Inpatient breastfeeding support  - Outpatient lactation resources    Handouts: Infant Feeding Log (Week 1, Your Guide to Postpartum &  Care Book)    Plan: Continue breastfeeding on cue with RN support as needed with a goal of 8-12 feedings per day. Continue hand expressing and pumping when supplements are given.     Encouraged parents to call for support with feedings. Lactation will continue to support while inpatient.       Mimi Escalona RN, IBCLC   Lactation Consultant  Ascom: *[phone]  Office: [phone]

## 2023-09-10 NOTE — ANESTHESIA CARE TRANSFER NOTE
Patient: Zara Kaminski    Procedure: Procedure(s):   section       Diagnosis: * No pre-op diagnosis entered *  Diagnosis Additional Information: No value filed.    Anesthesia Type:   Epidural     Note:    Oropharynx: oropharynx clear of all foreign objects and spontaneously breathing  Level of Consciousness: awake  Oxygen Supplementation: room air    Independent Airway: airway patency satisfactory and stable  Dentition: dentition unchanged  Vital Signs Stable: post-procedure vital signs reviewed and stable  Report to RN Given: handoff report given  Patient transferred to: Labor and Delivery    Handoff Report: Identifed the Patient, Identified the Reponsible Provider, Reviewed the pertinent medical history, Discussed the surgical course, Reviewed Intra-OP anesthesia mangement and issues during anesthesia, Set expectations for post-procedure period and Allowed opportunity for questions and acknowledgement of understanding      Vitals:  Vitals Value Taken Time   /61 09/10/23 0714   Temp     Pulse 65 09/10/23 0721   Resp     SpO2 98 % 09/10/23 0721   Vitals shown include unvalidated device data.    Electronically Signed By: Kvng Fenton MD  September 10, 2023  7:22 AM

## 2023-09-10 NOTE — PLAN OF CARE
VSS. A-febrile. Patient has epidural in place and is able to rest comfortably through contractions. Patient repositioned every 30-45 minutes.Patient has pitocin infusing. Category 1 tracing. Baby had 1 episode of a prolonged decel during shift. Repositioning, 500ml LR bolus given, pitocin reduced by half. Baby also had recurrent variable decels during pushing. Mom pushed with good effort for 3 hours with little change in baby decent. During pushing, mom became very uncomfortable and anesthesia was called to look at her epidural. Extra medicine was administered by anesthesia and mom was once again able to rest between contractions/pushing. After 3 hours of pushing, Providers recommended a  due to baby's large size and failure to decend and patient consented to a . Patient brought back to the OR at 0500 and care was taken over at this time by another nurse who circulates.

## 2023-09-10 NOTE — OP NOTE
[facility]  Operative Note     Surgery Date:  9/10/2023  Surgeon:  Gretchen Hernandez MD  Assistants:  Fortino Mccartney MD PGY3    Pre-op Diagnosis:    - Intrauterine pregnancy at 40w3d  - Suspected Fetal Macrosomia  - Arrest of Descent       Post-op Diagnosis:    - Same   - Liveborn Male infant     Procedure:    - Primary low transverse  section with single layer uterine closure via Pfannenstiel incision    Anesthesia:  Sinal and Local  QBL:    1306 mL  IVF:    700 mL crystalloid  UOP:    200 mL cherry red urine at the beginning and the end of the case  Drains:   Weathers Catheter   Specimens:   Routine cord blood/segment  Antibiotics:  2g Ancef, 500mg Azithromycin  Additional medications: 1g TXA IV  Complications:   Postpartum hemorrhage    Indications:   Zara Kaminski is a 33 year old  at 40w3d admitted for IOL for later term. Pregnancy notable for suspected fetal macrosomia. Patient progressed to complete and pushed for 3 hours with minimal descent, meeting criteria for arrest of descent and a primary  section was recommended. The patient was agreeable to delivery via  sction. The risks, benefits, and alternatives of  section were discussed with the patient, and she agreed to proceed.     Findings:   No Adhesions  Right uterine extension on the right side of hysterotomy down the lower uterine segment 3 cm.  Clear amniotic fluid  Liveborn male infant in OP presentation. Born at 0545 on 09/10/23. Apgars 8 at 1 minute & 9 at 5 minutes. Weight 4.45kg.  Normal uterus, fallopian tubes, and ovaries.   Cord gasses below    Procedure Details:   The patient was brought to the OR, where adequate Spinal anesthesia was administered. She was placed in the dorsal supine position with a slight leftward tilt. She was prepped and draped in the usual sterile fashion. A surgical time out was performed. A pfannenstiel skin incision was made with the scalpel, and carried down to  the underlying fascia with sharp and blunt dissection. The fascia was incised in the midline, and the incision was extended bluntly. The rectus muscles were  in the midline, and the peritoneum was entered bluntly, and the opening was extended with digital pressure and electrocautery. The bladder blade was placed. The vesicoperitoneum was elevated and dissected with the Metzenbaum scissors and bluntly extended inferiorly. The bladder blade was replaced. A transverse hysterotomy was made with the scalpel in the lower uterine segment, and the incision was extended with digital pressure. The infant was noted to be in the OP position, and was delivered atraumatically. The shoulders delivered easily. No nuchal cord was noted. The cord was doubly clamped and cut after 60 seconds, and the infant was handed off to the awaiting nursery staff. A segment of cord was cut and sent to lab. Cord gasses were collected. The placenta was delivered with gentle traction on the umbilical cord and uterine massage. The placenta was not sent for pathology. The uterus was cleared of all clots and debris. Uterine tone was noted to be improving with 30 units of pitocin given through the running IV and uterine massage. The hysterotomy was closed with a running locked suture of 0 Vicryl starting in the right extension. An 0 Monocryl suture was anchored midway into the extension and then run in looked fashion towards midline. An additional series of running locked 0 Vicryl suture was applied to the right hysterotomy. The hysterotomy was noted to be hemostatic. The posterior cul-de-sac was cleared of all clots and debris. The pericolic gutters were cleared of all clots and debris. The hysterotomy was reexamined and noted to be hemostatic. The fascia and rectus muscles were examined and areas of oozing were controlled with electrocautery. The fascia was closed with a running 0 Vicryl suture. The subcutaneous tissue was irrigated and areas  of oozing were controlled with electrocautery. The subcutaneous tissue was less than 2cm in thickness, and was therefore not closed. The skin was closed with a running subcuticular 4-0 Monocryl suture and covered with a sterile dressing.    All sponge, needle, and instrument counts were correct. The patient tolerated the procedure well, and was transferred to recovery in stable condition. Dr. eHrnandez was present and scrubbed for the procedure.     Fortino Mccartney MD, MPH  Ob/Gyn Resident, PGY-3  09/10/23 6:58 AM

## 2023-09-10 NOTE — ANESTHESIA POSTPROCEDURE EVALUATION
Patient: Zara Kaminski    Procedure: Procedure(s):   section       Anesthesia Type:  Epidural    Note:  Disposition: Inpatient   Postop Pain Control: Uneventful            Sign Out: Well controlled pain   PONV: No   Neuro/Psych: Uneventful            Sign Out: Acceptable/Baseline neuro status   Airway/Respiratory: Uneventful            Sign Out: Acceptable/Baseline resp. status   CV/Hemodynamics: Uneventful            Sign Out: Acceptable CV status; No obvious hypovolemia; No obvious fluid overload   Other NRE:    DID A NON-ROUTINE EVENT OCCUR? No    Event details/Postop Comments:  Potential high spinal but did not need intubation, then had more discomfort than would be expected needing additional sedation/IV analgesics. Discussed with patient and family afterward and no current questions.      Epidural-to- Updated ASA: 2 Emergent      Last vitals:  Vitals Value Taken Time   /61 09/10/23 0845   Temp 36.2  C (97.2  F) 09/10/23 0830   Pulse 58 09/10/23 0845   Resp 16 09/10/23 0845   SpO2 100 % 09/10/23 0845       Electronically Signed By: Beto Siegel MD  September 10, 2023  3:41 PM

## 2023-09-10 NOTE — PROGRESS NOTES
"Brief Progress Note:    Presented to patient room for labor check. Comfortable with epidural.    O: /56   Pulse 78   Temp 97.9  F (36.6  C) (Oral)   Resp 18   Ht 1.702 m (5' 7\")   Wt 91.6 kg (202 lb)   LMP 2022   SpO2 97%   BMI 31.64 kg/m      SVE: 4/70/-2    NST:  reactive  FHT:  140/mod/+ accels, no decels  Cat:   1  [place]:  q 3 min    A/P 33 year old  at 40w2d admitted for IOL.    - continue pitocin, comfortable with epidural  - s/p AROM at 1400  - Suspected LGA, s/p Reviewed risk of SD.    Fortino Mccartney MD, MPH  Ob/Gyn Resident, PGY-3        "

## 2023-09-10 NOTE — PROGRESS NOTES
Brief Progress Note:    Patient pushing with each contraction. Intermittently comfortable with epidural    SVE: Complete, -2 stations with head behind pubic symphisis  FHT: 145, moderate variability, accels, occasional variables.    A/P 33 year old  at 40w2d admitted for IOL.  - Continue pushing. Will reevaluate after 2.5 hours of pushing.  - s/p Discussion for arrest of decent  section if baby's head does not continue to descend into the pelvis.  - Epidural to be adjusted by anesthesia.    Fortino Mccartney MD, MPH  Ob/Gyn Resident, PGY-3  09/10/23 2:54 AM

## 2023-09-10 NOTE — PROGRESS NOTES
Attending Progress Note    Patient has been pushing for 3 hours and fetus has not descended into the pelvis.  She is pushing in the right spot, but head doesn't seem to be fitting behind the pubic bone.  Recommended  and patient disappointed, although in agreement with proceeding.    Discussed risks of , including but not limited to infection, blood loss requiring transfusion, injury to other structures in operating field, such as the bowels, bladder, ureters, blood vessels, nerves and the baby; delayed recognition of injury that could requiring readmission and further procedures, as well as remote possibility of life-saving hysterectomy.  Written consent was signed.    Epidural has been inadequate, so discussed with anesthesia and they'll place spinal.    Gretchen Hernandez MD

## 2023-09-10 NOTE — ANESTHESIA PROCEDURE NOTES
"Intrathecal injection Procedure Note    Pre-Procedure   Staff -        Anesthesiologist:  Alayna Martin MD       Performed By: anesthesiologist       Location: OB       Pre-Anesthestic Checklist: patient identified, IV checked, risks and benefits discussed, informed consent, monitors and equipment checked, pre-op evaluation, at physician/surgeon's request and post-op pain management  Timeout:       Correct Patient: Yes        Correct Procedure: Yes        Correct Site: Yes        Correct Position: Yes   Procedure Documentation  Procedure: intrathecal injection       Patient Position: sitting       Patient Prep/Sterile Barriers: sterile gloves, mask, patient draped       Skin prep: Chloraprep       Insertion Site: L4-5. (midline approach).       Needle Gauge: 25.        Needle Length (Inches): 3        Spinal Needle Type: PencanNo introducer used       # of attempts: 1 and  # of redirects:  0    Assessment/Narrative         Paresthesias: No.       CSF fluid: clear.       Opening pressure was cmH2O while  Sitting.      Medication(s) Administered   0.75% Hyperbaric Bupivacaine (Intrathecal) - Intrathecal   1.4 mL - 9/9/2023 5:05:00 AM  Fentanyl PF (Intrathecal) - Intrathecal   15 mcg - 9/10/2023 5:05:00 AM  Morphine PF 1 mg/mL (Intrathecal) - Intrathecal   0.15 mg - 9/10/2023 5:05:00 AM   Comments:  No level after injection for 20 minutes. Most likely aspirated fluid with spinal needle was local anesthetic from the epidural space.      FOR [facility] ([facility]) ONLY:   Pain Team Contact information: please page the Pain Team Via [employer]. Search \"Pain\". During daytime hours, please page the attending first. At night please page the resident first.      "

## 2023-09-10 NOTE — PLAN OF CARE
Goal Outcome Evaluation:      Plan of Care Reviewed With: patient, spouse    Overall Patient Progress: improvingOverall Patient Progress: improving       Data: VSS, postpartum assessments WNL. She currently has a reno in place w/adequate output.  Incision is covered w/bandage and drainage is marked.  She is eating and drinking without nausea. Lochia WNL. Fundus firm and midline. Breastfeeding infant and pumping with assistance. Taking toradol and tylenol for abdominal pain and Pt reports adequate pain management.   Action: Education provided on plan of care  Response: Pt is agreeable with her plan of care. Positive attachment behaviors observed with infant. Support person,  Ej, present. Anticipate discharge per care plan.

## 2023-09-10 NOTE — PROGRESS NOTES
"Brief Progress Note:    Presented to patient room for labor check. Comfortable with epidural.    O: /56   Pulse 78   Temp 97.9  F (36.6  C) (Oral)   Resp 18   Ht 1.702 m (5' 7\")   Wt 91.6 kg (202 lb)   LMP 2022   SpO2 97%   BMI 31.64 kg/m      SVE: 9/90/-2    NST:  reactive  FHT:  120/mod/+ accels, 7 minute decel at 2300  Cat:   1  [place]:  q 3 min    A/P 33 year old  at 40w2d admitted for IOL.    - pitocin turned down to 10 from 14, comfortable with epidural  - s/p AROM at 1400  - Suspected LGA, s/p Reviewed risk of SD.    Fortino Mccartney MD, MPH  Ob/Gyn Resident, PGY-3    "

## 2023-09-10 NOTE — PLAN OF CARE
OR to PACU Transfer Note  Data: Pt to OB PACU at 0511 via cart. PIV infusing NS with Pit and LR without complications, reno with clear urine to gravity. Pt denies of pain and/or nausea.   Interventions: IV to pump, monitors and alarms on, SCD on.  Plan: Patient instructed to notify RN for pain or nausea, routine post op cares, initiate breastfeeding/pumping as soon as patient/infant able. Report given to Yesenia CHRISTIANSEN RN

## 2023-09-10 NOTE — DISCHARGE SUMMARY
[facility] Discharge Summary    Zara Kaminski MRN# [id number]   Age: 33 year old YOB: 1990     Date of Admission:  2023  Date of Discharge:              2023  Admitting Physician:  Gretchen Hernandez MD  Discharge Physician:  Gretchen Dia MD              Admission Diagnoses:    at 40w3d  Hx of low lying placenta  Suspected macrosomia  MDD- on Lexapro   Hx of ADHD          Discharge Diagnosis:     Same, now , delivered   Postpartum hemorrhage  Acute blood loss anemia, stable           Procedures:     Procedure(s): Primary low transverse  section with single layer closure via Pfannenstiel skin incision  TAP block                  Medications Prior to Admission:     Medications Prior to Admission   Medication Sig Dispense Refill Last Dose    escitalopram (LEXAPRO) 5 MG tablet Take 1 tablet (5 mg) by mouth daily for 90 days 90 tablet 3 2023 at 0900    folic acid-vit B6-vit B12 (FOLGARD) 0.8-10-0.115 MG TABS per tablet Take by mouth daily   2023 at 0900    Prenatal Vit-Fe Fumarate-FA (PRENATAL MULTIVITAMIN W/IRON) 27-0.8 MG tablet Take 1 tablet by mouth daily   2023 at 0900    acetaminophen (TYLENOL) 325 MG tablet Take 2 tablets (650 mg) by mouth every 6 hours as needed for mild pain Start after Delivery. 100 tablet 0     ibuprofen (ADVIL/MOTRIN) 600 MG tablet Take 1 tablet (600 mg) by mouth every 6 hours as needed for moderate pain Start after delivery 60 tablet 0     Misc. Devices (BREAST PUMP) MISC 1 each continuous prn (breast feeding) (Patient not taking: Reported on 2023) 1 each 0     senna-docusate (SENOKOT-S/PERICOLACE) 8.6-50 MG tablet Take 1 tablet by mouth daily Start after delivery. 100 tablet 0     [DISCONTINUED] amphetamine-dextroamphetamine (ADDERALL XR) 20 MG 24 hr capsule Take 20 mg by mouth daily (Patient not taking: Reported on 2023)       [DISCONTINUED] amphetamine-dextroamphetamine (ADDERALL) 10 MG tablet  (Patient not taking:  Reported on 2023)                Discharge Medications:        Review of your medicines        START taking        Dose / Directions   acetaminophen 325 MG tablet  Commonly known as: TYLENOL  Used for: Encounter for supervision of normal first pregnancy in third trimester      Dose: 650 mg  Take 2 tablets (650 mg) by mouth every 6 hours as needed for mild pain Start after Delivery.  Quantity: 100 tablet  Refills: 0     cyclobenzaprine 10 MG tablet  Commonly known as: FLEXERIL  Used for: Acute post-operative pain      Dose: 10 mg  Take 1 tablet (10 mg) by mouth every 8 hours as needed for muscle spasms  Quantity: 12 tablet  Refills: 0     ferrous sulfate 325 (65 Fe) MG tablet  Commonly known as: FEROSUL  Used for: S/P primary low transverse       Dose: 325 mg  Take 1 tablet (325 mg) by mouth every other day  Quantity: 30 tablet  Refills: 1     ibuprofen 600 MG tablet  Commonly known as: ADVIL/MOTRIN  Used for: Encounter for supervision of normal first pregnancy in third trimester      Dose: 600 mg  Take 1 tablet (600 mg) by mouth every 6 hours as needed for moderate pain Start after delivery  Quantity: 60 tablet  Refills: 0     norethindrone 0.35 MG tablet  Commonly known as: MICRONOR  Used for: S/P primary low transverse       Dose: 0.35 mg  Take 1 tablet (0.35 mg) by mouth daily  Quantity: 84 tablet  Refills: 3     oxyCODONE 5 MG tablet  Commonly known as: ROXICODONE  Used for: S/P primary low transverse       Dose: 5 mg  Take 1 tablet (5 mg) by mouth every 6 hours as needed for pain  Quantity: 10 tablet  Refills: 0     senna-docusate 8.6-50 MG tablet  Commonly known as: SENOKOT-S/PERICOLACE  Used for: Encounter for supervision of normal first pregnancy in third trimester      Dose: 1 tablet  Take 1 tablet by mouth daily Start after delivery.  Quantity: 100 tablet  Refills: 0            CONTINUE these medicines which have NOT CHANGED        Dose / Directions   breast pump Misc  Used  for: Breast feeding status of mother      Dose: 1 each  1 each continuous prn (breast feeding)  Quantity: 1 each  Refills: 0     escitalopram 5 MG tablet  Commonly known as: LEXAPRO  Used for: Current episode of major depressive disorder without prior episode, unspecified depression episode severity      Dose: 5 mg  Take 1 tablet (5 mg) by mouth daily for 90 days  Quantity: 90 tablet  Refills: 3     folic acid-vit B6-vit B12 0.8-10-0.115 MG Tabs per tablet  Commonly known as: FOLGARD      Take by mouth daily  Refills: 0     prenatal multivitamin w/iron 27-0.8 MG tablet      Dose: 1 tablet  Take 1 tablet by mouth daily  Refills: 0            STOP taking      amphetamine-dextroamphetamine 10 MG tablet  Commonly known as: ADDERALL        amphetamine-dextroamphetamine 20 MG 24 hr capsule  Commonly known as: ADDERALL XR                  Where to get your medicines        These medications were sent to [facility] [address]  [address] [place] [address], [facility] [address]      Phone: [phone]   cyclobenzaprine 10 MG tablet  ferrous sulfate 325 (65 Fe) MG tablet  norethindrone 0.35 MG tablet  oxyCODONE 5 MG tablet               Consultations:   - Anesthesia            Brief Admission History and Labor Course:   Ms. Zara Kaminski is a 33 year old now P1 who initially presented at 40w3d for late term induction of labor in the setting of suspected fetal macrosomia. She was induced with pitocin and augmented with AROM. She progressed to complete and then began pushing. She pushed for 3 hours with good maternal effort but minimal fetal descent into the pelvis. At that time, recommendation for  due to arrest of descent. The patient agreed and was consented.        Intraoperative course   The procedure was complicated by postpartum hemorrhage which was treated with TXA.  EBL 1306 mL.     Findings:   No adhesions  Right uterine extension on the right side of hysterotomy down the  lower uterine segment 3 cm.  Clear amniotic fluid  Liveborn male infant in OP presentation. Born at 0545 on 09/10/23. Apgars 8 at 1 minute & 9 at 5 minutes. Weight 4.45kg.  Normal uterus, fallopian tubes, and ovaries.     See operative report for additional details.        Postpartum Course   The patient's hospital course was unremarkable.  She recovered as anticipated and experienced no post-operative complications. On discharge, her pain was well controlled. Vaginal bleeding is similar to peak menstrual flow.  Voiding without difficulty.  Ambulating well and tolerating a normal diet.  No fever or significant wound drainage.  Breastfeeding well.  Infant is stable. She had multiple bowel movements.  She was discharged on post-partum day #2.    Post-partum hemoglobin:   Hemoglobin   Date Value Ref Range Status   09/11/2023 9.3 (L) 11.7 - 15.7 g/dL Final   04/15/2010 12.9 11.7 - 15.7 g/dL Final             Discharge Instructions and Follow-Up:     Discharge diet: Regular   Discharge activity: No lifting greater than 20 lbs, pushing, pulling, or other strenuous activity for 6 weeks. Pelvic rest for 6 weeks including no sexual intercourse, tampons, or douching. No driving until you can slam on the brakes without pain or while on narcotic pain medications.    Discharge follow-up: Follow up with primary OB for routine postpartum visit in 6 weeks   Wound care: Keep incision clean and dry           Discharge Disposition:     Discharged to home in stable condition      Lashanda Shields MD, MPH, MS  Obstetrics, Gynecology & Women's Health   Resident, PGY-2  09/12/2023 8:14 AM        Physician Attestation   I saw and evaluated this patient prior to discharge.  I discussed the patient with the resident/fellow and agree with plan of care as documented in the note.      I personally reviewed vital signs, medications, labs, and exam .    I personally spent 20 minutes on discharge activities.    Gretchen Bruce MD  Date of  Service (when I saw the patient): 09/12/23

## 2023-09-10 NOTE — PLAN OF CARE
Data: Zara Kaminski transferred to [id number] via cart at 0925. Baby transferred via parent's arms.  Action: Receiving unit notified of transfer: Yes. Patient and family notified of room change. Report given to Keisha over the phone at 0820 with updates at time of transfer. Belongings sent to receiving unit. Accompanied by Registered Nurse. Oriented patient to surroundings. Call light within reach. ID bands double-checked with receiving RN.  Response: Patient tolerated transfer and is stable.

## 2023-09-10 NOTE — PROGRESS NOTES
"Brief Progress Note:    Presented to patient room for labor check. Comfortable with epidural.    Vital signs:  Temp: 97.7  F (36.5  C) Temp src: Oral BP: 93/50 Pulse: 68   Resp: 18 SpO2: 99 % O2 Device: None (Room air)   Height: 170.2 cm (5' 7\") Weight: 91.6 kg (202 lb)  Estimated body mass index is 31.64 kg/m  as calculated from the following:    Height as of this encounter: 1.702 m (5' 7\").    Weight as of this encounter: 91.6 kg (202 lb).    SVE: Complete  FHT: 135, Moderate variability, accels, no decels  [place]: q2-3 minutes    A/P 33 year old  at 40w2d admitted for IOL.    - pitocin at 11 comfortable with epidural  - plan to start pushing  - s/p AROM at 1400  - Suspected LGA, s/p Reviewed risk of SD.    Fortino Mccartney MD, MPH  Ob/Gyn Resident, PGY-3    "

## 2023-09-11 LAB — HGB BLD-MCNC: 9.3 G/DL (ref 11.7–15.7)

## 2023-09-11 PROCEDURE — 120N000002 HC R&B MED SURG/OB UMMC

## 2023-09-11 PROCEDURE — 250N000013 HC RX MED GY IP 250 OP 250 PS 637

## 2023-09-11 PROCEDURE — 250N000011 HC RX IP 250 OP 636: Mod: JZ

## 2023-09-11 PROCEDURE — 36415 COLL VENOUS BLD VENIPUNCTURE: CPT

## 2023-09-11 PROCEDURE — 85018 HEMOGLOBIN: CPT

## 2023-09-11 PROCEDURE — 250N000013 HC RX MED GY IP 250 OP 250 PS 637: Performed by: OBSTETRICS & GYNECOLOGY

## 2023-09-11 RX ORDER — FERROUS SULFATE 325(65) MG
325 TABLET ORAL EVERY OTHER DAY
Qty: 30 TABLET | Refills: 1 | Status: SHIPPED | OUTPATIENT
Start: 2023-09-11

## 2023-09-11 RX ORDER — OXYCODONE HYDROCHLORIDE 5 MG/1
5 TABLET ORAL EVERY 6 HOURS PRN
Qty: 10 TABLET | Refills: 0 | Status: SHIPPED | OUTPATIENT
Start: 2023-09-11 | End: 2023-09-14

## 2023-09-11 RX ORDER — ACETAMINOPHEN AND CODEINE PHOSPHATE 120; 12 MG/5ML; MG/5ML
0.35 SOLUTION ORAL DAILY
Qty: 84 TABLET | Refills: 3 | Status: SHIPPED | OUTPATIENT
Start: 2023-09-11

## 2023-09-11 RX ADMIN — SENNOSIDES AND DOCUSATE SODIUM 2 TABLET: 50; 8.6 TABLET ORAL at 08:43

## 2023-09-11 RX ADMIN — IBUPROFEN 800 MG: 800 TABLET ORAL at 08:42

## 2023-09-11 RX ADMIN — OXYCODONE HYDROCHLORIDE 5 MG: 5 TABLET ORAL at 02:34

## 2023-09-11 RX ADMIN — SENNOSIDES AND DOCUSATE SODIUM 2 TABLET: 50; 8.6 TABLET ORAL at 20:14

## 2023-09-11 RX ADMIN — ACETAMINOPHEN 975 MG: 325 TABLET, FILM COATED ORAL at 08:43

## 2023-09-11 RX ADMIN — IBUPROFEN 800 MG: 800 TABLET ORAL at 21:06

## 2023-09-11 RX ADMIN — OXYCODONE HYDROCHLORIDE 5 MG: 5 TABLET ORAL at 20:05

## 2023-09-11 RX ADMIN — SIMETHICONE 80 MG: 80 TABLET, CHEWABLE ORAL at 20:14

## 2023-09-11 RX ADMIN — ACETAMINOPHEN 975 MG: 325 TABLET, FILM COATED ORAL at 21:06

## 2023-09-11 RX ADMIN — KETOROLAC TROMETHAMINE 30 MG: 30 INJECTION, SOLUTION INTRAMUSCULAR; INTRAVENOUS at 02:10

## 2023-09-11 RX ADMIN — ACETAMINOPHEN 975 MG: 325 TABLET, FILM COATED ORAL at 14:50

## 2023-09-11 RX ADMIN — OXYCODONE HYDROCHLORIDE 5 MG: 5 TABLET ORAL at 09:51

## 2023-09-11 RX ADMIN — SIMETHICONE 80 MG: 80 TABLET, CHEWABLE ORAL at 09:51

## 2023-09-11 RX ADMIN — ESCITALOPRAM OXALATE 5 MG: 5 TABLET, FILM COATED ORAL at 08:43

## 2023-09-11 RX ADMIN — SIMETHICONE 80 MG: 80 TABLET, CHEWABLE ORAL at 14:50

## 2023-09-11 RX ADMIN — IBUPROFEN 800 MG: 800 TABLET ORAL at 14:50

## 2023-09-11 RX ADMIN — OXYCODONE HYDROCHLORIDE 5 MG: 5 TABLET ORAL at 14:54

## 2023-09-11 RX ADMIN — ACETAMINOPHEN 975 MG: 325 TABLET, FILM COATED ORAL at 02:10

## 2023-09-11 ASSESSMENT — ACTIVITIES OF DAILY LIVING (ADL)
ADLS_ACUITY_SCORE: 20
ADLS_ACUITY_SCORE: 24
ADLS_ACUITY_SCORE: 20
ADLS_ACUITY_SCORE: 24

## 2023-09-11 NOTE — PROGRESS NOTES
"  Anesthesia Postpartum  Section with Spinal Anesthesia    Patient: Zara Kaminski    Patient location: Postpartum floor    Chief complaint: Acute postoperative pain management s/p spinal anesthetic.    Procedure(s) Performed:  Procedure(s):   section    Anesthesia type: Spinal Block    Subjective  Resting comfortably at this time. Pain adequately controlled by PO medications. Denies pruritis, weakness, paresthesias, difficulties breathing or voiding, headache, nausea, or vomiting. She is able to ambulate and tolerates a regular diet.     Objective  Respiratory Function (RR / SpO2 / Airway Patency): Satisfactory  Cardiac Function (HR / Rhythm / BP): Satisfactory  Strength and sensation lower extremities: Normal  Site of spinal/epidural insertion: No signs of infection or inflammation    Most recent vitals  /69   Pulse 73   Temp 36.8  C (98.2  F) (Oral)   Resp 16   Ht 1.702 m (5' 7\")   Wt 91.6 kg (202 lb)   LMP 2022   SpO2 99%   Breastfeeding Unknown   BMI 31.64 kg/m      Assessment and plan  Zara Kaminski is a 33 year old female  POD #1 s/p No admission procedures for hospital encounter. with intrathecal 0.75% bupivacaine (1.5mg), fentanyl (15mcg), and morphine (150mcg) and single shot TAP nerve block injections with bupivacaine 0.25% 10mL and long acting liposome bupivacaine (Exparel) 1.3% bilaterally. She is ambulating without difficulty without weakness or paresthesias. There is no evidence of adverse side effects associated with spinal or fascial plane block injections. The patient is receiving adequate incisional pain control at this time and anticipate up to 72 hours of incisional pain control. However, we further anticipate that the patient may require opioid and non-opioid analgesics for visceral and muscle pain that is not controlled with local anesthetic.      In brief summary, her postoperative analgesia is adequately controlled today. Further interventional " analgesic strategies would be of little utility at this time. Thus, we recommend proceeding with PO analgesics including staggered dosing of NSAIDs and acetaminophen with a taper of oxycodone.     Thank you for including us in the care for this patient. If there are any concerns please contact the department of anesthesia OB division [phone]    Leonel Gregory MD CA-2  Department of Anesthesiology  [phone]

## 2023-09-11 NOTE — PLAN OF CARE
Goal Outcome Evaluation:  Patient is stable but having abdominal discomfort and sharp incisional pain which is managed with her current pain regime like motrin, tylenol and oxycodone. She moves well in the room, voiding spontaneously and passing gas. She breastfeeds with minimal help. Will continue with plan of care.    Plan of Care Reviewed With: patient    Overall Patient Progress: improving

## 2023-09-11 NOTE — LACTATION NOTE
This note was copied from a baby's chart.  Brief Lactation Consult      Visited per call from Zara to help with hand expression. Reviewed techniques and recommended switching between sides every few minutes. Discussed ways to get a deep latch, positioning hand low behind baby's shoulder blades, waiting for wide gape, and bringing baby's chin to the breast first before latching onto the nipple so that nipple is aimed at the roof of baby's mouth. Encouraged to call for support with feedings.        Ellen Pearson RN, [facility]   Lactation Consultant  Ascom: [phone]  Office: [phone]

## 2023-09-11 NOTE — PROGRESS NOTES
[facility]   Post-partum Progress Note    Name:  Zara Kaminski  MRN: [id number]    S:   Patient reports feeling okay overall.  Noticing dull pain all over abdomen and then incisional pain, mainly on left side. About an 8/10 at this time. Last meds at ~0230. Tolerating regular diet without nausea or vomiting.  Ambulating without dizziness. Voiding spontaneously. Has passed flatus as of this morning; has not yet had bowel movement. Lochia similar to menstrual flow. Denies fever/chills, headache, vision changes, chest pain, shortness of breath, RUQ pain, increased edema. Breast feeding.  Desires pills for contraception.    O:   Patient Vitals for the past 24 hrs:   BP Temp Temp src Pulse Resp SpO2   09/11/23 0408 115/69 98.2  F (36.8  C) Oral -- 16 99 %   09/10/23 2351 105/67 97.9  F (36.6  C) Oral -- 16 100 %   09/10/23 2005 106/62 98.2  F (36.8  C) Oral 73 18 100 %   09/10/23 1530 100/51 97.7  F (36.5  C) Oral 81 16 97 %   09/10/23 1416 109/65 -- -- 72 16 99 %   09/10/23 1240 99/58 -- -- 69 -- 99 %   09/10/23 1140 100/61 -- -- 66 16 99 %   09/10/23 1040 107/67 98  F (36.7  C) Oral 67 16 98 %   09/10/23 0940 103/63 -- -- 59 16 98 %   09/10/23 0915 108/65 -- -- 66 -- 99 %   09/10/23 0900 104/64 -- -- 60 16 99 %   09/10/23 0845 108/61 -- -- 58 16 100 %   09/10/23 0830 102/59 97.2  F (36.2  C) Axillary 62 -- 98 %   09/10/23 0815 103/55 -- -- 64 -- 98 %   09/10/23 0800 98/55 -- -- 65 16 97 %   09/10/23 0745 104/61 -- -- 80 16 99 %   09/10/23 0730 99/59 -- -- 78 16 97 %   09/10/23 0715 -- -- -- 90 -- 96 %   09/10/23 0714 101/61 -- -- 79 16 --     Gen:  Resting comfortably, NAD  CV:  RRR  Pulm:  Non-labored breathing. No cough or audible wheezing.   Abd:  Soft, appropriately tender to palpation, non-distended.  Fundus firm, below umbilicus, firm, and non-tender.  Inc:  Bandage in place, c/d/i with minimal overlying shadowing in right corner of bandage within marked lines, no surrounding erythema or  edema  Ext:  Non-tender, 1+ LE edema b/l    Hgb:   Recent Labs   Lab 23  0855   HGB 12.5     A/P:  33 year old  POD#0 s/p PLTCS for arrest of descent. Pregnancy notable for MDD. Continue with routine postpartum management.     #MDD  - PTA lexapro    #Routine Postpartum  #Postpartum hemorrhage   Pain: Well-controlled with scheduled tylenol, toradol > ibuprofen, PRN oxy  Hgb: 12.5 > EBL 1306 (TX) > am hgb pending. VSS as noted above, asymptomatic. Will discharge with PO Fe if hgb < 10  GI:  BID Senna/Colace.  PRN Simethicone.   Gu:  S/p reno, voiding spontaneously   PPx:  Encouraged ambulation   Rh: Positive  Rub:  Immune  Hep B: Surface antigen negative  Baby: In room, doing well  Feed: Breastfeeding  BC: POP  Dispo: Plan for home on POD#2-3.      Justin Dc MD  OB/GYN PGY-2  2023 6:58 AM          Physician Attestation   I personally examined and evaluated this patient.  I discussed the patient with the resident/fellow and care team, and agree with the assessment and plan of care as documented in the note.     Key findings:   Hemoglobin   Date Value Ref Range Status   2023 9.3 (L) 11.7 - 15.7 g/dL Final   2023 12.5 11.7 - 15.7 g/dL Final   04/15/2010 12.9 11.7 - 15.7 g/dL Final     Doing well.   Acute blood loss anemia, asymptomatic. Plan oral iron on discharge.   Will shower and remove bandage today.   Anticipate discharge tomorrow.     Please see A&P for additional details of medical decision making.    I have personally reviewed the following data over the past 24 hrs:    N/A  \   9.3 (L)   / N/A     N/A N/A N/A /  N/A   N/A N/A N/A \         Keysha Roca MD  Date of Service (when I saw the patient): 23

## 2023-09-11 NOTE — LACTATION NOTE
This note was copied from a baby's chart.  Follow Up Consult:   Couplet seen for BF support d/t first time BF, LGA baby    Infant Name: undecided    Infant's Primary Care Clinic: [facility]    Maternal Assessment: Painful nipples bilaterally, reddened with open areas noted on right nipple. Nipples are slightly dimpled in the center bilaterally. Mother is able to HE colostrum.       Infant Assessment:  Baby boy has age appropriate output and weight loss.      Weight Change Since Birth: -4% at 1 day old      Feeding Assessment: Attempting to feed, baby is gagging and burps during oral assessment and while attempting to feed. Sleepy-does not wake to feed despite attempts.  encourages mother to do STS for 15 minutes and watch for feeding cues. If no cues, mother should HE and provide colostrum by cup or spoon.     Oral assessment completed-no concerns.      Education:   - Expected  feeding patterns in the first few days (pg. 38 of Your Guide to To Postpartum and [place] Care)/ the Second Night  - Stages of milk production  - Benefits of hand expression of colostrum  - Early feeding cues     - Benefits of feeding on cue  - Benefits of skin to skin  - Breastfeeding positions  - Tips to get and maintain a deep latch  - How to tell if baby is getting enough  - Expected  output  - Infant Feeding Log  - Signs breastfeeding is going well (comfortable latch, audible swallows, age appropriate output and weight loss)    - Tips to prevent engorgement  - Signs of engorgement  - Tips to manage engorgement  - Inpatient breastfeeding support  - Outpatient lactation resources    Handouts: Infant Feeding Log (Week 1, Your Guide to Postpartum & [place] Care Book)    Plan: Continue to breastfeed on demand, not going longer than 3 hours between feedings. Frequent STS. HE after each feeding and offer colostrum by spoon/cup.       Encouraged follow up with outpatient lactation consultant  as needed after discharge.  Family plans to follow up with [facility].       Halley Castañeda, RN, IBCLC   Lactation Consultant  Ascom: [phone]  Office: [phone]

## 2023-09-11 NOTE — PLAN OF CARE
Goal Outcome Evaluation:  VSS and postpartum assessments WDL.  Up ad carmen with steady gait and independent with cares. Voiding spontaneously.  Bonding well with infant.  Breastfeeding on cue every 1-3 hours (infant has been cluster feeding), supplementing by cup feeding expressed milk.  Pain managed with tylenol, ibuprofen and oxy.  Significant other present and supportive.  Will continue with postpartum cares and education per plan of care.    Plan of Care Reviewed With: patient    Overall Patient Progress: improvingOverall Patient Progress: improving

## 2023-09-11 NOTE — PLAN OF CARE
Goal Outcome Evaluation:         Shift 5136-9694    VSS. Voiding via reno until removal at 2245. Pt educated on plan to void within 4 hours of removal. Taking tylenol and toraldol with oxycodone as needed. Bonding well with infant in room. Patient is breast feeding with nipple shield, pumping followed by hand expressing every 2-3 hours or with baby cues.

## 2023-09-11 NOTE — PROVIDER NOTIFICATION
09/10/23 5934   Provider Notification   Provider Name/Title Alyson DANG   Method of Notification Electronic Page   Request Evaluate-Remote   Notification Reason Other     FYI: Pt shadia is currently in from prior to assessment, I dont see an order to leave it in for extended time. Attempting to take it out but pt is asking to wait until pain is better controlled. Hoping to take it out before 11pm. Thanks.

## 2023-09-12 ENCOUNTER — TELEPHONE (OUTPATIENT)
Dept: OBGYN | Facility: CLINIC | Age: 33
End: 2023-09-12
Payer: COMMERCIAL

## 2023-09-12 VITALS
TEMPERATURE: 97.7 F | RESPIRATION RATE: 16 BRPM | HEART RATE: 68 BPM | SYSTOLIC BLOOD PRESSURE: 116 MMHG | WEIGHT: 202 LBS | DIASTOLIC BLOOD PRESSURE: 74 MMHG | OXYGEN SATURATION: 98 % | HEIGHT: 67 IN | BODY MASS INDEX: 31.71 KG/M2

## 2023-09-12 PROCEDURE — 250N000013 HC RX MED GY IP 250 OP 250 PS 637

## 2023-09-12 PROCEDURE — 250N000013 HC RX MED GY IP 250 OP 250 PS 637: Performed by: OBSTETRICS & GYNECOLOGY

## 2023-09-12 RX ORDER — CYCLOBENZAPRINE HCL 10 MG
10 TABLET ORAL EVERY 8 HOURS PRN
Status: DISCONTINUED | OUTPATIENT
Start: 2023-09-12 | End: 2023-09-12 | Stop reason: HOSPADM

## 2023-09-12 RX ORDER — CYCLOBENZAPRINE HCL 10 MG
10 TABLET ORAL EVERY 8 HOURS PRN
Qty: 12 TABLET | Refills: 0 | Status: SHIPPED | OUTPATIENT
Start: 2023-09-12

## 2023-09-12 RX ORDER — LIDOCAINE 4 G/G
1 PATCH TOPICAL
Status: DISCONTINUED | OUTPATIENT
Start: 2023-09-12 | End: 2023-09-12 | Stop reason: HOSPADM

## 2023-09-12 RX ADMIN — ESCITALOPRAM OXALATE 5 MG: 5 TABLET, FILM COATED ORAL at 08:40

## 2023-09-12 RX ADMIN — SENNOSIDES AND DOCUSATE SODIUM 2 TABLET: 50; 8.6 TABLET ORAL at 08:40

## 2023-09-12 RX ADMIN — CYCLOBENZAPRINE 10 MG: 10 TABLET, FILM COATED ORAL at 08:40

## 2023-09-12 RX ADMIN — ACETAMINOPHEN 975 MG: 325 TABLET, FILM COATED ORAL at 09:41

## 2023-09-12 RX ADMIN — BISACODYL 10 MG: 10 SUPPOSITORY RECTAL at 03:09

## 2023-09-12 RX ADMIN — ACETAMINOPHEN 975 MG: 325 TABLET, FILM COATED ORAL at 03:07

## 2023-09-12 RX ADMIN — IBUPROFEN 800 MG: 800 TABLET ORAL at 09:42

## 2023-09-12 RX ADMIN — OXYCODONE HYDROCHLORIDE 5 MG: 5 TABLET ORAL at 00:23

## 2023-09-12 RX ADMIN — SIMETHICONE 80 MG: 80 TABLET, CHEWABLE ORAL at 06:10

## 2023-09-12 RX ADMIN — OXYCODONE HYDROCHLORIDE 5 MG: 5 TABLET ORAL at 05:58

## 2023-09-12 RX ADMIN — OXYCODONE HYDROCHLORIDE 5 MG: 5 TABLET ORAL at 12:10

## 2023-09-12 RX ADMIN — IBUPROFEN 800 MG: 800 TABLET ORAL at 03:07

## 2023-09-12 ASSESSMENT — ACTIVITIES OF DAILY LIVING (ADL)
ADLS_ACUITY_SCORE: 20

## 2023-09-12 NOTE — LACTATION NOTE
This note was copied from a baby's chart.  Follow Up Consult    Infant Name: Graham    Maternal Assessment: right nipple with crack in the middle      Infant Assessment:  Graham has age appropriate output and weight loss.      Weight Change Since Birth: -4% at 1 day old      Feeding History: Sleepy, but can wake to eat with stimulation.  Needs deeper latch.      Feeding Assessment: Graham woke up and latched well after a few attempts.  Zara needed reminders to keep her fingers back from the areola so Graham can get enough breast tissue in his mouth.  He stayed latched for about 10 minutes before falling asleep.  Encouraged stimulation and changing to 2nd breast and doing some hand expression first to entice Graham to latch.     Education:   - Stages of milk production  - Benefits of hand expression of colostrum  - Early feeding cues     - Benefits of feeding on cue  - Benefits of skin to skin  - Breastfeeding positions  - Tips to get and maintain a deep latch  - Nutritive vs.non-nutritive sucking  - Gentle breast compressions as needed to enhance milk transfer  - [place] weight loss  - [facility] Breastfeeding/Pumping videos  - Signs breastfeeding is going well (comfortable latch, audible swallows, age appropriate output and weight loss)    - Inpatient breastfeeding support    Plan: Continue feeding at least every 3 hours, be vigilant about getting a deep latch, practice hand expression to feed back to Graham.  Use hydrogel to help with healing of right nipple.       Kitty Richter, RN, IBCLC   Lactation Consultant  Ascom: *[phone]  Office: [phone]

## 2023-09-12 NOTE — PLAN OF CARE
Goal Outcome Evaluation:      Plan of Care Reviewed With: patient    Overall Patient Progress: improving     AFVSS. Postpartum assessments WDL. Patient is ambulating and voiding without difficulty. Incision free of s/s infection, open to air with steri strips in place. Fundus firm with scant lochia. Medicated with scheduled ibuprofen, tylenol and intermittently oxycodone for incisional and cramping pain. Declines need for additional pain meds added at this time. She continues to c/o abdominal pain which radiates to shoulder blades. Patient medicated with simethicone, stool softeners and suppository, as well as using warm packs to upper back and abdomen. She states pain getting better. She is passing flatus and has had a BM following suppository overnight. States has been ambulating frequently in room, encouraged to ambulate in halls this am. Patient would like to go home today if pain continues to decrease. Will continue to provide support and education as needed. Continue present cares.

## 2023-09-12 NOTE — LACTATION NOTE
This note was copied from a baby's chart.  Follow Up Consult    Infant Name: Graham    Infant's Primary Care Clinic: [facility]    Maternal Assessment: Breasts soft and symmetrical with everted nipples bilaterally. Right nipple slightly dimpled in center with healing crack.      Infant Assessment:  Graham has age appropriate output and weight loss.      Weight Change Since Birth: -7% at 2 day old      Feeding History: Zara shares that breastfeeding has been going much better and she is able to latch him independently.     Feeding Assessment: Zara brings Graham to breast in cross cradle hold. She sandwiches the breast and with guidance to aim nipple towards nose, bringing lower areola into mouth when he opens with wide gape, and bring him up and over the nipple, she is able to independently latch with deep, comfortable latch. Graham sustains coordinated sucks with audible swallows. Encouraged breast compressions/massage to increase transfer and encourage active feeding.     Education:   - Benefits of hand expression of colostrum  - Early feeding cues     - Benefits of feeding on cue  - Breastfeeding positions  - Tips to get and maintain a deep latch  - Nutritive vs.non-nutritive sucking  - Gentle breast compressions as needed to enhance milk transfer  - How to tell when baby is finished  - How to tell if baby is getting enough  - Expected  output  -  weight loss  - Infant Feeding Log  - Signs breastfeeding is going well (comfortable latch, audible swallows, age appropriate output and weight loss)    - Outpatient lactation resources    Handouts: Infant Feeding Log (Week 1, Your Guide to Postpartum &  Care Book) and [facility] Lactation Resources    Plan: Continue breastfeeding on cue with RN support as needed with a goal of 8-12 feedings per day. Encourage frequent skin to skin, breast massage and hand expression.     Reviewed [facility] Outpatient Lactation Resources  with family. Encouraged follow up with outpatient lactation consultant as needed after discharge. Family plans to follow up with [facility].         Ellen Pearson RN, ARANZA   Lactation Consultant  Ascom: *[phone]  Office: [phone]

## 2023-09-12 NOTE — PROGRESS NOTES
[facility]   Post-partum Progress Note    Name:  Zara Kaminski  MRN: [id number]    S:   Patient reports feeling okay overall. Still having some diffuse abdominal pain with referred pain to her right shoulder. Feels like current bowel regimen is helping. States pain is about 3/10. Tolerating regular diet without nausea or vomiting.  Ambulating without dizziness. Voiding spontaneously. Has passed flatus and had a bowel movement. Lochia similar to menstrual flow. Denies fever/chills, headache, vision changes, chest pain, shortness of breath, RUQ pain, increased edema. Breast feeding.  Desires pills for contraception.    O:   Patient Vitals for the past 24 hrs:   BP Temp Temp src Pulse Resp SpO2   23 0553 116/74 97.7  F (36.5  C) Oral 68 16 --   23 2230 110/73 97.7  F (36.5  C) Oral 62 16 --   23 1815 121/79 98.7  F (37.1  C) Oral 68 16 --   23 1057 113/63 98.1  F (36.7  C) Oral 69 16 98 %     Gen:  Resting comfortably, NAD  CV:  RRR  Pulm:  Non-labored breathing. No cough or audible wheezing.   Abd:  Soft, appropriately tender to palpation, non-distended.  Fundus firm, below umbilicus, firm, and non-tender.  Inc:  Incision c/d/i with no surrounding erythema or edema  Ext:  Non-tender, 1+ LE edema b/l    Hgb:   Recent Labs   Lab 23  0842 23  0855   HGB 9.3* 12.5     A/P:  33 year old  POD#2 s/p PLTCS for arrest of descent. Pregnancy notable for MDD. Continue with routine postpartum management.     # MDD  # ADHD   - PTA lexapro  - Mood stable     # Routine Postpartum  # Postpartum hemorrhage  # Acute Blood Loss Anemia, stable    Pain: Well-controlled with scheduled tylenol, toradol > ibuprofen, PRN oxy  Hgb: 12.5 > EBL 1306 (TX) > 9.3. VSS as noted above, asymptomatic. Will discharge with PO Fe since hgb < 10  GI:  BID Senna/Colace.  PRN Simethicone.   Gu:  S/p reno, voiding spontaneously   PPx:  Encouraged ambulation   Rh: Positive  Rub:  Immune  Hep  B: Surface antigen negative  Baby: In room, doing well  Feed: Breastfeeding  BC: POP  Dispo: Plan for home on POD#2-3.      Lashanda Shields MD, MPH, MS  Obstetrics, Gynecology & Women's Health   Resident, PGY-2  2023 6:46 AM          Physician Attestation   I personally examined and evaluated this patient.  I discussed the patient with the resident/fellow and care team, and agree with the assessment and plan of care as documented in the note.     Key findings: 33 year old  on POD 2 s/p PLTCS for arrest of descent. Overall doing well. Some upper back/shoulder pain secondary to referred pain versus MSK pain. Will add flexeril to see if that helps. Acute blood loss anemia from postpartum hemorrhage, asymptomatic and no s/sx ongoing bleeding. Otherwise meeting discharge goals and interested in going home today. Reviewed discharge instructions including activity restrictions, pelvic rest and follow up plan. Reviewed signs of excessive bleeding, infection, postpartum pre-eclampsia, mastitis, DVT and postpartum depression - instructed patient to call if concerned about any of these or other concerns. Patient to follow up in 2 weeks for mood check, 6 weeks for routine postpartum visit, planning POPs for contraception. All questions answered.      Please see A&P for additional details of medical decision making.    I have personally reviewed the following data over the past 24 hrs:    N/A  \   9.3 (L)   / N/A     N/A N/A N/A /  N/A   N/A N/A N/A \         Gretchen Bruce MD  Date of Service (when I saw the patient): 23

## 2023-09-12 NOTE — PLAN OF CARE
Data: Vital signs stable, postpartum assessments within normal limits.   Eating and drinking without nausea or vomiting.  Up ad carmen, and voiding without difficulty. Passing gas and had BM last night.   Feeding baby independently- breast feeding overall going well. R nipple has crack in the middle, lactation consultant assessed, using hydrogels and hand expressing.   Pain managed with tylenol and ibuprofen and prn oxycodone and flexeril for back muscle spasms. Pt states she is comfortable and pain is at a 5.  Incision appears to be healing well, no s/s infection. Open to air with steri strips.   Discharge outcomes on care plan met.   Action: Review of care plan, teaching, and discharge instructions done.  Response: Patient states understanding and comfort with her discharge instructions and plan of care. All questions addressed. She will discharge home with her spouse and .

## 2023-09-12 NOTE — TELEPHONE ENCOUNTER
M for her to call back to scheduled 6 week pp visit.  6 week = 10/23    Precious Negro RN         ----- Message -----  From: Gretchen Bruce MD  Sent: 9/12/2023   8:14 AM CDT  To: Rd Reception  Subject: postpartum appt                                  Zara Kaminski [id number]  Patient has been discharged and needs the following appointments scheduled:    In person 6 Week Routine Post-partum Visit    Provider appointments should be scheduled with Any Available MD      Please call patient to schedule. Thank you!    Gretchen Bruce MD

## 2023-09-12 NOTE — DISCHARGE INSTRUCTIONS
Warning Signs after Having a Baby    Keep this paper on your fridge or somewhere else where you can see it.    Call your provider if you have any of these symptoms up to 12 weeks after having your baby.    Thoughts of hurting yourself or your baby  Pain in your chest or trouble breathing  Severe headache not helped by pain medicine  Eyesight concerns (blurry vision, seeing spots or flashes of light, other changes to eyesight)  Fainting, shaking or other signs of a seizure    Call 9-1-1 if you feel that it is an emergency.     The symptoms below can happen to anyone after giving birth. They can be very serious. Call your provider if you have any of these warning signs.    My provider s phone number: _______________________    Losing too much blood (hemorrhage)    Call your provider if you soak through a pad in less than an hour or pass blood clots bigger than a golf ball. These may be signs that you are bleeding too much.    Blood clots in the legs or lungs    After you give birth, your body naturally clots its blood to help prevent blood loss. Sometimes this increased clotting can happen in other areas of the body, like the legs or lungs. This can block your blood flow and be very dangerous.     Call your provider if you:  Have a red, swollen spot on the back of your leg that is warm or painful when you touch it.   Are coughing up blood.     Infection    Call your provider if you have any of these symptoms:  Fever of 100.4 F (38 C) or higher.  Pain or redness around your stitches if you had an incision.   Any yellow, white, or green fluid coming from places where you had stitches or surgery.    Mood Problems (postpartum depression)    Many people feel sad or have mood changes after having a baby. But for some people, these mood swings are worse.     Call your provider right away if you feel so anxious or nervous that you can't care for yourself or your baby.    Preeclampsia (high blood pressure)    Even if you  didn't have high blood pressure when you were pregnant, you are at risk for the high blood pressure disease called preeclampsia. This risk can last up to 12 weeks after giving birth.     Call your provider if you have:   Pain on your right side under your rib cage  Sudden swelling in the hands and face    Remember: You know your body. If something doesn't feel right, get medical help.     For informational purposes only. Not to replace the advice of your health care provider. Copyright 2020 [facility]. All rights reserved. Clinically reviewed by Gisela Kaminski, RNC-OB, MSN. [place] [id number] - Rev .    Postop  Birth Instructions    Activity     Do not lift more than 10 pounds for 6 weeks after surgery.  Ask family and friends for help when you need it.  No driving until you have stopped taking your pain medications (usually two weeks after surgery).  No heavy exercise or activity for 6 weeks.  Don't do anything that will put a strain on your surgery site.  Don't strain when using the toilet.  Your care team may prescribe a stool softener if you have problems with your bowel movements.     To care for your incision:     Keep the incision clean and dry.  Do not soak your incision in water. No swimming or hot tubs until it has fully healed. You may soak in the bathtub if the water level is below your incision.  Do not use peroxide, gel, cream, lotion, or ointment on your incision.  Adjust your clothes to avoid pressure on your surgery site (check the elastic in your underwear for example).     You may see a small amount of clear or pink drainage and this is normal.  Check with your health care provider:     If the drainage increases or has an odor.  If the incision reddens, you have swelling, or develop a rash.  If you have increased pain and the medicine we prescribed doesn't help.  If you have a fever above 100.4 F (38 C) with or without chills when placing thermometer under your tongue.    The area around your incision (surgery wound), will feel numb.  This is normal. The numbness should go away in less than a year.     Keep your hands clean:  Always wash your hands before touching your incision (surgery wound). This helps reduce your risk of infection. If your hands aren't dirty, you may use an alcohol hand-rub to clean your hands. Keep your nails clean and short.    Call your healthcare provider if you have any of these symptoms:     You soak a sanitary pad with blood within 1 hour, or you see blood clots larger than a golf ball.  Bleeding that lasts more than 6 weeks.  Vaginal discharge that smells bad.  Severe pain, cramping or tenderness in your lower belly area.  A need to urinate more frequently (use the toilet more often), more urgently (use the toilet very quickly), or it burns when you urinate.  Nausea and vomiting.  Redness, swelling or pain around a vein in your leg.  Problems breastfeeding or a red or painful area on your breast.  Chest pain and cough or are gasping for air.  Problems with coping with sadness, anxiety or depression. If you have concerns about hurting yourself or the baby, call your provider immediately.    You have questions or concerns after you return home.

## 2023-09-13 NOTE — TELEPHONE ENCOUNTER
2nd attempt to reach pt to schedule 6 week PP  10/23 or later with any MD    No answer, LMTCB and mychart message sent to pt.  Elissa Paredes RN on 9/13/2023 at 12:41 PM

## 2023-09-15 ENCOUNTER — IMMUNIZATION (OUTPATIENT)
Dept: PEDIATRICS | Facility: CLINIC | Age: 33
End: 2023-09-15
Payer: COMMERCIAL

## 2023-09-15 PROCEDURE — 90471 IMMUNIZATION ADMIN: CPT

## 2023-09-15 PROCEDURE — 90686 IIV4 VACC NO PRSV 0.5 ML IM: CPT

## 2023-09-15 NOTE — TELEPHONE ENCOUNTER
Third attempt at reaching patient.  LVM for patient to call back and schedule pp visit as soon as possible.  [employer] message that was sent on 9/13 still unread.    Precious Negro RN

## 2023-09-20 ENCOUNTER — TELEPHONE (OUTPATIENT)
Dept: OBGYN | Facility: CLINIC | Age: 33
End: 2023-09-20
Payer: COMMERCIAL

## 2023-09-20 NOTE — TELEPHONE ENCOUNTER
9/10/23  New pain started - stinging sensation  Redness - localized   Small amount of oozing - clearish yellow   Denies fevers/chills  Been taking tylenol and ibuprofen ever 6 hr     Discussed with patient that she can notice more stinging or burning on the incision as it is starting to heal.  Discussed clear drainage can be normal.  Without fevers/chills her symptoms do not sound too concerning.  Patient has incision check scheduled 9/26.  Advised to monitor until that appointment but to call back sooner if symptoms worsen or she develops and fevers/chills. And we can move her appt up.    Precious Negro, RN

## 2023-09-20 NOTE — TELEPHONE ENCOUNTER
[facility] Call Center    Phone Message    May a detailed message be left on voicemail: yes     Reason for Call: Symptoms or Concerns     If patient has red-flag symptoms, warm transfer to triage line    Current symptom or concern: pt is calling, pt had a  on 9/10/23 and her incision has some pain and redness around it, writer attempted to call nurse line and was unable to get through, please call Zara, thank you    Symptoms have been present for: a few days    Has patient previously been seen for this? No      Are there any new or worsening symptoms? Yes: new    Action Taken: Message routed to:  Other: obgyn    Travel Screening: Not Applicable

## 2023-09-22 ENCOUNTER — TELEPHONE (OUTPATIENT)
Dept: OBGYN | Facility: CLINIC | Age: 33
End: 2023-09-22
Payer: COMMERCIAL

## 2023-09-22 NOTE — TELEPHONE ENCOUNTER
C/S 9/10/23    Was lying down for a nap and woke up with the front side of sundress and underwear were wet. Clear to light pink/orange color, no odor, reports that incision looks good, not reddened or warm to the touch. Discussed with patient is is probably serosanguineous fluid. Reviewed signs of infection and when to call back. Advised to clean the site with water/shower. Can remove steristrips if they are saturated and falling off. Advised to keep the area dry.     KINJAL Josue

## 2023-09-26 ENCOUNTER — VIRTUAL VISIT (OUTPATIENT)
Dept: BEHAVIORAL HEALTH | Facility: CLINIC | Age: 33
End: 2023-09-26
Payer: COMMERCIAL

## 2023-09-26 ENCOUNTER — PRENATAL OFFICE VISIT (OUTPATIENT)
Dept: OBGYN | Facility: CLINIC | Age: 33
End: 2023-09-26
Payer: COMMERCIAL

## 2023-09-26 VITALS
WEIGHT: 190.3 LBS | SYSTOLIC BLOOD PRESSURE: 120 MMHG | BODY MASS INDEX: 29.81 KG/M2 | HEART RATE: 83 BPM | DIASTOLIC BLOOD PRESSURE: 83 MMHG | OXYGEN SATURATION: 99 %

## 2023-09-26 DIAGNOSIS — F43.23 ADJUSTMENT DISORDER WITH MIXED ANXIETY AND DEPRESSED MOOD: Primary | ICD-10-CM

## 2023-09-26 DIAGNOSIS — Z91.89 BREASTFEEDING PROBLEM: Primary | ICD-10-CM

## 2023-09-26 PROCEDURE — 99213 OFFICE O/P EST LOW 20 MIN: CPT | Mod: 24

## 2023-09-26 PROCEDURE — 90834 PSYTX W PT 45 MINUTES: CPT | Mod: 93

## 2023-09-26 ASSESSMENT — ANXIETY QUESTIONNAIRES
GAD7 TOTAL SCORE: 2
7. FEELING AFRAID AS IF SOMETHING AWFUL MIGHT HAPPEN: SEVERAL DAYS
IF YOU CHECKED OFF ANY PROBLEMS ON THIS QUESTIONNAIRE, HOW DIFFICULT HAVE THESE PROBLEMS MADE IT FOR YOU TO DO YOUR WORK, TAKE CARE OF THINGS AT HOME, OR GET ALONG WITH OTHER PEOPLE: NOT DIFFICULT AT ALL
GAD7 TOTAL SCORE: 2
5. BEING SO RESTLESS THAT IT IS HARD TO SIT STILL: NOT AT ALL
3. WORRYING TOO MUCH ABOUT DIFFERENT THINGS: NOT AT ALL
6. BECOMING EASILY ANNOYED OR IRRITABLE: SEVERAL DAYS
2. NOT BEING ABLE TO STOP OR CONTROL WORRYING: NOT AT ALL
1. FEELING NERVOUS, ANXIOUS, OR ON EDGE: NOT AT ALL

## 2023-09-26 ASSESSMENT — PATIENT HEALTH QUESTIONNAIRE - PHQ9
SUM OF ALL RESPONSES TO PHQ QUESTIONS 1-9: 5
5. POOR APPETITE OR OVEREATING: NOT AT ALL

## 2023-09-26 NOTE — PROGRESS NOTES
"[facility]  September 26, 2023  Behavioral Health Clinician Progress Note    Patient Name: Zara Kaminski         Service Type:  Individual      Service Location:   Phone call (patient / identified key support person reached)     Session Start Time: 1:03 PM  Session End Time: 1:45 PM      Session Length: 38 - 52      Attendees: Patient     Service Modality:  Phone Visit:      Provider verified identity through the following two step process.  Patient provided:  Patient is known previously to provider    Telephone Visit: The patient's condition can be safely assessed and treated via synchronous audio telemedicine encounter.      Reason for Audio Telemedicine Visit: Patient convenience (e.g. access to timely appointments / distance to available provider)    Originating Site (Patient Location): Patient's home    Distant Site (Provider Location): [facility]    Consent:  The patient/guardian has verbally consented to:     1. The potential risks and benefits of telemedicine (telephone visit) versus in person care;    The patient has been notified of the following:      \"We have found that certain health care needs can be provided without the need for a face to face visit.  This service lets us provide the care you need with a phone conversation.       I will have full access to your [facility] medical record during this entire phone call.   I will be taking notes for your medical record.      Since this is like an office visit, we will bill your insurance company for this service.       There are potential benefits and risks of telephone visits (e.g. limits to patient confidentiality) that differ from in-person visits.?Confidentiality still applies for telephone services, and nobody will record the visit.  It is important to be in a quiet, private space that is free of distractions (including cell phone or other devices) during the visit.??      If during the course of " "the call I believe a telephone visit is not appropriate, you will not be charged for this service\"     Consent has been obtained for this service by care team member: Yes     Visit Activities (Refresh list every visit): [facility] Only and Phone Encounter    Diagnostic Assessment Date: 8/15/23  Treatment Plan Review Date: 8/28/23 - will update at next visit to reflect postpartum changes  See Flowsheets for today's PHQ-9 and MAHAD-7 results  Previous PHQ-9:       7/11/2023    12:47 PM 8/11/2023    11:40 AM 9/26/2023    11:23 AM   PHQ-9 SCORE   PHQ-9 Total Score MyChart 8 (Mild depression)     PHQ-9 Total Score 8 3 5     Previous MAHAD-7:       7/11/2023    12:47 PM 9/26/2023    11:23 AM   MAHAD-7 SCORE   Total Score 6 (mild anxiety)    Total Score 6 2       JACK LEVEL:       No data to display                DATA  Extended Session (60+ minutes): No  Interactive Complexity: No  Crisis: No  [facility] Patient: No    Treatment Objective(s) Addressed in This Session:  Target Behavior(s): adaptive postpartum adjustment    Current Stressors / Issues:  Postpartum mood/anxiety check. Patient is just over two weeks postpartum, shared she is doing well overall though she did note a couple of hard days. Reflected on her birth experience. Denied major mood concerns, denied significant anxiety. Shared she has been reaching out to providers and friends as needed to help navigate challenges.    Interventions: assessed mental health symptoms, validated/normalized patient's emotional experience, explored feelings about delivery experience, reinforced adaptive perspectives and engagement with support system    Progress on Treatment Objective(s) / Homework:  Satisfactory progress - ACTION (Actively working towards change); Intervened by reinforcing change plan / affirming steps taken    Also provided psychoeducation about behavioral health condition, symptoms, and treatment options    Assessments completed prior to visit:  PHQ9:       7/11/2023    12:47 PM " 8/11/2023    11:40 AM 9/26/2023    11:23 AM   PHQ-9 SCORE   PHQ-9 Total Score MyChart 8 (Mild depression)     PHQ-9 Total Score 8 3 5     GAD7:       7/11/2023    12:47 PM 9/26/2023    11:23 AM   MAHAD-7 SCORE   Total Score 6 (mild anxiety)    Total Score 6 2     PROMIS 10-Global Health (all questions and answers displayed):       7/11/2023    12:58 PM   PROMIS 10   In general, would you say your health is: Good   In general, would you say your quality of life is: Very good   In general, how would you rate your physical health? Good   In general, how would you rate your mental health, including your mood and your ability to think? Fair   In general, how would you rate your satisfaction with your social activities and relationships? Fair   In general, please rate how well you carry out your usual social activities and roles Fair   To what extent are you able to carry out your everyday physical activities such as walking, climbing stairs, carrying groceries, or moving a chair? Mostly   In the past 7 days, how often have you been bothered by emotional problems such as feeling anxious, depressed, or irritable? Often   In the past 7 days, how would you rate your fatigue on average? Moderate   In the past 7 days, how would you rate your pain on average, where 0 means no pain, and 10 means worst imaginable pain? 3   In general, would you say your health is: 3   In general, would you say your quality of life is: 4   In general, how would you rate your physical health? 3   In general, how would you rate your mental health, including your mood and your ability to think? 2   In general, how would you rate your satisfaction with your social activities and relationships? 2   In general, please rate how well you carry out your usual social activities and roles. (This includes activities at home, at work and in your community, and responsibilities as a parent, child, spouse, employee, friend, etc.) 2   To what extent are you able to  carry out your everyday physical activities such as walking, climbing stairs, carrying groceries, or moving a chair? 4   In the past 7 days, how often have you been bothered by emotional problems such as feeling anxious, depressed, or irritable? 4   In the past 7 days, how would you rate your fatigue on average? 3   In the past 7 days, how would you rate your pain on average, where 0 means no pain, and 10 means worst imaginable pain? 3   Global Mental Health Score 10   Global Physical Health Score 14   PROMIS TOTAL - SUBSCORES 24     Care Plan review completed: No    Medication Review:  No changes to current psychiatric medication(s)    Medication Compliance:  Yes    Changes in Health Issues:  Two weeks postpartum  C/s on 9/10    Chemical Use Review:   Substance Use: Chemical use reviewed, no active concerns identified      Tobacco Use: No current tobacco use.      Assessment: Current Emotional / Mental Status (status of significant symptoms):  Risk status (Self / Other harm or suicidal ideation)  Patient denies a history of suicidal ideation, suicide attempts, self-injurious behavior, homicidal ideation, homicidal behavior, and and other safety concerns  Patient denies current fears or concerns for personal safety.  Patient denies current or recent suicidal ideation or behaviors.  Patient denies current or recent homicidal ideation or behaviors.  Patient denies current or recent self injurious behavior or ideation.  Patient denies other safety concerns.  A safety and risk management plan has not been developed at this time, however patient was encouraged to call [facility] should there be a change in any of these risk factors.    Appearance:   Unable to assess   Eye Contact:   Unable to assess   Psychomotor Behavior: Unable to assess   Attitude:   Cooperative  Interested Pleasant  Orientation:   All  Speech   Rate / Production: Normal/ Responsive   Volume:  Normal   Mood:    Overall stable/euthymic mood,  intermittent dysphoria  Affect:    Appropriate   Thought Content:  Clear   Thought Form:  Coherent  Goal Directed  Logical   Insight:    Good     Diagnoses:  1. Adjustment disorder with mixed anxiety and depressed mood      Collateral Reports Completed:  Not Applicable    Plan: (Homework, other):  Follow-up scheduled in one month for postpartum mood/anxiety check. [facility] shared information about new mom groups via [employer]. CD Recommendations: No indications of CD issues.     Montserrat Parrish, LGSW    ______________________________________________________________________    Integrated Primary Care Behavioral Health Treatment Plan    Patient's Name: Zara Kaminski  YOB: 1990    Date of Creation: 8/28/23  Date Treatment Plan Last Reviewed/Revised: n/a    DSM5 Diagnoses: Adjustment Disorders  309.28 (F43.23) With mixed anxiety and depressed mood  Psychosocial / Contextual Factors: patient's cousin was murdered in May 2023, trial begins this week; currently expecting first child due in September   PROMIS (reviewed every 90 days): 24    Referral / Collaboration:  Referral for couples therapy in place    Anticipated number of session for this episode of care: 6  Anticipation frequency of session:  Every 2-4 weeks  Anticipated Duration of each session: 16-37 minutes  Treatment plan will be reviewed in 90 days or when goals have been changed.     MeasurableTreatment Goal(s) related to diagnosis / functional impairment(s)  Goal 1: Patient will reduce distress regarding plans for delivery, evidenced by patient report.     Objective #A (Patient Action)    Patient will identify factors contributing to feelings of depression and anxiety.  Status: New - Date: 8/28/23      Intervention(s)  Therapist will guide reflection on external events and cognitions related to stressors.    Objective #B  Patient will engage in adaptive coping strategies.   Status: New - Date: 8/28/23      Intervention(s)  Therapist will teach  cognitive and behavioral skills to manage mental health symptoms.     Goal 2: Patient will process loss in a manner that promotes normative grief reaction.    Objective #A (Patient Action)    Patient will discuss evolution of grief reaction in session, at a pace that feels comfortable for her.   Status: New - Date: 8/28/23     Intervention(s)  Therapist will support patient in reflecting on emotions within grief experience in session.     Patient has reviewed and agreed to the above plan.      WESLEY Burton  August 28, 2023

## 2023-09-26 NOTE — PROGRESS NOTES
CC: breast concern, incision check  S: Zara is a 32 yo  who delivered 9/10/23 via LTCS at 40.3wks. She is here today with her patner Ej and baby son. She is feeling well. She has been having some incision drainage, small to moderate, clear to orange, tan, no redness, pain, swelling or fever. She is also having some difficulties with breastfeeding. She noted that she has had nipple damage and pain. She is seeing a provider at [facility] for lactation assistance. She was started on a mupirocin ointment to her nipples for the damage. She recently started with nipple shields as well. The provider saw her yesterday and wanted OBGYN to evaluate her for concerns. She denies fever, malaise or any systemic sx. She is having some night sweats. She denies breast pain, but is experiencing a lot of nipple pain. Otherwise vaginal bleeding is slowing. Pain well controlled. Baby is starting to gain weight.    O:/83   Pulse 83   Wt 86.3 kg (190 lb 4.8 oz)   LMP 2022   SpO2 99%   Breastfeeding Yes   BMI 29.81 kg/m    Past Medical History:   Diagnosis Date    ADHD (attention deficit hyperactivity disorder)     Trauma and stressor-related disorder 2016     Past Surgical History:   Procedure Laterality Date     SECTION N/A 9/10/2023    Procedure:  section;  Surgeon: Gretchen Hernandez MD;  Location: [facility] L[facility]    WISDOM TOOTH EXTRACTION        No Known Allergies  Current Outpatient Medications   Medication    acetaminophen (TYLENOL) 325 MG tablet    escitalopram (LEXAPRO) 5 MG tablet    ibuprofen (ADVIL/MOTRIN) 600 MG tablet    Misc. Devices (BREAST PUMP) MISC    mupirocin (BACTROBAN) 2 % external ointment    Prenatal Vit-Fe Fumarate-FA (PRENATAL MULTIVITAMIN W/IRON) 27-0.8 MG tablet    senna-docusate (SENOKOT-S/PERICOLACE) 8.6-50 MG tablet    cyclobenzaprine (FLEXERIL) 10 MG tablet    ferrous sulfate (FEROSUL) 325 (65 Fe) MG tablet    folic acid-vit B6-vit B12 (FOLGARD) 0.8-10-0.115 MG TABS per  tablet    norethindrone (MICRONOR) 0.35 MG tablet     No current facility-administered medications for this visit.         2023    12:47 PM 2023    11:40 AM 2023    11:23 AM   PHQ   PHQ-9 Total Score 8 3 5   Q9: Thoughts of better off dead/self-harm past 2 weeks Not at all Not at all Not at all         2023    12:47 PM 2023    11:23 AM   MAHAD-7 SCORE   Total Score 6 (mild anxiety)    Total Score 6 2         Alert pleasant well appearing female NAD  Incision CDI well appx, roseanna, steri strips intact. Scant amounts of seorus, to serosanguinous fluid visualized on underwear  Edema 1+ BLE  Right breast area of erythema to the 4-5 o'clock position, noted palpable marble size nonfixed mildly tender mass behind area of erythema. No other masses palpated. Nipple with visible damage  Left breast visually normal, no masses felt, no erythema, Nipple with visible damage     A:Zara is a 34 yo  who delivered 9/10/23 via LTCS at 40.3wks. She is here today for a breast concern and incision check.  P:  -incision CDI well appx, normal serous to serous sanguinous fluid draining, recc removing steri strips after next shower, continue to keep dry after showers, wash hands before touching, seek evaluation if redness, swelling, opening, draining pus or foul smelling fluid, pain or bleeding  -right breast appearance concerning for plugged duct progressing towards mastitis. Recommended NSAID, cool compress, gentle lymphatic massage, pumping/feeding/dangle feeds/ hand expression. Presence of erythema, and minor warmth concerning for progression to mastitis, however pt denies malaise, fever, chills, or systemic sx. Warning signs if progression of erythema, pain, swelling, fever, malaise, chills, or no resolution with supportive measures call clinic right away for abx. If resolution of clogged duct, continue to monitor sx.  -recommend silverettes for nipple damage  -recc follow up with IBCLC for feeding  difficulties  -will forward information to RNS if pt calls low threshold to start abx for mastitis.  RTC as scheduled or PRN with needs.  JASPREET Oviedo CNP

## 2023-10-01 ENCOUNTER — NURSE TRIAGE (OUTPATIENT)
Dept: NURSING | Facility: CLINIC | Age: 33
End: 2023-10-01
Payer: COMMERCIAL

## 2023-10-01 ENCOUNTER — MYC MEDICAL ADVICE (OUTPATIENT)
Dept: OBGYN | Facility: CLINIC | Age: 33
End: 2023-10-01
Payer: COMMERCIAL

## 2023-10-01 DIAGNOSIS — O91.219: Primary | ICD-10-CM

## 2023-10-01 RX ORDER — DICLOXACILLIN SODIUM 500 MG
500 CAPSULE ORAL 4 TIMES DAILY
Qty: 28 CAPSULE | Refills: 0 | Status: SHIPPED | OUTPATIENT
Start: 2023-10-01 | End: 2023-10-08

## 2023-10-01 NOTE — TELEPHONE ENCOUNTER
Patient calling, she thinks she may have mastitis in her right breast on the medial aspect. Last night, it became really full and tender and hot. She's had chills overnight, and achiness. She has not checked a temperature. It's bright red and approximately 15 mm in diameter. She has been taking ibuprofen and ac    MD consult, Dr. INEZ Hilton Paged by RN at 0740  Reason for page: probable mastitis    Provider, Dr. Hilton, returning page to [facility] at 7:44 AM  Provider Recommendations/Plan:  She is not on call for this provider group     MD consult, Dr. JAMES Mcghee Paged by RN at 0751 am  Reason for page: probable mastitis.    Provider, Dr. Mcghee, returning page to [facility] at 7:54 AM  Provider Recommendations/Plan:  Dr. Mcghee wrote a prescription for antibiotics for the patient while on the phone.     Patient informed of the above. Prescription sent to [facility] per patient request.       Reason for Disposition   [1] Breast symptoms (e.g., lump, pain, redness, swelling) AND [2] mother plans to continue breastfeeding or pumping   [1] Breast looks infected (spreading redness, feels hot to touch) AND [2] large red area (> 2 in. or 5 cm)    Additional Information   Negative: Sounds like a life-threatening emergency to the triager   Negative: Breastfeeding questions about mother's medicines and diet   Negative: Breastfeeding questions about baby   Negative: [1] Mom has sore nipples AND [2] baby has untreated symptoms of thrush   Negative: [1] Breastfeeding AND [2] has feeding concerns about the baby   Negative: [1] Breastfeeding AND [2] has questions about maternal medicines, other drugs or diet   Negative: Postpartum depression is the main concern   Negative: [place] breast symptoms   Negative: Weaning from breastfeeding, questions about   Negative: [1] SEVERE breast pain AND [2] fever > 103 F (39.4 C)   Negative: Mother sounds very sick or weak to the triager    Protocols used: Postpartum -  Breast Pain and [id number], Breastfeeding - Mother's Breast Symptoms or [id number]

## 2023-10-01 NOTE — TELEPHONE ENCOUNTER
Already called. Has mastitis and RX sent to a [facility] near her house. They're not finding the prescription. She would like the prescription called into the pharmacy so she can get it (even though it says it was received at 7:59 a.m. today). I spoke with the pharmacist who said they do have the order and that he will put it into their system.  Kisha Giraldo RN  [facility]    Reason for Disposition   Caller has medicine question, adult has minor symptoms, caller declines triage, AND triager answers question    Additional Information   Negative: [1] Intentional drug overdose AND [2] suicidal thoughts or ideas   Negative: Drug overdose and triager unable to answer question   Negative: Caller requesting a renewal or refill of a medicine patient is currently taking   Negative: Caller requesting information unrelated to medicine   Negative: Caller requesting information about COVID-19 Vaccine   Negative: Caller requesting information about Emergency Contraception   Negative: Caller requesting information about Combined Birth Control Pills   Negative: Caller requesting information about Progestin Birth Control Pills   Negative: Caller requesting information about Post-Op pain or medicines   Negative: Caller requesting a prescription antibiotic (such as Penicillin) for Strep throat and has a positive culture result   Negative: Caller requesting a prescription anti-viral med (such as Tamiflu) and has influenza (flu) symptoms   Negative: Immunization reaction suspected   Negative: Rash while taking a medicine or within 3 days of stopping it   Negative: [1] Asthma and [2] having symptoms of asthma (cough, wheezing, etc.)   Negative: [1] Symptom of illness (e.g., headache, abdominal pain, earache, vomiting) AND [2] more than mild   Negative: Breastfeeding questions about mother's medicines and diet   Negative: MORE THAN A DOUBLE DOSE of a prescription or over-the-counter (OTC) drug   Negative: [1] DOUBLE DOSE  (an extra dose or lesser amount) of prescription drug AND [2] any symptoms (e.g., dizziness, nausea, pain, sleepiness)   Negative: [1] DOUBLE DOSE (an extra dose or lesser amount) of over-the-counter (OTC) drug AND [2] any symptoms (e.g., dizziness, nausea, pain, sleepiness)   Negative: Took another person's prescription drug   Negative: [1] DOUBLE DOSE (an extra dose or lesser amount) of prescription drug AND [2] NO symptoms  (Exception: A double dose of antibiotics.)   Negative: Diabetes drug error or overdose (e.g., took wrong type of insulin or took extra dose)   Negative: [1] Prescription not at pharmacy AND [2] was prescribed by PCP recently (Exception: Triager has access to EMR and prescription is recorded there. Go to Home Care and confirm for pharmacy.)   Negative: [1] Pharmacy calling with prescription question AND [2] triager unable to answer question   Negative: [1] Caller has URGENT medicine question about med that PCP or specialist prescribed AND [2] triager unable to answer question   Negative: Medicine patch causing local rash or itching   Negative: [1] Caller has medicine question about med NOT prescribed by PCP AND [2] triager unable to answer question (e.g., compatibility with other med, storage)   Negative: Prescription request for new medicine (not a refill)   Negative: [1] Caller has NON-URGENT medicine question about med that PCP prescribed AND [2] triager unable to answer question   Negative: Caller wants to use a complementary or alternative medicine   Negative: [1] Prescription prescribed recently is not at pharmacy AND [2] triager has access to patient's EMR AND [3] prescription is recorded in the EMR   Negative: [1] DOUBLE DOSE (an extra dose or lesser amount) of over-the-counter (OTC) drug AND [2] NO symptoms   Negative: [1] DOUBLE DOSE (an extra dose or lesser amount) of antibiotic drug AND [2] NO symptoms   Negative: Caller has medicine question only, adult not sick, AND triager  answers question    Protocols used: Medication Question Call-A-AH

## 2023-10-09 ENCOUNTER — MEDICAL CORRESPONDENCE (OUTPATIENT)
Dept: HEALTH INFORMATION MANAGEMENT | Facility: CLINIC | Age: 33
End: 2023-10-09
Payer: COMMERCIAL

## 2023-10-10 ENCOUNTER — MYC MEDICAL ADVICE (OUTPATIENT)
Dept: OBGYN | Facility: CLINIC | Age: 33
End: 2023-10-10
Payer: COMMERCIAL

## 2023-10-10 DIAGNOSIS — N61.0 MASTITIS: Primary | ICD-10-CM

## 2023-10-10 NOTE — TELEPHONE ENCOUNTER
"Office visit 9/26  \"-right breast appearance concerning for plugged duct progressing towards mastitis. Recommended NSAID, cool compress, gentle lymphatic massage, pumping/feeding/dangle feeds/ hand expression. Presence of erythema, and minor warmth concerning for progression to mastitis, however pt denies malaise, fever, chills, or systemic sx. Warning signs if progression of erythema, pain, swelling, fever, malaise, chills, or no resolution with supportive measures call clinic right away for abx. If resolution of clogged duct, continue to monitor sx. \"    Triage call to MD on 10/1  Dicloxacillin prescribed     Message from patient today 10/10 \"I finished the antibiotics yesterday, and am feeling much better but I still have a large hard area on the upper portion of my right breast (about 2  in diameter) and my milk production is still less than half of what it was before mastitis. I just wanted to see if this was normal, or if I should set up an appointment to be seen in person?\"    Routing to provider to advise. Continue comfort measures or be seen in clinic?    KINJAL Josue  "

## 2023-10-11 ENCOUNTER — ANCILLARY PROCEDURE (OUTPATIENT)
Dept: MAMMOGRAPHY | Facility: CLINIC | Age: 33
End: 2023-10-11
Attending: OBSTETRICS & GYNECOLOGY
Payer: COMMERCIAL

## 2023-10-11 ENCOUNTER — TELEPHONE (OUTPATIENT)
Dept: OBGYN | Facility: CLINIC | Age: 33
End: 2023-10-11

## 2023-10-11 DIAGNOSIS — N61.0 MASTITIS: ICD-10-CM

## 2023-10-11 DIAGNOSIS — N61.0 MASTITIS: Primary | ICD-10-CM

## 2023-10-11 LAB
GRAM STAIN RESULT: ABNORMAL
GRAM STAIN RESULT: ABNORMAL

## 2023-10-11 PROCEDURE — 99000 SPECIMEN HANDLING OFFICE-LAB: CPT | Performed by: PATHOLOGY

## 2023-10-11 PROCEDURE — 87205 SMEAR GRAM STAIN: CPT | Performed by: OBSTETRICS & GYNECOLOGY

## 2023-10-11 PROCEDURE — 76642 ULTRASOUND BREAST LIMITED: CPT | Mod: RT | Performed by: RADIOLOGY

## 2023-10-11 PROCEDURE — G0279 TOMOSYNTHESIS, MAMMO: HCPCS | Performed by: RADIOLOGY

## 2023-10-11 PROCEDURE — 10160 PNXR ASPIR ABSC HMTMA BULLA: CPT | Performed by: RADIOLOGY

## 2023-10-11 PROCEDURE — 76942 ECHO GUIDE FOR BIOPSY: CPT | Mod: XU | Performed by: RADIOLOGY

## 2023-10-11 PROCEDURE — 87075 CULTR BACTERIA EXCEPT BLOOD: CPT | Performed by: OBSTETRICS & GYNECOLOGY

## 2023-10-11 PROCEDURE — 87070 CULTURE OTHR SPECIMN AEROBIC: CPT | Performed by: OBSTETRICS & GYNECOLOGY

## 2023-10-11 PROCEDURE — 77066 DX MAMMO INCL CAD BI: CPT | Performed by: RADIOLOGY

## 2023-10-11 RX ORDER — CEPHALEXIN 500 MG/1
500 CAPSULE ORAL 4 TIMES DAILY
Qty: 28 CAPSULE | Refills: 0 | Status: ON HOLD | OUTPATIENT
Start: 2023-10-11 | End: 2023-10-21

## 2023-10-11 NOTE — TELEPHONE ENCOUNTER
Provider from breast center calling regarding patient's appt.    Patient had a breast abscess and was sent to the breast center for appt - was started on dicloxacillin.    Patient completed antibiotics on Monday but is still symptomatic.    Per breast center the abscess did improve on dicloxacillin but did not fully go away - they are recommended to be put back on antibiotics as this time.    Provider preference.    Has follow up schedule with breast center next Tuesday 10/17.    They did drain a small amount of fluid which was sent for culture/sensitivities.    Please review and advise then sent back to triage - we will touch base with patient regarding plan.    Precious Negro RN

## 2023-10-11 NOTE — TELEPHONE ENCOUNTER
If she has a lump and redness that did not improve with antibiotics, she needs an urgent visit with the breast center for imaging to rule out abscess.  Order is placed, can you help facilitate that visit for her?  Thanks    GREG MONDRAGON MD

## 2023-10-11 NOTE — TELEPHONE ENCOUNTER
Called the breast center and got her scheduled for today at 11:30am    Called and spoke to Zara. Relayed Dr Mcghee's MyChart message to her as she had not seen it yet and [facility] [address]. 11:30am works for her so she will keep that time.     KINJAL Josue

## 2023-10-11 NOTE — TELEPHONE ENCOUNTER
Called patient and notified of new Rx and to call with worsening symptoms.     Precious Negro RN

## 2023-10-11 NOTE — TELEPHONE ENCOUNTER
Rx for keflex faxed for her.  Glad she has f/u with breast center, but she should let us/them know if the symptoms are worsening before then.  Thanks    GREG MONDRAGON MD

## 2023-10-14 LAB — BACTERIA ABSC ANAEROBE+AEROBE CULT: ABNORMAL

## 2023-10-16 ENCOUNTER — ANCILLARY PROCEDURE (OUTPATIENT)
Dept: MAMMOGRAPHY | Facility: CLINIC | Age: 33
End: 2023-10-16
Attending: OBSTETRICS & GYNECOLOGY
Payer: COMMERCIAL

## 2023-10-16 ENCOUNTER — HOSPITAL ENCOUNTER (INPATIENT)
Facility: CLINIC | Age: 33
LOS: 5 days | Discharge: HOME | End: 2023-10-21
Attending: OBSTETRICS & GYNECOLOGY | Admitting: OBSTETRICS & GYNECOLOGY
Payer: COMMERCIAL

## 2023-10-16 ENCOUNTER — TELEPHONE (OUTPATIENT)
Dept: OBGYN | Facility: CLINIC | Age: 33
End: 2023-10-16

## 2023-10-16 DIAGNOSIS — N61.0 MASTITIS: ICD-10-CM

## 2023-10-16 DIAGNOSIS — Z34.03 ENCOUNTER FOR SUPERVISION OF NORMAL FIRST PREGNANCY IN THIRD TRIMESTER: ICD-10-CM

## 2023-10-16 DIAGNOSIS — N61.1 BREAST ABSCESS: Primary | ICD-10-CM

## 2023-10-16 LAB
CREAT SERPL-MCNC: 0.82 MG/DL (ref 0.51–0.95)
CRP SERPL-MCNC: 11.1 MG/L
EGFRCR SERPLBLD CKD-EPI 2021: >90 ML/MIN/1.73M2
ERYTHROCYTE [DISTWIDTH] IN BLOOD BY AUTOMATED COUNT: 13.7 % (ref 10–15)
HCT VFR BLD AUTO: 34.7 % (ref 35–47)
HGB BLD-MCNC: 10.9 G/DL (ref 11.7–15.7)
MCH RBC QN AUTO: 27.9 PG (ref 26.5–33)
MCHC RBC AUTO-ENTMCNC: 31.4 G/DL (ref 31.5–36.5)
MCV RBC AUTO: 89 FL (ref 78–100)
PLATELET # BLD AUTO: 352 10E3/UL (ref 150–450)
RBC # BLD AUTO: 3.91 10E6/UL (ref 3.8–5.2)
WBC # BLD AUTO: 9 10E3/UL (ref 4–11)

## 2023-10-16 PROCEDURE — 120N000002 HC R&B MED SURG/OB UMMC

## 2023-10-16 PROCEDURE — 76642 ULTRASOUND BREAST LIMITED: CPT | Mod: RT | Performed by: RADIOLOGY

## 2023-10-16 PROCEDURE — 250N000011 HC RX IP 250 OP 636: Mod: JZ

## 2023-10-16 PROCEDURE — 76942 ECHO GUIDE FOR BIOPSY: CPT | Mod: XE | Performed by: RADIOLOGY

## 2023-10-16 PROCEDURE — 258N000003 HC RX IP 258 OP 636: Performed by: OBSTETRICS & GYNECOLOGY

## 2023-10-16 PROCEDURE — 85027 COMPLETE CBC AUTOMATED: CPT

## 2023-10-16 PROCEDURE — 250N000013 HC RX MED GY IP 250 OP 250 PS 637

## 2023-10-16 PROCEDURE — 86140 C-REACTIVE PROTEIN: CPT

## 2023-10-16 PROCEDURE — 10160 PNXR ASPIR ABSC HMTMA BULLA: CPT | Mod: GC | Performed by: RADIOLOGY

## 2023-10-16 PROCEDURE — 36415 COLL VENOUS BLD VENIPUNCTURE: CPT

## 2023-10-16 PROCEDURE — 82565 ASSAY OF CREATININE: CPT | Performed by: OBSTETRICS & GYNECOLOGY

## 2023-10-16 PROCEDURE — 250N000011 HC RX IP 250 OP 636: Performed by: OBSTETRICS & GYNECOLOGY

## 2023-10-16 PROCEDURE — 99222 1ST HOSP IP/OBS MODERATE 55: CPT | Mod: 24 | Performed by: OBSTETRICS & GYNECOLOGY

## 2023-10-16 RX ORDER — AMOXICILLIN 250 MG
2 CAPSULE ORAL 2 TIMES DAILY PRN
Status: DISCONTINUED | OUTPATIENT
Start: 2023-10-16 | End: 2023-10-21 | Stop reason: HOSPADM

## 2023-10-16 RX ORDER — ACETAMINOPHEN 650 MG/1
650 SUPPOSITORY RECTAL EVERY 4 HOURS PRN
Status: DISCONTINUED | OUTPATIENT
Start: 2023-10-16 | End: 2023-10-21 | Stop reason: HOSPADM

## 2023-10-16 RX ORDER — CEFTRIAXONE 1 G/1
1 INJECTION, POWDER, FOR SOLUTION INTRAMUSCULAR; INTRAVENOUS EVERY 24 HOURS
Status: DISCONTINUED | OUTPATIENT
Start: 2023-10-16 | End: 2023-10-16

## 2023-10-16 RX ORDER — AMOXICILLIN 250 MG
1 CAPSULE ORAL 2 TIMES DAILY PRN
Status: DISCONTINUED | OUTPATIENT
Start: 2023-10-16 | End: 2023-10-21 | Stop reason: HOSPADM

## 2023-10-16 RX ORDER — VANCOMYCIN HYDROCHLORIDE 1 G/200ML
1000 INJECTION, SOLUTION INTRAVENOUS EVERY 12 HOURS
Status: DISCONTINUED | OUTPATIENT
Start: 2023-10-17 | End: 2023-10-18

## 2023-10-16 RX ORDER — CEFTRIAXONE 2 G/1
2 INJECTION, POWDER, FOR SOLUTION INTRAMUSCULAR; INTRAVENOUS EVERY 24 HOURS
Status: DISCONTINUED | OUTPATIENT
Start: 2023-10-16 | End: 2023-10-18

## 2023-10-16 RX ORDER — ACETAMINOPHEN 325 MG/1
650 TABLET ORAL EVERY 6 HOURS PRN
Status: DISCONTINUED | OUTPATIENT
Start: 2023-10-16 | End: 2023-10-21 | Stop reason: HOSPADM

## 2023-10-16 RX ADMIN — VANCOMYCIN HYDROCHLORIDE 1250 MG: 10 INJECTION, POWDER, LYOPHILIZED, FOR SOLUTION INTRAVENOUS at 22:27

## 2023-10-16 RX ADMIN — ACETAMINOPHEN 650 MG: 325 TABLET, FILM COATED ORAL at 22:47

## 2023-10-16 RX ADMIN — IBUPROFEN 600 MG: 200 TABLET, FILM COATED ORAL at 22:47

## 2023-10-16 RX ADMIN — CEFTRIAXONE SODIUM 2 G: 2 INJECTION, POWDER, FOR SOLUTION INTRAMUSCULAR; INTRAVENOUS at 21:10

## 2023-10-16 RX ADMIN — SENNOSIDES AND DOCUSATE SODIUM 1 TABLET: 50; 8.6 TABLET ORAL at 22:47

## 2023-10-16 ASSESSMENT — ACTIVITIES OF DAILY LIVING (ADL)
ADLS_ACUITY_SCORE: 35

## 2023-10-16 NOTE — TELEPHONE ENCOUNTER
Call back from Dr. Rcoa, will need IV abx, will call pt/try to get admitted inpatient for management.  Elissa Paredes RN on 10/16/2023 at 4:14 PM

## 2023-10-16 NOTE — TELEPHONE ENCOUNTER
Delivered 9/10    Last week (10/11) had breast ultrasound and had fluid removed from abscess in R breast, cultured, changed abx-currently on keflex with 2 days left  Pt saw note from Dr. Roca that sensitivities noted that keflex should be sufficient for coverage    Pt called clinic to let us know that over the weekend, developed a lot more redness and abscess area more swollen and extending more towards nipple, worsening pain   Denies fevers, chills, body aches but has been alternating tylenol and ibuprofen  Got in at breast center today--team there tried to drain more but unable to get anything out, no new cultures    Pt unsure if abx needs to be changed or extended since symptoms worsening not improving  Routing to provider to advise.  Elissa Paredes, RN on 10/16/2023 at 3:07 PM

## 2023-10-17 LAB
CRP SERPL-MCNC: 9.31 MG/L
ERYTHROCYTE [DISTWIDTH] IN BLOOD BY AUTOMATED COUNT: 13.7 % (ref 10–15)
HCT VFR BLD AUTO: 32.2 % (ref 35–47)
HGB BLD-MCNC: 10.4 G/DL (ref 11.7–15.7)
MCH RBC QN AUTO: 28.9 PG (ref 26.5–33)
MCHC RBC AUTO-ENTMCNC: 32.3 G/DL (ref 31.5–36.5)
MCV RBC AUTO: 89 FL (ref 78–100)
PLATELET # BLD AUTO: 305 10E3/UL (ref 150–450)
RBC # BLD AUTO: 3.6 10E6/UL (ref 3.8–5.2)
WBC # BLD AUTO: 6.9 10E3/UL (ref 4–11)

## 2023-10-17 PROCEDURE — 250N000011 HC RX IP 250 OP 636: Mod: JZ | Performed by: OBSTETRICS & GYNECOLOGY

## 2023-10-17 PROCEDURE — 258N000003 HC RX IP 258 OP 636

## 2023-10-17 PROCEDURE — 86140 C-REACTIVE PROTEIN: CPT

## 2023-10-17 PROCEDURE — 99231 SBSQ HOSP IP/OBS SF/LOW 25: CPT | Mod: 24 | Performed by: OBSTETRICS & GYNECOLOGY

## 2023-10-17 PROCEDURE — 36415 COLL VENOUS BLD VENIPUNCTURE: CPT

## 2023-10-17 PROCEDURE — 250N000013 HC RX MED GY IP 250 OP 250 PS 637

## 2023-10-17 PROCEDURE — 120N000002 HC R&B MED SURG/OB UMMC

## 2023-10-17 PROCEDURE — 250N000011 HC RX IP 250 OP 636: Mod: JZ

## 2023-10-17 PROCEDURE — 85027 COMPLETE CBC AUTOMATED: CPT

## 2023-10-17 RX ORDER — SODIUM CHLORIDE, SODIUM LACTATE, POTASSIUM CHLORIDE, CALCIUM CHLORIDE 600; 310; 30; 20 MG/100ML; MG/100ML; MG/100ML; MG/100ML
INJECTION, SOLUTION INTRAVENOUS
Status: COMPLETED
Start: 2023-10-17 | End: 2023-10-17

## 2023-10-17 RX ADMIN — IBUPROFEN 600 MG: 200 TABLET, FILM COATED ORAL at 12:16

## 2023-10-17 RX ADMIN — ACETAMINOPHEN 650 MG: 325 TABLET, FILM COATED ORAL at 06:32

## 2023-10-17 RX ADMIN — CEFTRIAXONE SODIUM 2 G: 2 INJECTION, POWDER, FOR SOLUTION INTRAMUSCULAR; INTRAVENOUS at 21:03

## 2023-10-17 RX ADMIN — SODIUM CHLORIDE, POTASSIUM CHLORIDE, SODIUM LACTATE AND CALCIUM CHLORIDE 1000 ML: 600; 310; 30; 20 INJECTION, SOLUTION INTRAVENOUS at 10:53

## 2023-10-17 RX ADMIN — ACETAMINOPHEN 650 MG: 325 TABLET, FILM COATED ORAL at 18:41

## 2023-10-17 RX ADMIN — ACETAMINOPHEN 650 MG: 325 TABLET, FILM COATED ORAL at 12:16

## 2023-10-17 RX ADMIN — IBUPROFEN 600 MG: 200 TABLET, FILM COATED ORAL at 18:40

## 2023-10-17 RX ADMIN — IBUPROFEN 600 MG: 200 TABLET, FILM COATED ORAL at 06:32

## 2023-10-17 RX ADMIN — VANCOMYCIN HYDROCHLORIDE 1000 MG: 1 INJECTION, SOLUTION INTRAVENOUS at 11:03

## 2023-10-17 RX ADMIN — VANCOMYCIN HYDROCHLORIDE 1000 MG: 1 INJECTION, SOLUTION INTRAVENOUS at 22:08

## 2023-10-17 ASSESSMENT — ACTIVITIES OF DAILY LIVING (ADL)
ADLS_ACUITY_SCORE: 35

## 2023-10-17 NOTE — H&P
[facility] History and Physical    Zara Kaminski MRN# [id number]   Age: 33 year old YOB: 1990     Date of Admission:  10/16/2023    Patient Summary:  Zara Kaminski is a 33 year old  PPD#36 s/p  (9/10/23) with h/o mastitis, PLTCS (9/10/23), trauma and stressor related disorder, and ADHD who presents for worsening symptoms of mastitis.     HPI: Approximately 3 weeks ago the patient noticed a lump and redness on her R breast near the medial side of her nipple. She then developed fever, chills, body aches, and breast pain. She completed 7 day course of dicloxacillin (10/3-10/9). Her systemic symptoms improved, but she continued to have symptoms of a lump and redness, localized near the upper lateral R nipple. On 10/11, she had subsequent R breast imaging and fine-needle aspiration at the [facility], with limited aspiration of fluid (~ 5mL) that grew staph aureus (clindamycin, erythromycin resistant). She was then started on a 7 day course of Keflex (10/11). Despite a second course of antibiotics, the breast lump became more hard and raised over this past weekend. She went to the Breast Center again today for another R breast ultrasound and fine-needle aspiration. However, the drainage was unsuccessful with scant return of aspirate. Patient was then advised to come to the hospital for IV antibiotics.    Today she reports some breast pain, but denies fever, chills, nipple discharge, or diarrhea. She has been managing her pain with Tylenol and ibuprofen and reports that this has been adequate for pain control. She has continued to attempt breastfeeding from the R breast and reports that her milk output has been low. She has been breast feeding primarily on the left, and pumping every 3 hours on the right.    ROS: Negative other than above. Mood stable and appropriate.    PMH:   Past Medical History:   Diagnosis Date    ADHD (attention deficit hyperactivity disorder)     Mastitis  10/16/2023    Trauma and stressor-related disorder 2016     PSHx:   Past Surgical History:   Procedure Laterality Date     SECTION N/A 9/10/2023    Procedure:  section;  Surgeon: Gretchen Hernandez MD;  Location: [facility] L[facility]    WISDOM TOOTH EXTRACTION         Medications:   acetaminophen (TYLENOL) 325 MG tablet, Take 2 tablets (650 mg) by mouth every 6 hours as needed for mild pain Start after Delivery.  cephALEXin (KEFLEX) 500 MG capsule, Take 1 capsule (500 mg) by mouth 4 times daily for 7 days  ferrous sulfate (FEROSUL) 325 (65 Fe) MG tablet, Take 1 tablet (325 mg) by mouth every other day  ibuprofen (ADVIL/MOTRIN) 600 MG tablet, Take 1 tablet (600 mg) by mouth every 6 hours as needed for moderate pain Start after delivery  cyclobenzaprine (FLEXERIL) 10 MG tablet, Take 1 tablet (10 mg) by mouth every 8 hours as needed for muscle spasms (Patient not taking: Reported on 2023)  escitalopram (LEXAPRO) 5 MG tablet, Take 1 tablet (5 mg) by mouth daily for 90 days  folic acid-vit B6-vit B12 (FOLGARD) 0.8-10-0.115 MG TABS per tablet, Take by mouth daily (Patient not taking: Reported on 2023)  Misc. Devices (BREAST PUMP) MISC, 1 each continuous prn (breast feeding)  mupirocin (BACTROBAN) 2 % external ointment, Apply to nipples after feedings with plastic wrap.  No need to wash off.  norethindrone (MICRONOR) 0.35 MG tablet, Take 1 tablet (0.35 mg) by mouth daily (Patient not taking: Reported on 2023)  Prenatal Vit-Fe Fumarate-FA (PRENATAL MULTIVITAMIN W/IRON) 27-0.8 MG tablet, Take 1 tablet by mouth daily  senna-docusate (SENOKOT-S/PERICOLACE) 8.6-50 MG tablet, Take 1 tablet by mouth daily Start after delivery.    Allergies:    No Known Allergies    Social History:   Social History     Socioeconomic History    Marital status:      Spouse name: Not on file    Number of children: Not on file    Years of education: Not on file    Highest education level: Not on file   Occupational  History    Occupation:    Tobacco Use    Smoking status: Never    Smokeless tobacco: Never   Vaping Use    Vaping Use: Never used   Substance and Sexual Activity    Alcohol use: Not on file    Drug use: Not Currently    Sexual activity: Yes     Partners: Male   Other Topics Concern    Not on file   Social History Narrative    Not on file     Social Determinants of Health     Financial Resource Strain: Not on file   Food Insecurity: Not on file   Transportation Needs: Not on file   Physical Activity: Not on file   Stress: Not on file   Social Connections: Not on file   Interpersonal Safety: Not on file   Housing Stability: Not on file     Social History     Socioeconomic History    Marital status:    Occupational History    Occupation:    Tobacco Use    Smoking status: Never    Smokeless tobacco: Never   Vaping Use    Vaping Use: Never used   Substance and Sexual Activity    Drug use: Not Currently    Sexual activity: Yes     Partners: Male     Physical Exam:   Vitals:    10/16/23 1853   BP: 123/86   BP Location: Left arm   Pulse: 75   Resp: 16   Temp: 97.9  F (36.6  C)   TempSrc: Oral      Gen: NAD. Sitting up in chair with baby on her chest and partner in the room.  CV: Warm and well perfused  Pulm: No increased work of breathing  Breast: Right breast with erythematous area that is firm to touch at 11 o'clock. Small area that is firm around 4 o'clock. No signs of skin break down or nipple involvement. Nipple normal in appearance without signs of drainage.     Labs & Cultures:  Recent Labs   Lab 10/11/23  1210   CULTURE No anaerobic organisms isolated after 5 days  4+ Staphylococcus aureus*      Imaging  US BREAST RIGHT LIMITED 1-3 QUADRANTS, US BREAST ABSCESS SEROMA RIGHT 10/16/23  - Findings: Preprocedural ultrasound redemonstrated a similar-appearing  heterogeneous fluid collection in the right breast at the approximate  12:00 position 1 cm from the nipple.  - Impression:   Uncomplicated ultrasound-guided right breast abscess /  infected galactocele aspiration. Recommend continued clinical  follow-up with completion of antibiotics course, primary follow-up,  and return to the breast center in approximately 1 week for repeat  right breast ultrasound. Earlier return precautions were given.    US BREAST RIGHT LIMITED 1-3 QUADRANTS 10/11/23  - Findings: Targeted ultrasound by technologist and radiologist demonstrates.  There is mild erythema the upper right breast. Ultrasound of the right  breast performed 12:00 1 cm from the nipple showing a complex fluid  collection measuring approximately 5 cm. Local anesthetic was achieved  with 1% lidocaine. Under ultrasound guidance, an 18-gauge needle was  used to aspirate the fluid collection. Approximately 5 cc of cloudy  white fluid was aspirated. The majority of the fluid collection would  not aspirate.  - Impression: BI-RADS CATEGORY: 2 - Benign. Probable infected galactocele of the RIGHT breast.     MA DIAGNOSTIC BILATERAL W/ SAGE 10/11/23  - Findings: Increased density within the central right subareolar right breast. No suspicious FINDINGS left breast.    A&P: Zara Kaminski is a 33 year old  PPD#36 s/p  (9/10/23) with h/o mastitis, PLTCS (9/10/23), trauma and stressor related disorder, and ADHD who presents for worsening symptoms of mastitis. She first had symptoms of mastitis approximately three weeks ago and has failed outpatient management with two courses of oral antibiotics (Keflex and dicloxacillin) and two needle aspirations with limited return of aspirate. It is possible that patient failed prior antibiotics due to poor source control, as needle aspirations had limited return of aspirate, or inadequate antibiotic penetration. Previous oral antibiotics should have appropriately covered aspirate organisms based on culture sensitivities.    Mastitis  - Admit to Gynecology inpatient  - Start IV Ceftriaxone 2g q24h, IV  Vancomycin, pharmacy to dose  - S/p drainage x 2 (10/16, 10/11)  - CBC and CRP to trend response to treatment  - Ibuprofen, Tylenol q6h for discomfort  - Lactation team consulted  - Encourage pumping/feeding from R breast as able    Postpartum  - Routine postpartum cares    Eliza Mercado, MS3   Resident Attestation  I, Caitlin Weinstein MD, was present with the medical student who participated in the service and in the documentation of the note.  I have verified the history and personally performed the physical exam and medical decision making.  I agree with the assessment and plan of care as documented in the note.    Caitlin Weinstein MD  Obstetrics & Gynecology, PGY-2  10/16/2023 8:17 PM           Physician Attestation   I personally examined and evaluated this patient.  I discussed the patient with the resident/fellow and care team, and agree with the assessment and plan of care as documented in the note.     Key findings: Breast abscess worsening on oral antibiotics.   Plan labs and then IV antibiotics for at least 24 hours. Continuing to pump for now. Is supplementing with formula.     Please see A&P for additional details of medical decision making.    I have personally reviewed the following data over the past 24 hrs:    Recent Results (from the past 24 hour(s))   CBC with platelets    Collection Time: 10/16/23  8:03 PM   Result Value Ref Range    WBC Count 9.0 4.0 - 11.0 10e3/uL    RBC Count 3.91 3.80 - 5.20 10e6/uL    Hemoglobin 10.9 (L) 11.7 - 15.7 g/dL    Hematocrit 34.7 (L) 35.0 - 47.0 %    MCV 89 78 - 100 fL    MCH 27.9 26.5 - 33.0 pg    MCHC 31.4 (L) 31.5 - 36.5 g/dL    RDW 13.7 10.0 - 15.0 %    Platelet Count 352 150 - 450 10e3/uL         Keysha Roca MD  Date of Service (when I saw the patient): 10/16/23

## 2023-10-17 NOTE — PROGRESS NOTES
Patient arrived to unit from home at 1830 with c/o tenderness of right breast and noted redness above nipple. Oriented to room and unit set up. Plan of care will be reviewed with patient once order is placed.

## 2023-10-17 NOTE — PLAN OF CARE
Goal Outcome Evaluation:      Plan of Care Reviewed With: patient    Patient started on IV antibiotics, flyer was called to insert IV line, IV Ceftriaxone and Vancomycin administered. Taking Tylenol and Ibuprofen for pain control. Patient breastfeeding her baby and also pumping, able to ambulate in room, voiding freely, eating and drinking. Continue with plan of care.

## 2023-10-17 NOTE — UTILIZATION REVIEW
Admission Status; Secondary Review Determination       Under the authority of the Utilization Management Committee, the utilization review process indicated a secondary review on the above patient. The review outcome is based on review of the medical records, discussions with staff, and applying clinical experience noted on the date of the review.     (x) Inpatient Status Appropriate - This patient's medical care is consistent with medical management for inpatient care and reasonable inpatient medical practice.     RATIONALE FOR DETERMINATION     Patient requires inpatient admission versus short stay observation or outpatient treatment for the following reasons:    33 year old female with history of mastitis, ADHD who presents with worsening symptoms of mastitis. 3 weeks prior to admission on 10/16, she had lump and redness to the medial of the nipple and then developed fever, chills, and breast pain. She completed a course of dicloxicillin from 10/3-10/9. She unfortunately had continued symptoms and then had R breast imaging and fine needle aspiration that ended up growing staph aureus. She then completed a 7 day course of Keflex. She unfortunately continued to have symptoms and presented again the weekend prior to admission for NFA however this was unsuccessful and now she was sent to the ED on 10/16. She was admitted and underwent US guided aspiration. She is on broad spectrum antibiotics of vancomycin and ceftriaxone. Given her clear failure to respond to 2 course of oral antibiotics would agree with acute inpatient management with IV antibiotics under inpatient status given failure to improve with conservative outpatient measures despite culture directed therapy.        The expected length of stay at the time of admission was more than 2 nights because of the severity of illness, intensity of service provided, and risk for adverse outcome. Inpatient admission is appropriate.         This document was produced  using voice recognition software       The information on this document is developed by the utilization review team in order for the business office to ensure compliance. This only denotes the appropriateness of proper admission status and does not reflect the quality of care rendered.   The definitions of Inpatient Status and Observation Status used in making the determination above are those provided in the CMS Coverage Manual, Chapter 1 and Chapter 6, section 70.4.   Sincerely,   Lucio Benz DO  Physician Advisor  [facility].

## 2023-10-17 NOTE — PROGRESS NOTES
Per pt, she has been breast feeding for a couple of minutes and then breast pumping for 15 minutes.  Explained heat and ice for comfort.  Heat or ice for a few minutes - prn.   KINJAL Fernando LC notified and will see pt to discuss further.

## 2023-10-17 NOTE — LACTATION NOTE
"Consult For: Admit to Unit [facility]    Medical History:      Patient Active Problem List   Diagnosis    Situational anxiety    Trauma and stressor-related disorder    Vision impairment    Former tobacco use    Benign neoplasm of skin of face    ADHD (attention deficit hyperactivity disorder)    Need for Tdap vaccination    Pregnancy    Mastitis     Current Facility-Administered Medications   Medication    acetaminophen (TYLENOL) tablet 650 mg    Or    acetaminophen (TYLENOL) Suppository 650 mg    cefTRIAXone (ROCEPHIN) 2 g vial to attach to  ml bag for ADULTS or NS 50 ml bag for PEDS    ibuprofen (ADVIL/MOTRIN) tablet 600 mg    melatonin tablet 1 mg    senna-docusate (SENOKOT-S/PERICOLACE) 8.6-50 MG per tablet 1 tablet    Or    senna-docusate (SENOKOT-S/PERICOLACE) 8.6-50 MG per tablet 2 tablet    sodium chloride (PF) 0.9% PF flush 3 mL    vancomycin (VANCOCIN) 1,000 mg in 200 mL dextrose intermittent infusion     Delivered baby Graham on 9/10/23.  Has had problems with plugged ducts since about 1 week postpartum.  Has been on dicloxacillin and keflex prior to admission.  R breast abscess drained 10/11 at the breast center and again on 10/16.  Now here for IV antibiotics.    Feeding History:  Breastfeeding Graham every 2-3 hours.  Graham has had a history of poor milk transfer and they started using a nipple shield on the advice of their outpatient lactation consultant.  With the shield, Graham seemed to transfer milk well.  Zara has been pumping and getting 1.5-2oz with each pumping session.  She is concerned about milk production while dealing with the breast abscess.    Feeding Assessment:  Graham is sleeping during our visit.  Assess at future time.    Education/Plan: Discussed the importance of emptying the breast with each feeding since Graham has been a \"lazy\" eater, they are using nipple shields, and pumping.  Encouraged good cleaning of all pump parts and nipple shields, including daily sanitization.  Zara should " "continue to breastfeed and/or pump the affected breast, ensuring that milk is moved out of the breast and is not staying stagnant.  Discussed \"hands-on pumping\" as a means to do so, and also discussed heat vs. Ice for treatment.       Since nipple shields were started due to low milk transfer, could consider doing more work with Graham to see if he still has poor transfer of milk to see if shields are needed, or if he would do a more significant transfer without the shield.    Kitty Richter RN, IBCLC   Lactation Consultant  Ascom: *[phone]  Office: [phone]    "

## 2023-10-17 NOTE — PROGRESS NOTES
"VSS.  Assessments WNL except for R breast masitis/abscess.  Abscess marked by Dr Bruce this morning.  Pt receiving IV abxs.  Seen by LC.  Feeding Graham independently.  Continues to pump after feedings.  Sometimes applies heat prior to feedings.  Pt states understanding of cleaning pump/breast supplies.  Using motrin/tylenol.  Reports breast tenderness as \"3\" if presses on abscess, otherwise, comfortable.  Here with son, Graham. And spouse, Ej.  Call light is within reach.  Pt will put on call light for assistance.  Frequently walking in room.  Voiding.    "

## 2023-10-17 NOTE — DISCHARGE SUMMARY
[facility]  Gynecology Discharge Summary    Zara Kaminski    YOB: 1990  MRN# [id number]   Age: 33 year old         Date of Admission:  10/16/2023  Date of Discharge:  10/21/2023  Admitting Physician:  Keysha Roca MD  Discharge Physician:  Gretchen Hernandez MD  Discharging Service:  Gynecology     Primary Provider: No Ref-Primary, Physician    Admission Diagnosis:  - POD#36 s/p   - Mastitis    Discharge Diagnosis:  - Breast abscess s/p IR drainage  - Same as above    Admission History:  Zara Kaminski is a 33 year old  PPD#36 s/p  (9/10/23) with h/o mastitis, PLTCS (9/10/23), trauma and stressor related disorder, and ADHD who presents for worsening symptoms of mastitis. She first had symptoms of mastitis approximately three weeks ago and has failed outpatient management with two courses of oral antibiotics (Keflex and dicloxacillin) and two needle aspirations with limited return of aspirate. It is possible that patient failed prior antibiotics due to poor source control, as needle aspirations had limited return of aspirate, or inadequate antibiotic penetration. Previous oral antibiotics should have appropriately covered aspirate organisms based on culture sensitivities.    Consults:  Surgery  Interventional Radiology    Hospital Course:  She was admitted to gynecology and started on IV ceftriaxone and IV vancomycin, with ibuprofen and tylenol for pain. She did not have leukocytosis. CRP was trended and decreased from 11 to 9 to 6. She felt improvement in the size of her breast abscess. On 10/18, she was transitioned to IV Ancef per recommendations by Antibiotic Stewardship team and continued on this prior to her discharge. On 10/21, she had a successful ultrasound-guided right breast abscess aspiration performed with IR in which 22cc of thick pus were aspirated.     On HD#5 her pain was significantly improved and noted redness had decreased. She was  tolerating regular diet, ambulating without difficulty, voiding spontaneously, and controlling pain with PO medications, and she was deemed stable for discharge.    Discharge Plans:  - The patient was discharged to home on oral Bactrim for the next 14 days as well as tylenol and ibuprofen for pain control.   - She was instructed to call the clinic or return to ED if she has any of the following:    - Temperature greater than 100.4F   - Pain not controlled by pain medications   - Increased breast drainage   - Increased breast redness  - She will follow up with Dr. Hernandez on 10/23/23    Discharge medications include:     Review of your medicines        UNREVIEWED medicines. Ask your doctor about these medicines        Dose / Directions   cyclobenzaprine 10 MG tablet  Commonly known as: FLEXERIL  Used for: Acute post-operative pain      Dose: 10 mg  Take 1 tablet (10 mg) by mouth every 8 hours as needed for muscle spasms  Quantity: 12 tablet  Refills: 0     folic acid-vit B6-vit B12 0.8-10-0.115 MG Tabs per tablet  Commonly known as: FOLGARD      Take by mouth daily  Refills: 0     norethindrone 0.35 MG tablet  Commonly known as: MICRONOR  Used for: S/P primary low transverse       Dose: 0.35 mg  Take 1 tablet (0.35 mg) by mouth daily  Quantity: 84 tablet  Refills: 3            START taking        Dose / Directions   sulfamethoxazole-trimethoprim 800-160 MG tablet  Commonly known as: BACTRIM DS  Used for: Breast abscess      Dose: 1 tablet  Take 1 tablet by mouth 2 times daily  Quantity: 28 tablet  Refills: 0            CONTINUE these medicines which may have CHANGED, or have new prescriptions. If we are uncertain of the size of tablets/capsules you have at home, strength may be listed as something that might have changed.        Dose / Directions   acetaminophen 325 MG tablet  Commonly known as: TYLENOL  This may have changed: additional instructions  Used for: Encounter for supervision of normal first  pregnancy in third trimester      Dose: 650 mg  Take 2 tablets (650 mg) by mouth every 6 hours as needed for mild pain  Quantity: 100 tablet  Refills: 0     ibuprofen 600 MG tablet  Commonly known as: ADVIL/MOTRIN  This may have changed:   reasons to take this  additional instructions  Used for: Breast abscess      Dose: 600 mg  Take 1 tablet (600 mg) by mouth every 6 hours as needed for inflammatory pain, mild pain or fever  Quantity: 30 tablet  Refills: 0            CONTINUE these medicines which have NOT CHANGED        Dose / Directions   breast pump Misc  Used for: Breast feeding status of mother      Dose: 1 each  1 each continuous prn (breast feeding)  Quantity: 1 each  Refills: 0     escitalopram 5 MG tablet  Commonly known as: LEXAPRO  Used for: Current episode of major depressive disorder without prior episode, unspecified depression episode severity      Dose: 5 mg  Take 1 tablet (5 mg) by mouth daily for 90 days  Quantity: 90 tablet  Refills: 3     ferrous sulfate 325 (65 Fe) MG tablet  Commonly known as: FEROSUL  Used for: S/P primary low transverse       Dose: 325 mg  Take 1 tablet (325 mg) by mouth every other day  Quantity: 30 tablet  Refills: 1     mupirocin 2 % external ointment  Commonly known as: BACTROBAN  Used for: Sore nipples due to lactation      Apply to nipples after feedings with plastic wrap.  No need to wash off.  Quantity: 30 g  Refills: 1     prenatal multivitamin w/iron 27-0.8 MG tablet      Dose: 1 tablet  Take 1 tablet by mouth daily  Refills: 0     senna-docusate 8.6-50 MG tablet  Commonly known as: SENOKOT-S/PERICOLACE  Used for: Encounter for supervision of normal first pregnancy in third trimester      Dose: 1 tablet  Take 1 tablet by mouth daily Start after delivery.  Quantity: 100 tablet  Refills: 0            STOP taking      cephALEXin 500 MG capsule  Commonly known as: KEFLEX                  Where to get your medicines        These medications were sent to  [facility] [place], [place] - [address]  [address] [facility] [address], [facility] [address]      Phone: [phone]   acetaminophen 325 MG tablet  ibuprofen 600 MG tablet  sulfamethoxazole-trimethoprim 800-160 MG tablet         Sandra Zamarripa MD  Obstetrics and Gynecology, PGY-1  10/21/23 11:53 AM

## 2023-10-17 NOTE — PHARMACY-VANCOMYCIN DOSING SERVICE
Pharmacy Vancomycin Initial Note  Date of Service 2023  Patient's  1990  33 year old, female    Indication: Abscess    Current estimated CrCl = Estimated Creatinine Clearance: 108.1 mL/min (based on SCr of 0.82 mg/dL).    Creatinine for last 3 days  10/16/2023:  8:03 PM Creatinine 0.82 mg/dL    Recent Vancomycin Level(s) for last 3 days  No results found for requested labs within last 3 days.      Vancomycin IV Administrations (past 72 hours)        No vancomycin orders with administrations in past 72 hours.                    Nephrotoxins and other renal medications (From now, onward)      Start     Dose/Rate Route Frequency Ordered Stop    10/16/23 2100  vancomycin (VANCOCIN) 1,250 mg in 0.9% NaCl 250 mL intermittent infusion         1,250 mg  over 90 Minutes Intravenous ONCE 10/16/23 2007      10/16/23 1929  ibuprofen (ADVIL/MOTRIN) tablet 600 mg         600 mg Oral EVERY 6 HOURS PRN 10/16/23 1929              Contrast Orders - past 72 hours (72h ago, onward)      None            InsightRX Prediction of Planned Initial Vancomycin Regimen  Loading dose: 1250mg IV once  Regimen: 1000 mg IV every 12 hours.  Start time: 20:54 on 10/16/2023  Exposure target: AUC24 (range)400-600 mg/L.hr   AUC24,ss: 409 mg/L.hr  Probability of AUC24 > 400: 52 %  Ctrough,ss: 12.3 mg/L  Probability of Ctrough,ss > 20: 14 %  Probability of nephrotoxicity (Lodise STEPHANIE ): 8 %        Plan:  Start vancomycin: give 1250mg IV x1, then resume 1000mg IV q12h  Vancomycin monitoring method: AUC  Vancomycin therapeutic monitoring goal: 400-600 mg*h/L  Pharmacy will check vancomycin levels as appropriate in 1-3 Days.    Serum creatinine levels will be ordered a minimum of twice weekly.      JOANNA MIRANDA RP

## 2023-10-17 NOTE — PROGRESS NOTES
[facility]  Gynecology Progress Note  S:  Patient feels okay this morning. Feels that the abscess in her breast has not changed from admission. Pain well controlled with PO medications. She continues to breastfeed on the left and pump on the right, though she reports her supply has decreased. She denies any nausea or vomiting, fever, chills, and is tolerating the antibiotics with no adverse effects     O:  Patient Vitals for the past 24 hrs:   BP Temp Temp src Pulse Resp   10/17/23 0635 119/72 97.5  F (36.4  C) Oral 57 17   10/17/23 0358 114/77 97.5  F (36.4  C) Oral 56 16   10/16/23 2252 111/66 97.7  F (36.5  C) Oral 61 16   10/16/23 1853 123/86 97.9  F (36.6  C) Oral 75 16     Gen: Resting comfortably, NAD  CV: Extremities warm and well perfused  Pulm: Breathing comfortably on room air  Breast: Right breast with erythematous area that is firm to touch at 11 o'clock. Small area that is firm around 4 o'clock. No signs of skin break down or nipple involvement. Nipple normal in appearance without signs of drainage       Hemoglobin   Date Value Ref Range Status   10/16/2023 10.9 (L) 11.7 - 15.7 g/dL Final   04/15/2010 12.9 11.7 - 15.7 g/dL Final   ]    A/P:  Zara Kaminski is a 33 year old  PPD#36 s/p PLTCS with h/o mastitis, trauma and stressor related disorder, and ADHD who presents for worsening symptoms of mastitis. Based on previous breast aspirate, prior trialed oral antibiotics should have appropriately covered organisms based on culture sensitivities, patient is being treated with IV antibiotics until an appropriate clinical response is seen.     Mastitis  - Continue inpatient admission  - IV Ceftriaxone 2g q24h, IV Vancomycin, pharmacy to dose. Continue for at least 24 hours, then transition to PO antibiotics based on sensitivities.  - S/p drainage x 2 (10/16, 10/11)  - CBC shows normal WBC count (9.0 yesterday > 6.9 today), CRP pending  - Ibuprofen, Tylenol q6h for  discomfort, treating appropriately  - Lactation team consulted, pending note  - Continue to pump/feed from R breast as able     Postpartum  - Routine postpartum cares    Elyssa Velasquez, MS-3  [employer]    RESIDENT WITH STUDENT: I saw the patient with the medical student on the date of the medical student's note and performed, or re-performed, the physical exam and medical decision-making in the provision of this service and have verified the accuracy of all the medical student documentation and edited as necessary.     Dashawn Swann MD  Obstetrics and Gynecology, PGY-1  10/17/23 6:55 AM          Physician Attestation   I personally examined and evaluated this patient.  I discussed the patient with the resident/fellow and care team, and agree with the assessment and plan of care as documented in the note.     Key findings: 33 year old  admitted for inpatient management of lactational mastitis complicated by breast abscess that has failed outpatient oral antibiotics and status post attempted drainage x2. Started on ceftriaxone and vancomycin last night but patient reports no improvement yet. Right breast with persistent erythema, firmness and tenderness. Afebrile and no leukocytosis. downtrending CRP. I outlined the redness with a thin line and the firmness with a thicker line on her breast so we can monitor for improvement. Discussed likely 48-72h IV antibiotics, then transition to back to oral for 10-14d.     Please see A&P for additional details of medical decision making.    I have personally reviewed the following data over the past 24 hrs:    6.9  \   10.4 (L)   / 305     N/A N/A N/A /  N/A   N/A N/A 0.82 \       Procal: N/A CRP: 9.31 (H) Lactic Acid: N/A           Gretchen Bruce MD  Date of Service (when I saw the patient): 10/17/23

## 2023-10-17 NOTE — PROGRESS NOTES
[facility]  Gynecology Progress Note    S:  Zara is feeling well this morning. Feels like her breast pain is about the same as yesterday, only tender to palpation. Not sure if the area of swelling has gone down at all. Denies fevers/chills. Still trying to pump every 3 hours on the right breast, and breastfeed on the left. Tolerating PO without nausea/vomiting.     O:  Patient Vitals for the past 24 hrs:   BP Temp Temp src Pulse Resp   10/18/23 0141 113/75 97.9  F (36.6  C) Oral 63 16   10/17/23 1834 117/70 -- -- 62 16   10/17/23 1635 (!) 110/90 97.5  F (36.4  C) Oral 92 16   10/17/23 0859 114/77 97.8  F (36.6  C) Oral -- 16     Gen: Resting comfortably, NAD  CV: Extremities warm and well perfused  Pulm: Breathing comfortably on room air  Breast: Right breast with erythematous area that is firm to touch at 11 o'clock. Small area that is firm around 4 o'clock. No signs of skin break down or nipple involvement. Nipple normal in appearance without signs of drainage. Erythema unchanged from thin line outlined on 10/17. Induration appears slightly decreased from the thick line outlined on 10/17.     Latest Reference Range & Units 10/16/23 20:03 10/17/23 06:29   CRP Inflammation <5.00 mg/L 11.10 (H) 9.31 (H)      Latest Reference Range & Units 10/16/23 20:03 10/17/23 06:29   WBC 4.0 - 11.0 10e3/uL 9.0 6.9     A/P:  Zara Kaminski is a 33 year old  PPD#38 s/p PLTCS with h/o mastitis, trauma and stressor related disorder, and ADHD who presents for worsening symptoms of mastitis. Based on previous breast aspirate, prior trialed oral antibiotics should have appropriately covered organisms based on culture sensitivities, patient is being treated with IV antibiotics until an appropriate clinical response is seen.     Mastitis and breast abscess:  - Continue inpatient admission  - IV Ceftriaxone 2g q24h, IV Vancomycin. Continue for 48-72h, depending on clinical improvement in symptoms.  - Plan  to transition to PO Augmentin following IV antibiotic therapy  - S/p drainage x 2 (10/16, 10/11)  - CBC shows normal WBC count 9.0>6.9>pending, CRP 11>9>pending  - Ibuprofen, Tylenol q6h for discomfort, treating appropriately  - Lactation team consulted, pending note  - Continue to pump/feed from R breast as able     Postpartum  - Routine postpartum cares    Lo Calvo MD  OB/GYN, PGY-4  10/18/2023, 7:01 AM     Physician Attestation   I personally examined and evaluated this patient.  I discussed the patient with the resident/fellow and care team, and agree with the assessment and plan of care as documented in the note.     Key findings: Patient reports she feels the same as yesterday.  Denies any fever, chills, or body aches.  Pain is ok. Continues to pump and feed baby with bilateral breasts.  On exam induration remains to the edge of the bold line drawn.  Erythema slightly less prominent at the border of the thin line.  Discussed with patient continuation of IV antibiotic therapy for 24 additional hours and then reassess.  Briefly discussed if no improvement with 72 hours of appropriate antibiotics, would plan for consultation by breast surgery.  Repeat CBC and CRP ordered for this morning.     Hermila Lopez MD  Date of Service (when I saw the patient): 10/18/23

## 2023-10-18 LAB
BACTERIA ABSC ANAEROBE+AEROBE CULT: NORMAL
CREAT SERPL-MCNC: 0.86 MG/DL (ref 0.51–0.95)
CRP SERPL-MCNC: 9.23 MG/L
EGFRCR SERPLBLD CKD-EPI 2021: 90 ML/MIN/1.73M2
ERYTHROCYTE [DISTWIDTH] IN BLOOD BY AUTOMATED COUNT: 13.8 % (ref 10–15)
HCT VFR BLD AUTO: 34.2 % (ref 35–47)
HGB BLD-MCNC: 10.9 G/DL (ref 11.7–15.7)
MCH RBC QN AUTO: 28.4 PG (ref 26.5–33)
MCHC RBC AUTO-ENTMCNC: 31.9 G/DL (ref 31.5–36.5)
MCV RBC AUTO: 89 FL (ref 78–100)
PLATELET # BLD AUTO: 320 10E3/UL (ref 150–450)
RBC # BLD AUTO: 3.84 10E6/UL (ref 3.8–5.2)
VANCOMYCIN SERPL-MCNC: 11.1 UG/ML
WBC # BLD AUTO: 6.6 10E3/UL (ref 4–11)

## 2023-10-18 PROCEDURE — 258N000003 HC RX IP 258 OP 636

## 2023-10-18 PROCEDURE — 85027 COMPLETE CBC AUTOMATED: CPT | Performed by: STUDENT IN AN ORGANIZED HEALTH CARE EDUCATION/TRAINING PROGRAM

## 2023-10-18 PROCEDURE — 82565 ASSAY OF CREATININE: CPT | Performed by: OBSTETRICS & GYNECOLOGY

## 2023-10-18 PROCEDURE — 250N000011 HC RX IP 250 OP 636: Mod: JZ | Performed by: OBSTETRICS & GYNECOLOGY

## 2023-10-18 PROCEDURE — 250N000013 HC RX MED GY IP 250 OP 250 PS 637

## 2023-10-18 PROCEDURE — 36415 COLL VENOUS BLD VENIPUNCTURE: CPT | Performed by: STUDENT IN AN ORGANIZED HEALTH CARE EDUCATION/TRAINING PROGRAM

## 2023-10-18 PROCEDURE — 250N000011 HC RX IP 250 OP 636: Mod: JZ | Performed by: STUDENT IN AN ORGANIZED HEALTH CARE EDUCATION/TRAINING PROGRAM

## 2023-10-18 PROCEDURE — 99232 SBSQ HOSP IP/OBS MODERATE 35: CPT | Mod: 24 | Performed by: OBSTETRICS & GYNECOLOGY

## 2023-10-18 PROCEDURE — 120N000002 HC R&B MED SURG/OB UMMC

## 2023-10-18 PROCEDURE — 80202 ASSAY OF VANCOMYCIN: CPT | Performed by: OBSTETRICS & GYNECOLOGY

## 2023-10-18 PROCEDURE — 86140 C-REACTIVE PROTEIN: CPT | Performed by: STUDENT IN AN ORGANIZED HEALTH CARE EDUCATION/TRAINING PROGRAM

## 2023-10-18 RX ORDER — SODIUM CHLORIDE, SODIUM LACTATE, POTASSIUM CHLORIDE, CALCIUM CHLORIDE 600; 310; 30; 20 MG/100ML; MG/100ML; MG/100ML; MG/100ML
INJECTION, SOLUTION INTRAVENOUS
Status: COMPLETED
Start: 2023-10-18 | End: 2023-10-18

## 2023-10-18 RX ORDER — CEFAZOLIN SODIUM 2 G/100ML
2 INJECTION, SOLUTION INTRAVENOUS EVERY 8 HOURS
Status: DISCONTINUED | OUTPATIENT
Start: 2023-10-18 | End: 2023-10-21 | Stop reason: HOSPADM

## 2023-10-18 RX ORDER — SODIUM CHLORIDE, SODIUM LACTATE, POTASSIUM CHLORIDE, CALCIUM CHLORIDE 600; 310; 30; 20 MG/100ML; MG/100ML; MG/100ML; MG/100ML
INJECTION, SOLUTION INTRAVENOUS CONTINUOUS
Status: DISCONTINUED | OUTPATIENT
Start: 2023-10-18 | End: 2023-10-21 | Stop reason: HOSPADM

## 2023-10-18 RX ADMIN — VANCOMYCIN HYDROCHLORIDE 1000 MG: 1 INJECTION, SOLUTION INTRAVENOUS at 10:37

## 2023-10-18 RX ADMIN — ACETAMINOPHEN 650 MG: 325 TABLET, FILM COATED ORAL at 08:40

## 2023-10-18 RX ADMIN — IBUPROFEN 600 MG: 200 TABLET, FILM COATED ORAL at 08:39

## 2023-10-18 RX ADMIN — SODIUM CHLORIDE, POTASSIUM CHLORIDE, SODIUM LACTATE AND CALCIUM CHLORIDE 1000 ML: 600; 310; 30; 20 INJECTION, SOLUTION INTRAVENOUS at 17:46

## 2023-10-18 RX ADMIN — CEFAZOLIN SODIUM 2 G: 2 INJECTION, SOLUTION INTRAVENOUS at 17:43

## 2023-10-18 RX ADMIN — SODIUM CHLORIDE, SODIUM LACTATE, POTASSIUM CHLORIDE, CALCIUM CHLORIDE 1000 ML: 600; 310; 30; 20 INJECTION, SOLUTION INTRAVENOUS at 17:46

## 2023-10-18 RX ADMIN — IBUPROFEN 600 MG: 200 TABLET, FILM COATED ORAL at 14:30

## 2023-10-18 RX ADMIN — IBUPROFEN 600 MG: 200 TABLET, FILM COATED ORAL at 01:54

## 2023-10-18 RX ADMIN — ACETAMINOPHEN 650 MG: 325 TABLET, FILM COATED ORAL at 01:54

## 2023-10-18 RX ADMIN — ACETAMINOPHEN 650 MG: 325 TABLET, FILM COATED ORAL at 14:32

## 2023-10-18 ASSESSMENT — ACTIVITIES OF DAILY LIVING (ADL)
ADLS_ACUITY_SCORE: 35

## 2023-10-18 NOTE — PROGRESS NOTES
Antimicrobial Stewardship Team Note    Antimicrobial Stewardship Program - A joint venture between [facility] and  Physicians to optimize antibiotic management.  NOT a formal consult - Restricted Antimicrobial Review     Patient: Zara Kaminski  MRN: [id number]  Allergies: Patient has no known allergies.    Brief Summary: Zara Kaminski is a 33 year old female with a past medical history significant for   PPD#38 s/p PLTCS with h/o mastitis, trauma and stressor related disorder, and ADHD who presents for worsening mastitis.     HPI: Patient noticed lump and redness on R breast approximately 3 weeks ago. She developed fever, chills, body aches, and breast pain and was prescribed a 7 day course of dicloxacillin 500 mg QID on 10/1. She reported symptomatic improvement but continued to have lump and redness. On 10/11, patient had R breast imaging and fine-needle aspiration that grew MSSA (resistant to clindamycin and erythromycin). She was then prescribed a 7 day course of cephalexin 500 mg QID. While on cephalexin, the lump became more hard and raised prompting patient to return to [facility] Center on 10/16 for R breast US and fine needle aspiration. Drainage was unsuccessful with scant return of aspirate and patient was advised to come to hospital for IV antibiotics. On presentation to the hospital, patient was afebrile and hemodynamically stable (BP 110s/60-70s, HR 60s, RR 16). Labs were unremarkable with WBC within normal levels at 9.0 and elevated CRP at 11.1. No additional imaging or cultures were taken. Patient was started on intravenous vancomycin and ceftriaxone.          Active Anti-infective Medications   (From admission, onward)                 Start     Stop    10/17/23 1030  vancomycin  1,000 mg,   Intravenous,   200 mL/hr,   EVERY 12 HOURS        Abscess       --    10/16/23 1930  cefTRIAXone  2 g,   Intravenous,   EVERY 24 HOURS        Mastitis, known abscess, refractory to outpatient  abx and drainage x2       --                  Assessment:   Methicillin Sensitive Staphylococcus aureus mastitis  Today, patient remains afebrile and hemodynamically stable. Patient reported that pain remains the same as yesterday and erythema was noted to be unchanged although induration is decreased. Prior to presentation, patient had received 7 days of dicloxacillin without resolution and was on day 5/7 of cephalexin treatment. Today, the patient remains on vancomycin and ceftriaxone. Agree with the the initial use of IV antibiotics. With previous cultures showing growth of MSSA, and the patient having no MRSA history, recommend discontinuing vancomycin. Additionally, there is an opportunity to switch from ceftriaxone to a more narrow cephalosporin. Recommend switching from ceftriaxone to cefazolin 2g IV every 8 hours. Patient may require additional drainage in order to attain source control which could be why she did not fully respond to previous oral antibiotics. On discharge, recommend resumption of cephalexin 500 mg every 6 hours with duration dependent on patient's clinical response and the attainment of source control.     Recommendations:  Discontinue vancomycin and ceftriaxone.  Initiate cefazolin 2g IV every 8 hours and then transition back to cephalexin 500 mg every 6 hours on discharge.  Consider additional drainage as duration of therapy will be dependent on adequate source control and patient's clinical response.     Medication Change: discontinue one or more antibiotic(s) - Vancomycin IV, Ceftriaxone.  Pharmacy took the following actions: Called/paged provider, Electronic note created.    Discussed with ID Staff Eren Patterson MD and Kayla Camacho PharmD, BCIDP  Jai Owens PharmD PGY1 Resident  Phone: [phone]    Vital Signs/Clinical Features:  Vitals         10/16 0700  10/17 0659 10/17 0700  10/18 0659 10/18 0700  10/18 1344   Most Recent      Temp ( F) 97.5 -  97.9    97.5 -  97.9      97.6      97.6 (36.4) 10/18 0836    Pulse 56 -  75    62 -  92      58     58 10/18 0836    Resp 16 -  17      16      16     16 10/18 0836    /66 -  123/86    110/90 -  117/70      117/84     117/84 10/18 0836            Labs  Estimated Creatinine Clearance: 103.1 mL/min (based on SCr of 0.86 mg/dL).  Recent Labs   Lab Test 10/16/23  2003 10/18/23  0821   CR 0.82 0.86       Recent Labs   Lab Test 02/08/23  1418 06/02/23  1411 09/09/23  0855 09/11/23  0842 10/16/23  2003 10/17/23  0629 10/18/23  0821   WBC 9.1  --  7.8  --  9.0 6.9 6.6   HGB 12.8 11.8 12.5 9.3* 10.9* 10.4* 10.9*   HCT 37.3  --  36.4  --  34.7* 32.2* 34.2*   MCV 90  --  88  --  89 89 89     --  186  --  352 305 320             Recent Labs   Lab Test 10/16/23  2003 10/17/23  0629 10/18/23  0821   CRPI 11.10* 9.31* 9.23*       Recent Labs   Lab Test 10/18/23  0821   VANCOMYCIN 11.1       Culture Results:  7-Day Micro Results       ** No results found for the last 168 hours. **            Recent Labs   Lab Test 02/08/23  1421   URINEPH 7.0   NITRITE Negative   LEUKEST Negative                         Imaging: US Breast Abscess Seroma Right    Result Date: 10/17/2023  EXAMINATION: US BREAST RIGHT LIMITED 1-3 QUADRANTS, US BREAST ABSCESS SEROMA RIGHT, 10/16/2023 2:11 PM HISTORY: RIght breast infected galactocele. COMPARISON: 10/11/2023 FINDINGS: Preprocedural ultrasound redemonstrated a similar-appearing heterogeneous fluid collection in the right breast at the approximate 12:00 position 1 cm from the nipple. CONSENT: The procedure, benefits and risks, were explained and discussed with the patient. Risks included: infection, bleeding, and the small possibility of even life threatening complications. Written and verbal consent were obtained. PROCEDURE: Using aseptic technique, up to 10 cc of 1% lidocaine buffered with sodium bicarbonate for local anesthesia, ultrasound guidance, and an 18-gauge needle were utilized to aspirate the right breast  collection. Less than 5 mL of cloudy tan/milky fluid was able to be aspirated through the needle. Following removal of the needle, there was additional similar-appearing drainage from the skin. A bandage/dressing was placed and post-aspiration care instructions were discussed with the patient. There were no apparent complications.     IMPRESSION: Uncomplicated ultrasound-guided right breast abscess / infected galactocele aspiration. Recommend continued clinical follow-up with completion of antibiotics course, primary follow-up, and return to the breast center in approximately 1 week for repeat right breast ultrasound. Earlier return precautions were given. Procedure performed by Dr. Correia under my supervision.  I, Houston Branch MD, was present for the entire procedure.critical portion of the procedure and was immediately available. I have personally reviewed the examination and initial interpretation and I agree with the findings. HOUSTON BRANCH MD   SYSTEM ID:  [id number]     US Breast Right    Result Date: 10/16/2023  EXAMINATION: US BREAST RIGHT LIMITED 1-3 QUADRANTS, US BREAST ABSCESS SEROMA RIGHT, 10/16/2023 2:11 PM HISTORY: RIght breast infected galactocele. COMPARISON: 10/11/2023 FINDINGS: Preprocedural ultrasound redemonstrated a similar-appearing heterogeneous fluid collection in the right breast at the approximate 12:00 position 1 cm from the nipple. CONSENT: The procedure, benefits and risks, were explained and discussed with the patient. Risks included: infection, bleeding, and the small possibility of even life threatening complications. Written and verbal consent were obtained. PROCEDURE: Using aseptic technique, up to 10 cc of 1% lidocaine buffered with sodium bicarbonate for local anesthesia, ultrasound guidance, and an 18-gauge needle were utilized to aspirate the right breast collection. Less than 5 mL of cloudy tan/milky fluid was able to be aspirated through the needle. Following removal of the  needle, there was additional similar-appearing drainage from the skin. A bandage/dressing was placed and post-aspiration care instructions were discussed with the patient. There were no apparent complications.     IMPRESSION: Uncomplicated ultrasound-guided right breast abscess / infected galactocele aspiration. Recommend continued clinical follow-up with completion of antibiotics course, primary follow-up, and return to the breast center in approximately 1 week for repeat right breast ultrasound. Earlier return precautions were given. Procedure performed by Dr. Correia under my supervision.  I, Houston Branch MD, was present for the entire procedure.critical portion of the procedure and was immediately available. I have personally reviewed the examination and initial interpretation and I agree with the findings. HOUSTON BRANCH MD   SYSTEM ID:  [id number]

## 2023-10-18 NOTE — LACTATION NOTE
"Brief Lactation Consult    Followed up with mother on mastitis management plan. Mother reports she had long visit earlier today with Kitty and feels comfortable with pumping plan.   Mother shares that she has been breastfeeding briefly (about 5 minutes) to keep baby breast focused but is primarily pumping and bottle feeding. Per mother, this recommendation came from baby's pediatrician as baby was not gaining adequate weight.   LC briefly reviews mastitis management again and encourages mother to call with questions or concerns.       Education:   From [employer] Clinical Protocol #36:  The Mastitis Spectrum, Revised 2022  MASTITIS CARE PLAN:    Continue your regular pumping routine; do not aim to empty breasts more than usual.    Consider ice for symptomatic relief    Gentle massage of lactating breasts. Gentle compression during pumping (\"hands-on pumping\") may be effective if excessive force avoided.  Avoid deep massage as this can increase inflammation, swelling and injury. Avoid vibrating or massaging devices.    Heat to breast if desired, if does not worsen symptoms and provides comfort    Alert lactation consultant if your nipples have cracks or sores    You may directly breastfeed on affected breast and feed milk that has been pumped. Please check with your baby's provider regarding feeding your baby milk that was frozen during your antibiotic treatment.     Wear a supportive, properly fitting bra that does not put pressure on your breasts (avoid tight bras, purse straps, slings/ wraps).     Be aware affected breast may make less milk for a time while it recovers.      Take antibiotics as directed for the full number of days prescribed.      Take pain medicine as needed according to your provider's instructions. This can help with inflammation as well as pain.       Discuss with your provider use of a lecithin supplement, 5000 - 10,000 mg daily to help with milk flow and decrease ductal " inflammation (see handout)    Please see your provider for additional assessment and care if you are not improving after 24-48 hours on your prescribed antibiotic.    REST AND SLEEP!  Do not do anything but pump, rest, eat well and stay hydrated.         Halley Castañeda RN, IBCLC   Lactation Consultant  Ascom: [phone]  Office: [phone]

## 2023-10-18 NOTE — PROGRESS NOTES
VSS.  Afrebile.  Localized tenderness and redness persist on R breast - above nipple.  Marked by MD.  No changes reported by the pt.  Receiving IV abxs to tx the mastitis/abscess.  Pt reports pumping q 3 hrs during the day and q 3-4 hrs overnight.  Graham, son, is breast feeding - sometimes per Zara today.  Ej is bottle feeding Graham.  Pt will contact LC if she has any questions for them.  Taking motrin and tylenol for pain relief.  Heat/ice avail prn.  Call light is within reach.

## 2023-10-18 NOTE — PHARMACY-VANCOMYCIN DOSING SERVICE
Pharmacy Vancomycin Note  Date of Service 2023  Patient's  1990   33 year old, female    Indication: Abscess  Day of Therapy: 2, initiated 10/16  Current vancomycin regimen:  1000 mg IV q12h  Current vancomycin monitoring method: AUC  Current vancomycin therapeutic monitoring goal: 400-600 mg*h/L    InsightRX Prediction of Current Vancomycin Regimen  Regimen: 1000 mg IV every 12 hours.  Start time: 10:24 on 10/18/2023  Exposure target: AUC24 (range)400-600 mg/L.hr   AUC24,ss: 420 mg/L.hr  Probability of AUC24 > 400: 60 %  Ctrough,ss: 12.7 mg/L  Probability of Ctrough,ss > 20: 5 %  Probability of nephrotoxicity (Lodise STEPHANIE ): 8 %      Current estimated CrCl = Estimated Creatinine Clearance: 103.1 mL/min (based on SCr of 0.86 mg/dL).    Creatinine for last 3 days  10/16/2023:  8:03 PM Creatinine 0.82 mg/dL  10/18/2023:  8:21 AM Creatinine 0.86 mg/dL    Recent Vancomycin Levels (past 3 days)  10/18/2023:  8:21 AM Vancomycin 11.1 ug/mL    Vancomycin IV Administrations (past 72 hours)                     vancomycin (VANCOCIN) 1,000 mg in 200 mL dextrose intermittent infusion (mg) 1,000 mg New Bag 10/17/23 2208     1,000 mg New Bag  1103    vancomycin (VANCOCIN) 1,250 mg in 0.9% NaCl 250 mL intermittent infusion (mg) 1,250 mg New Bag 10/16/23 2227                    Nephrotoxins and other renal medications (From now, onward)      Start     Dose/Rate Route Frequency Ordered Stop    10/17/23 1030  vancomycin (VANCOCIN) 1,000 mg in 200 mL dextrose intermittent infusion         1,000 mg  200 mL/hr over 1 Hours Intravenous EVERY 12 HOURS 10/16/23 2055      10/16/23 1929  ibuprofen (ADVIL/MOTRIN) tablet 600 mg         600 mg Oral EVERY 6 HOURS PRN 10/16/23 192                 Contrast Orders - past 72 hours (72h ago, onward)      None            Interpretation of levels and current regimen:  Vancomycin level is reflective of -600    Has serum creatinine changed greater than 50% in last 72 hours:  No    Urine output:  good urine output    Renal Function: Stable      Plan:  Continue Current Dose  Vancomycin monitoring method: AUC  Vancomycin therapeutic monitoring goal: 400-600 mg*h/L  Pharmacy will check vancomycin levels as appropriate in 3-5 Days.  Serum creatinine levels will be ordered daily for the first week of therapy and at least twice weekly for subsequent weeks.    Marlin Quinones, PharmD - Pediatric Clinical Pharmacist

## 2023-10-18 NOTE — PLAN OF CARE
"Goal Outcome Evaluation:      Plan of Care Reviewed With: patient    Overall Patient Progress: improvingOverall Patient Progress: improving    Outcome Evaluation: VSS.  Assessments WNL except for abcess on R breast - above nipple.  outlined by MD yesterday- no change.  Pt is afrebile.  receiving IV abxs.  breast feeding and pumping after each feed q 3-4 hours.  Ej, spouse, is feeding Graham via bottle expressed milk.  localized tenderness if R breast abcess area touched per pt - about \"3\". seen by Dr Lopez this morning.  comfortable with tylenol and motrin.  ice/heat avail for use prn.  pt voiding, eating, drinking, ambulating without difficulty.  had c/s on 9/10.  here for mastitis and IV abx tx.  call light is within reach.      "

## 2023-10-18 NOTE — PROVIDER NOTIFICATION
10/18/23 1801   Provider Notification   Provider Name/Title Dr Luna/Corinna   Method of Notification Electronic Page     Updated that LR was hung - as fulfillment order during ancef administration.  Dr Calvo called back to approve the order.

## 2023-10-18 NOTE — PROGRESS NOTES
[facility]  Gynecology Progress Note    S:  Zara is feeling well this morning. Feels like her breast pain has improved, but is slightly increased during our interview as she had just pumped. Adequately managed with tylenol and ibuprofen, uses ice occasionally. She feels as if the redness has decreased. Denies fevers/chills. Still trying to pump every 3 hours on the right breast, is also breastfeeding on both breasts. Has seen lactation twice. Tolerating PO without nausea/vomiting.      O:  Patient Vitals for the past 24 hrs:   BP Temp Temp src Pulse Resp   10/19/23 0743 128/78 97.6  F (36.4  C) Oral 68 16   10/19/23 0041 103/52 97.5  F (36.4  C) Oral 69 16   10/18/23 1651 131/66 97.9  F (36.6  C) Oral 85 16   10/18/23 0836 117/84 97.6  F (36.4  C) Oral 58 16     Gen: Resting comfortably, NAD  CV: Warm, well perfused  Pulm: No increased work of breathing  Breast:  Right breast with erythematous area that is firm to touch at 11 o'clock. Small area that is firm around 4 o'clock. No signs of skin break down or nipple involvement. Nipple normal in appearance without signs of drainage. Erythema decreased from thin line from yesterday. Induration decreased, within thin line at this point from thick line yesterday.      Hemoglobin   Date Value Ref Range Status   10/18/2023 10.9 (L) 11.7 - 15.7 g/dL Final   04/15/2010 12.9 11.7 - 15.7 g/dL Final   ]    A/P:  Zara aKminski is a 33 year old  PPD#39 s/p PLTCS with h/o mastitis, trauma and stressor related disorder, and ADHD who presents for worsening symptoms of mastitis. Based on previous breast aspirate, prior trialed oral antibiotics should have appropriately covered organisms based on culture sensitivities, patient was being treated with IV antibiotics and an appropriate clinical response was seen. Transitioning to PO antibiotics to assess efficacy in preparation for discharge. No concerns for increasing infection given downtrending and  WNL WBC, downtrending CRP, and improving physical exam findings.     Mastitis and breast abscess:  - Continue inpatient admission  - S/p 2D ceftriaxone and vancomycin. Per pharmacy recs, discontinued vanc and transitioned to Ancef 2g q8h, now D#2 of Ancef. IV antibiotics have been working for 48 hours with noted improvement.  - Plan to transition back to PO Keflex this morning  - S/p drainage x 2 (10/16, 10/11)   - If worsens or fails to continue to improve, consider breast surgery consult.  - CBC shows normal WBC count 9.0>6.9>6.6>pending, CRP 11>9>9>pending  - Ibuprofen, Tylenol q6h for discomfort, treating appropriately  - Lactation team consulted, pt comfortable with plan of care  - Continue to pump/feed from R breast as able     Postpartum  - Routine postpartum cares    Elyssa Velasquez, MS3    RESIDENT WITH STUDENT: I saw the patient with the medical student on the date of the medical student's note and performed, or re-performed, the physical exam and medical decision-making in the provision of this service and have verified the accuracy of all the medical student documentation and edited as necessary.     Dashawn Swann MD  Obstetrics and Gynecology, PGY-1  10/19/23 8:17 AM    Staff note:  Patient seen and examined by me today ~ 1600.  Erythema is below both lines but there is firm induration measuring 4 cm in diameter over the areola and nipple of the right breast. She was transitioned to ancef yesterday and has not yet completed  24 hours of ancef.  Due to the dense induration and erythema that persists, I do not feel that she is ready to transition to oral abx. Plan to re-image the breast to see if there is an abscess that would benefit from I and D to allow full and rapid recovery.   Continue IV abx for today.     Thais Summers MD

## 2023-10-18 NOTE — PLAN OF CARE
Goal Outcome Evaluation:      Plan of Care Reviewed With: patient    Overall Patient Progress: improvingOverall Patient Progress: improving       Shift: [id number]: Shift handoff report received from KINJAL Ferraro.    Data: Postpartum vaginal delivery from September, readmitted for mastitis on IV antibiotic therapy. Zara Kaminski tolerates IV antibiotic infusions well. Vital signs stable, up ad carmen, eating and drinking without nausea. Left breast appears normal,  no signs or symptoms of infection.  Right breast has marked reddened area, firm, warm to touch. Breastfeeding infant and Pumping with no assistance. Taking ibuprofen and acetaminophen for right breast pain and reports that the baseline pain level of mastitis area is reducing.   Action: Education provided on pain control, medication management for pain and infection, alternative therapies for pain, continuation of breastfeeding and pumping, and plan of care  Response: Pt is agreeable with her plan of care. Positive attachment behaviors observed with infant. Support person, present. Continue with current plan of care.       Jane Hagen RN on 10/18/2023 at 5:18 AM

## 2023-10-19 LAB
CRP SERPL-MCNC: 6.57 MG/L
ERYTHROCYTE [DISTWIDTH] IN BLOOD BY AUTOMATED COUNT: 13.7 % (ref 10–15)
HCT VFR BLD AUTO: 36.2 % (ref 35–47)
HGB BLD-MCNC: 11.5 G/DL (ref 11.7–15.7)
MCH RBC QN AUTO: 27.8 PG (ref 26.5–33)
MCHC RBC AUTO-ENTMCNC: 31.8 G/DL (ref 31.5–36.5)
MCV RBC AUTO: 88 FL (ref 78–100)
PLATELET # BLD AUTO: 348 10E3/UL (ref 150–450)
RBC # BLD AUTO: 4.13 10E6/UL (ref 3.8–5.2)
WBC # BLD AUTO: 6.6 10E3/UL (ref 4–11)

## 2023-10-19 PROCEDURE — 36415 COLL VENOUS BLD VENIPUNCTURE: CPT | Performed by: STUDENT IN AN ORGANIZED HEALTH CARE EDUCATION/TRAINING PROGRAM

## 2023-10-19 PROCEDURE — 250N000011 HC RX IP 250 OP 636: Mod: JZ | Performed by: STUDENT IN AN ORGANIZED HEALTH CARE EDUCATION/TRAINING PROGRAM

## 2023-10-19 PROCEDURE — 86140 C-REACTIVE PROTEIN: CPT | Performed by: STUDENT IN AN ORGANIZED HEALTH CARE EDUCATION/TRAINING PROGRAM

## 2023-10-19 PROCEDURE — 250N000013 HC RX MED GY IP 250 OP 250 PS 637

## 2023-10-19 PROCEDURE — 85027 COMPLETE CBC AUTOMATED: CPT | Performed by: STUDENT IN AN ORGANIZED HEALTH CARE EDUCATION/TRAINING PROGRAM

## 2023-10-19 PROCEDURE — 120N000002 HC R&B MED SURG/OB UMMC

## 2023-10-19 RX ADMIN — CEFAZOLIN SODIUM 2 G: 2 INJECTION, SOLUTION INTRAVENOUS at 09:28

## 2023-10-19 RX ADMIN — ACETAMINOPHEN 650 MG: 325 TABLET, FILM COATED ORAL at 07:25

## 2023-10-19 RX ADMIN — ACETAMINOPHEN 650 MG: 325 TABLET, FILM COATED ORAL at 22:11

## 2023-10-19 RX ADMIN — IBUPROFEN 600 MG: 200 TABLET, FILM COATED ORAL at 15:16

## 2023-10-19 RX ADMIN — IBUPROFEN 600 MG: 200 TABLET, FILM COATED ORAL at 07:48

## 2023-10-19 RX ADMIN — CEFAZOLIN SODIUM 2 G: 2 INJECTION, SOLUTION INTRAVENOUS at 17:47

## 2023-10-19 RX ADMIN — CEFAZOLIN SODIUM 2 G: 2 INJECTION, SOLUTION INTRAVENOUS at 00:48

## 2023-10-19 RX ADMIN — IBUPROFEN 600 MG: 200 TABLET, FILM COATED ORAL at 22:11

## 2023-10-19 ASSESSMENT — ACTIVITIES OF DAILY LIVING (ADL)
ADLS_ACUITY_SCORE: 35

## 2023-10-19 NOTE — PLAN OF CARE
Goal Outcome Evaluation:      Plan of Care Reviewed With: patient    Overall Patient Progress: no changeOverall Patient Progress: no change    Outcome Evaluation: VSS.  Assessments are normal except for abcess and redness on R breast above nipple.  redness has slightly decreased based on marking.  tenderness and abcess present.  pumping q 3 hrs.  motrin and tylenol given prn.  pt at times places ice on breast.  Ej and Graham are present.  receiving Ancef 2 gm IV.  seen by Dr Summers this afternoon.  LC in to clarify/answer questions.  call light is within reach.

## 2023-10-19 NOTE — PLAN OF CARE
Data: VSS, postpartum assessments WNL. Rt breast redness and induration marked. Pt afebrile and on Ancef. Pt is voiding without difficulty, up ad carmen, eating and drinking without nausea. Incision appears to have healed well, lochia WNL, no s/s infection. Pumping with min assistance and father feeding baby. Taking tylenol and ibuprofen for pain and pt reports adequate pain management.   Action: Education provided on discharge goals, plan of care, pain management, see record.  Response: Pt is agreeable with her plan of care. Pt is independent providing  cares and is attentive to infant needs. Support person,  spouse, present. Will continue with plan of care and notify provider of any changes in status.     Dad taking care of  the baby through out the night and mother helping while awake.

## 2023-10-20 ENCOUNTER — APPOINTMENT (OUTPATIENT)
Dept: ULTRASOUND IMAGING | Facility: CLINIC | Age: 33
End: 2023-10-20
Payer: COMMERCIAL

## 2023-10-20 ENCOUNTER — APPOINTMENT (OUTPATIENT)
Dept: INTERVENTIONAL RADIOLOGY/VASCULAR | Facility: CLINIC | Age: 33
End: 2023-10-20
Attending: PHYSICIAN ASSISTANT
Payer: COMMERCIAL

## 2023-10-20 LAB
CRP SERPL-MCNC: 5.14 MG/L
ERYTHROCYTE [DISTWIDTH] IN BLOOD BY AUTOMATED COUNT: 13.6 % (ref 10–15)
HCT VFR BLD AUTO: 34.9 % (ref 35–47)
HGB BLD-MCNC: 11.1 G/DL (ref 11.7–15.7)
MCH RBC QN AUTO: 28.2 PG (ref 26.5–33)
MCHC RBC AUTO-ENTMCNC: 31.8 G/DL (ref 31.5–36.5)
MCV RBC AUTO: 89 FL (ref 78–100)
PLATELET # BLD AUTO: 324 10E3/UL (ref 150–450)
RBC # BLD AUTO: 3.93 10E6/UL (ref 3.8–5.2)
WBC # BLD AUTO: 5 10E3/UL (ref 4–11)

## 2023-10-20 PROCEDURE — 272N000500 HC NEEDLE CR2

## 2023-10-20 PROCEDURE — 76942 ECHO GUIDE FOR BIOPSY: CPT

## 2023-10-20 PROCEDURE — 99231 SBSQ HOSP IP/OBS SF/LOW 25: CPT | Mod: 24 | Performed by: OBSTETRICS & GYNECOLOGY

## 2023-10-20 PROCEDURE — 10160 PNXR ASPIR ABSC HMTMA BULLA: CPT

## 2023-10-20 PROCEDURE — 250N000013 HC RX MED GY IP 250 OP 250 PS 637

## 2023-10-20 PROCEDURE — 36415 COLL VENOUS BLD VENIPUNCTURE: CPT

## 2023-10-20 PROCEDURE — 87077 CULTURE AEROBIC IDENTIFY: CPT

## 2023-10-20 PROCEDURE — 0H9T3ZZ DRAINAGE OF RIGHT BREAST, PERCUTANEOUS APPROACH: ICD-10-PCS | Performed by: RADIOLOGY

## 2023-10-20 PROCEDURE — 250N000011 HC RX IP 250 OP 636: Mod: JZ | Performed by: STUDENT IN AN ORGANIZED HEALTH CARE EDUCATION/TRAINING PROGRAM

## 2023-10-20 PROCEDURE — 76942 ECHO GUIDE FOR BIOPSY: CPT | Mod: 26 | Performed by: RADIOLOGY

## 2023-10-20 PROCEDURE — 250N000013 HC RX MED GY IP 250 OP 250 PS 637: Performed by: OBSTETRICS & GYNECOLOGY

## 2023-10-20 PROCEDURE — 250N000009 HC RX 250: Performed by: RADIOLOGY

## 2023-10-20 PROCEDURE — 86140 C-REACTIVE PROTEIN: CPT

## 2023-10-20 PROCEDURE — 76642 ULTRASOUND BREAST LIMITED: CPT | Mod: RT

## 2023-10-20 PROCEDURE — 76642 ULTRASOUND BREAST LIMITED: CPT | Mod: 26 | Performed by: RADIOLOGY

## 2023-10-20 PROCEDURE — 258N000003 HC RX IP 258 OP 636

## 2023-10-20 PROCEDURE — 85027 COMPLETE CBC AUTOMATED: CPT

## 2023-10-20 PROCEDURE — 120N000002 HC R&B MED SURG/OB UMMC

## 2023-10-20 PROCEDURE — 10160 PNXR ASPIR ABSC HMTMA BULLA: CPT | Performed by: RADIOLOGY

## 2023-10-20 PROCEDURE — 87075 CULTR BACTERIA EXCEPT BLOOD: CPT

## 2023-10-20 RX ORDER — LIDOCAINE HYDROCHLORIDE 10 MG/ML
.5-1 INJECTION, SOLUTION EPIDURAL; INFILTRATION; INTRACAUDAL; PERINEURAL ONCE
Status: COMPLETED | OUTPATIENT
Start: 2023-10-20 | End: 2023-10-20

## 2023-10-20 RX ORDER — NALOXONE HYDROCHLORIDE 1 MG/ML
0.4 INJECTION INTRAMUSCULAR; INTRAVENOUS; SUBCUTANEOUS
Status: DISCONTINUED | OUTPATIENT
Start: 2023-10-20 | End: 2023-10-21 | Stop reason: HOSPADM

## 2023-10-20 RX ORDER — NALOXONE HYDROCHLORIDE 1 MG/ML
0.2 INJECTION INTRAMUSCULAR; INTRAVENOUS; SUBCUTANEOUS
Status: DISCONTINUED | OUTPATIENT
Start: 2023-10-20 | End: 2023-10-21 | Stop reason: HOSPADM

## 2023-10-20 RX ORDER — OXYCODONE HYDROCHLORIDE 5 MG/1
5 TABLET ORAL EVERY 6 HOURS PRN
Status: DISCONTINUED | OUTPATIENT
Start: 2023-10-20 | End: 2023-10-21 | Stop reason: HOSPADM

## 2023-10-20 RX ORDER — SODIUM CHLORIDE 9 MG/ML
INJECTION, SOLUTION INTRAVENOUS
Status: COMPLETED
Start: 2023-10-20 | End: 2023-10-20

## 2023-10-20 RX ADMIN — IBUPROFEN 600 MG: 200 TABLET, FILM COATED ORAL at 23:24

## 2023-10-20 RX ADMIN — IBUPROFEN 600 MG: 200 TABLET, FILM COATED ORAL at 10:57

## 2023-10-20 RX ADMIN — CEFAZOLIN SODIUM 2 G: 2 INJECTION, SOLUTION INTRAVENOUS at 18:16

## 2023-10-20 RX ADMIN — IBUPROFEN 600 MG: 200 TABLET, FILM COATED ORAL at 17:12

## 2023-10-20 RX ADMIN — ACETAMINOPHEN 650 MG: 325 TABLET, FILM COATED ORAL at 17:12

## 2023-10-20 RX ADMIN — IBUPROFEN 600 MG: 200 TABLET, FILM COATED ORAL at 05:17

## 2023-10-20 RX ADMIN — LIDOCAINE HYDROCHLORIDE 3 ML: 10 INJECTION, SOLUTION EPIDURAL; INFILTRATION; INTRACAUDAL; PERINEURAL at 16:56

## 2023-10-20 RX ADMIN — CEFAZOLIN SODIUM 2 G: 2 INJECTION, SOLUTION INTRAVENOUS at 02:24

## 2023-10-20 RX ADMIN — ACETAMINOPHEN 650 MG: 325 TABLET, FILM COATED ORAL at 23:24

## 2023-10-20 RX ADMIN — CEFAZOLIN SODIUM 2 G: 2 INJECTION, SOLUTION INTRAVENOUS at 10:46

## 2023-10-20 RX ADMIN — ACETAMINOPHEN 650 MG: 325 TABLET, FILM COATED ORAL at 05:17

## 2023-10-20 RX ADMIN — OXYCODONE HYDROCHLORIDE 5 MG: 5 TABLET ORAL at 22:03

## 2023-10-20 RX ADMIN — ACETAMINOPHEN 650 MG: 325 TABLET, FILM COATED ORAL at 10:57

## 2023-10-20 RX ADMIN — SODIUM CHLORIDE 1000 ML: 9 INJECTION, SOLUTION INTRAVENOUS at 10:46

## 2023-10-20 ASSESSMENT — ACTIVITIES OF DAILY LIVING (ADL)
ADLS_ACUITY_SCORE: 35

## 2023-10-20 NOTE — PROGRESS NOTES
[facility]  Gynecology Progress Note    S:  Zara is feeling well this morning. Feels like her breast pain has not changed and the indurated portion continues to be tender. Adequately managed with tylenol and ibuprofen, uses ice occasionally. She doesn't feel the size of the induration or the erythema has changed. Denies fevers/chills. Still trying to pump every 3 hours on the right breast, is also breastfeeding on both breasts. Tolerating PO without nausea/vomiting.      O:  Patient Vitals for the past 24 hrs:   BP Temp Temp src Pulse Resp   10/20/23 0517 117/73 97.4  F (36.3  C) Oral 79 16   10/19/23 2030 130/78 98  F (36.7  C) Oral 67 16   10/19/23 1621 123/80 97.9  F (36.6  C) Oral 64 16   10/19/23 0743 128/78 97.6  F (36.4  C) Oral 68 16     Gen: Resting comfortably, NAD  CV: Warm, well perfused  Pulm: No increased work of breathing  Breast:  Right breast with erythematous area that is firm to touch at 11 o'clock around areola and nipple. Small area that is firm around 4 o'clock. No signs of skin break down or nipple involvement. Nipple normal in appearance without signs of drainage. Erythema and induration unchanged from yesterday.      Hemoglobin   Date Value Ref Range Status   10/19/2023 11.5 (L) 11.7 - 15.7 g/dL Final   04/15/2010 12.9 11.7 - 15.7 g/dL Final   ]    A/P:  Zara Kaminski is a 33 year old  PPD#40 s/p PLTCS with h/o mastitis, trauma and stressor related disorder, and ADHD who presents for worsening symptoms of mastitis. Based on previous breast aspirate, prior trialed oral antibiotics should have appropriately covered organisms based on culture sensitivities, patient was being treated with IV antibiotics and an appropriate clinical response was seen. Continuing IV antibiotics and getting breast ultrasound today for assessment of abscess. No concerns for increasing infection given downtrending and WNL WBC, downtrending CRP, and stable physical exam findings.      Mastitis and breast abscess:  - Continue inpatient admission  - S/p 2D ceftriaxone and vancomycin. Per pharmacy recs, discontinued vanc and transitioned to Ancef 2g q8h, now D#3 of Ancef. IV antibiotics have been working for 72 hours with noted improvement over the first 48 hours, and no change over the past 24.  - Reimage breast this am to reassess for abscess that may be amenable to an I&D.   - S/p drainage x 2 (10/16, 10/11)   - Consider breast surgery consult  - CBC shows normal WBC count 9.0>6.9>6.6>6.6, CRP 11>9>9>6  - Ibuprofen, Tylenol q6h for discomfort, treating appropriately  - Lactation team consulted, pt comfortable with plan of care  - Continue to pump/feed from R breast as able     Postpartum  - Routine postpartum cares    Dashawn Swann MD  Obstetrics and Gynecology, PGY-1  10/20/23 6:46 AM

## 2023-10-20 NOTE — PLAN OF CARE
Goal Outcome Evaluation:  Data: Vital signs within normal limits. Postpartum checks within normal limits - see flow record. Patient eating and drinking normally. Patient able to empty bladder independently and is up ambulating. No apparent signs of infection. Left PIV replaced due to swelling and tenderness at IV site, new IV (right PIV) infusing well with no discomfort. Incision dressing from right breast aspiration procedure is clean, dry, and intact. Patient performing self cares and is breastfeeding for infant son who is here with patients .   Action: Pain has been managed with oral pain medications and ice packs, pain/tenderness is mostly from right breast mass. IV antibiotics administered per orders.   Response: Infant son is present along with  Ej.  Plan: Continue with the plan of care.

## 2023-10-20 NOTE — PROVIDER NOTIFICATION
10/20/23 1624   Provider Notification   Provider Name/Title Dr. Luna (G2)   Method of Notification Electronic Page   Request Evaluate-Remote   Notification Reason Vital Signs Change     Recent BP was 143/104, pt had been talking to surgery Dr about surgical procedure for breast abscess so was feeling anxious. Will recheck BP when pt gets back from aspiration procedure. Thank you.

## 2023-10-20 NOTE — LACTATION NOTE
Followed up with Zara today. She feels that her right breast has minimally improved. Redness has decreased somewhat from previous markings but it is .   She has been breastfeeding on the left breast intermittently and pumping every 3 hours. She was curious about the baby's weight gain so offered to do a weighted feeding. Pre-feed weight was 5080 grams. Assisted Zara in achieving a deep, asymmetric latch without the nipple shield. Breast compressions performed during the feeding to increase milk transfer and encourage nutritive sucking. He fed for about 10 minutes and transferred 32 ml. Nipple shield was placed and baby brought back to breast and fed for another 5 minutes but only a few intermittent swallows heard. No transfer per scale. Graham was then supplemented with 60 ml of expressed maternal milk and then Zara pumped. Encourage working with lactation for another weighted feeding tomorrow to continue to improve effective milk transfer.      Ellen Pearson RN, ARANZA   Lactation Consultant  Ascom: *[phone]  Office: [phone]

## 2023-10-20 NOTE — LACTATION NOTE
Brief Lactation Consult    Zara shares she is feeling ok today. She is waiting to hear the results of her ultrasound for a plan but she believes they will attempt to drain her abscess again this afternoon.    Zara is has been mostly pumping and bottle feeding and is breastfeeding a few times per day.  She just finished bottle feeding. Encouraged her to call with latch support/weighted feeding this afternoon if she is interested.     They are using a nipple shield and supplementing based on a plan from their pediatrician before admission.    Zara is considering a [employer] rental at discharge. She will check with her insurance for coverage.       Plan: Will continue to check in while inpatient. Parents encouraged to call with questions or for feeding support as needed.       Mimi Escalona RN, IBCLC   Lactation Consultant  Ascom: *[phone]  Office: [phone]

## 2023-10-20 NOTE — PLAN OF CARE
Goal Outcome Evaluation:      Plan of Care Reviewed With: patient     Overall Patient Progress: Progressing     Outcome Evaluation: VSS.  Assessments are normal except for abcess and redness on R breast above nipple.  redness has slightly decreased based on marking.  tenderness and abcess present.  pumping q 3 hrs.  motrin and tylenol given prn.  pt at times places ice on breast.   and son remain at bedside.  Receiving Ancef 2 gm IV q8 hours. Pt verbalizes able to sleep overnight.

## 2023-10-20 NOTE — CONSULTS
"    Interventional Radiology  [facility] Consult Note  10/20/23   3:29 PM    Consult Requested: \"Persistent breast abscess 2/2 mastitis\"    Recommendations/Plan:  -Patient is on IR schedule today for a US-guided right breast abscess aspiration.   -Labs WNL for procedure.  -Orders entered for procedure. No NPO required, will use local anesthesia. No pre procedure IV antibiotics required.  -Medications to be held include: none.  -Consent will be done prior to procedure.     Case and imaging discussed with IR attending, Dr. Sherman. Recommendations were reviewed with requesting provider, Dr. Nubia Quijano.    This is a 33 year old female  who is now HD#5 with mastitis and breast abscess, s/p aspiration x 2 with minimal fluid return on 10/16 and 10/11 (previous aspirations have been done at an outpatient breast imaging center). Fluid was dictated as cloudy tan/milky in appearance. She has continued on IV antibiotics (on Ancef currently which is sensitive based on culture results from her prior aspirate). She has mastitis for the past 3 weeks. Imaging reveals persistent right breast abscess despite prior aspirations and antibiotic treatment.     Pertinent Imaging Reviewed: US-imaging from today    Expected date of discharge:  TBD    Vitals:   /67 (BP Location: Left arm, Patient Position: Semi-Soria's, Cuff Size: Adult Regular)   Pulse 72   Temp 98.1  F (36.7  C) (Oral)   Resp 16   LMP 2022     Pertinent Labs:   Lab Results   Component Value Date    WBC 5.0 10/20/2023    WBC 6.6 10/19/2023    WBC 6.6 10/18/2023    WBC 7.2 04/15/2010     Lab Results   Component Value Date    HGB 11.1 10/20/2023    HGB 11.5 10/19/2023    HGB 10.9 10/18/2023    HGB 12.9 04/15/2010     Lab Results   Component Value Date     10/20/2023     10/19/2023     10/18/2023     04/15/2010     No results found for: \"INR\", \"PTT\"  Lab Results   Component Value Date    POTASSIUM 3.6 04/15/2010    "     COVID-19 Antibody Results, Testing for Immunity         No data to display            COVID-19 PCR Results        9/9/2023    08:55   COVID-19 PCR Results   SARS CoV2 PCR Negative        Miriam Patricio PA-C  Interventional Radiology  Pager: [phone]

## 2023-10-20 NOTE — PROCEDURES
[facility] Brief Procedure Note    Pre-operative diagnosis: Right sided mastitis with breast abscess   Post-operative diagnosis Same   Procedure: Ultrasound-guided right breast abscess aspiration   Surgeon: Zabrina Sherman MD   Assistants(s): -   Estimated blood loss: None        Findings: Successful ultrasound-guided right breast abscess aspiration performed.  Fluid was extremely thick and could not be obtained through a straight 5 Italian Yueh catheter, therefore we switched to a pigtail 5 Italian Yueh catheter and 22 cc of thick pus was aspirated.  The abscess is now decompressed with small phlegmon remaining.   Complications: None.

## 2023-10-21 VITALS
SYSTOLIC BLOOD PRESSURE: 106 MMHG | HEART RATE: 57 BPM | DIASTOLIC BLOOD PRESSURE: 78 MMHG | OXYGEN SATURATION: 98 % | TEMPERATURE: 97.5 F | RESPIRATION RATE: 14 BRPM

## 2023-10-21 LAB
CRP SERPL-MCNC: 4.27 MG/L
ERYTHROCYTE [DISTWIDTH] IN BLOOD BY AUTOMATED COUNT: 13.8 % (ref 10–15)
HCT VFR BLD AUTO: 34.2 % (ref 35–47)
HGB BLD-MCNC: 10.8 G/DL (ref 11.7–15.7)
MCH RBC QN AUTO: 28 PG (ref 26.5–33)
MCHC RBC AUTO-ENTMCNC: 31.6 G/DL (ref 31.5–36.5)
MCV RBC AUTO: 89 FL (ref 78–100)
PLATELET # BLD AUTO: 326 10E3/UL (ref 150–450)
RBC # BLD AUTO: 3.86 10E6/UL (ref 3.8–5.2)
WBC # BLD AUTO: 5 10E3/UL (ref 4–11)

## 2023-10-21 PROCEDURE — 86140 C-REACTIVE PROTEIN: CPT

## 2023-10-21 PROCEDURE — 85027 COMPLETE CBC AUTOMATED: CPT

## 2023-10-21 PROCEDURE — 250N000013 HC RX MED GY IP 250 OP 250 PS 637

## 2023-10-21 PROCEDURE — 250N000011 HC RX IP 250 OP 636: Mod: JZ | Performed by: STUDENT IN AN ORGANIZED HEALTH CARE EDUCATION/TRAINING PROGRAM

## 2023-10-21 PROCEDURE — 99238 HOSP IP/OBS DSCHRG MGMT 30/<: CPT | Mod: 24 | Performed by: OBSTETRICS & GYNECOLOGY

## 2023-10-21 PROCEDURE — 36415 COLL VENOUS BLD VENIPUNCTURE: CPT

## 2023-10-21 RX ORDER — SULFAMETHOXAZOLE/TRIMETHOPRIM 800-160 MG
1 TABLET ORAL 2 TIMES DAILY
Qty: 28 TABLET | Refills: 0 | Status: SHIPPED | OUTPATIENT
Start: 2023-10-21 | End: 2023-11-03

## 2023-10-21 RX ORDER — ACETAMINOPHEN 325 MG/1
650 TABLET ORAL EVERY 6 HOURS PRN
Qty: 100 TABLET | Refills: 0 | Status: SHIPPED | OUTPATIENT
Start: 2023-10-21

## 2023-10-21 RX ORDER — IBUPROFEN 600 MG/1
600 TABLET, FILM COATED ORAL EVERY 6 HOURS PRN
Qty: 30 TABLET | Refills: 0 | Status: SHIPPED | OUTPATIENT
Start: 2023-10-21

## 2023-10-21 RX ADMIN — ACETAMINOPHEN 650 MG: 325 TABLET, FILM COATED ORAL at 13:04

## 2023-10-21 RX ADMIN — CEFAZOLIN SODIUM 2 G: 2 INJECTION, SOLUTION INTRAVENOUS at 10:40

## 2023-10-21 RX ADMIN — CEFAZOLIN SODIUM 2 G: 2 INJECTION, SOLUTION INTRAVENOUS at 02:24

## 2023-10-21 RX ADMIN — IBUPROFEN 600 MG: 200 TABLET, FILM COATED ORAL at 06:54

## 2023-10-21 RX ADMIN — ACETAMINOPHEN 650 MG: 325 TABLET, FILM COATED ORAL at 06:54

## 2023-10-21 RX ADMIN — IBUPROFEN 600 MG: 200 TABLET, FILM COATED ORAL at 13:04

## 2023-10-21 ASSESSMENT — ACTIVITIES OF DAILY LIVING (ADL)
ADLS_ACUITY_SCORE: 35
ADLS_ACUITY_SCORE: 35
ADLS_ACUITY_SCORE: 20
ADLS_ACUITY_SCORE: 35
ADLS_ACUITY_SCORE: 35

## 2023-10-21 NOTE — PLAN OF CARE
Goal Outcome Evaluation:         Shift [id number]    VSS. Taking tylenol and motrin as needed, with PRN oxycodone ordered. Bonding well with infant in room, FOB advised to stay with infant for safety reasons. Patient is independently breast feeding from the L and pumping from the R. Complaints of minimal pain from incision site during shift, bandage clean, dry and intact. Plan to continue IV abx and switch to oral in AM.

## 2023-10-21 NOTE — CONSULTS
I saw Zara Kaminski today for evaluation of a right sided breast abscess in the setting of breast feeding.  She is about one month post partum.  She has been admitted this past week with mastitis and a relatively large fluid collection (5cm).  Never had similar symptoms.  Mother had uncomplicated mastitis, no other family history of breast disease.  Nonsmoker, not diabetic.    Interventional Radiology has tried to aspirate the collection x2 without success.      PE:  /76 (BP Location: Left arm, Patient Position: Sitting, Cuff Size: Adult Regular)   Pulse 59   Temp 97.9  F (36.6  C) (Oral)   Resp 14   LMP 12/01/2022   SpO2 98%     Alert, pleasant  RRR  No resp distress  Right breast with erythema along the superior aspect of the breast with some mild skin thinning just superior to the nipple complex.  I can palpate a large abscess within the central breast.    I reviewed her imaging.    A/P  Persistent breast abscess. I discussed with her the role of surgical drainage and its risks (scarring, milk fistula, inadequate drainage, etc).    After I met with her, she underwent a successful aspiration of the abscess in IR.    Given the drainage- will hold off on surgical drainage.  If the collection re accumulates- would attempt repeat IR drainage.

## 2023-10-21 NOTE — PLAN OF CARE
Goal Outcome Evaluation:       VSS. R breast continues to have hardened tender spot but improving per pt. EDS of 7. Continued IV abx prior to discharge. Reviewed AVS, need for follow up and discharge medications with patient. Discharged to home.

## 2023-10-21 NOTE — DISCHARGE INSTRUCTIONS
Mastitis    Call your doctor right away if you have any of the following:  - A fever above 100.4 F (38 C), with or without chills.  - Fatigue   - Nausea or vomiting   - Aches, chills, or other flu-like symptoms   - Redness, tenderness, or swelling of the breast   - A burning feeling in the breast   - A hard feeling or tender lump in the breast   - Pus draining from the nipple   - Swollen lymph glands in the armpit or above the collar bones    Mastitis: Care Instructions  Your Care Instructions  Mastitis is an inflammation of the breast. It occurs most often in women who are breastfeeding, but it can affect any woman. Mastitis can be caused by poor milk flow from the breast. When milk builds up in a breast, it leaks into the nearby breast tissue. Infection can also develop when the nipples become cracked or irritated. The tissue can then become infected with bacteria.  Antibiotics can usually cure mastitis. For women who are nursing, continued breastfeeding (or pumping) can help. If mastitis is not treated, a pocket of pus may form in the breast and need to be drained.  Follow-up care is a key part of your treatment and safety. Be sure to make and go to all appointments, and call your doctor if you are having problems. It's also a good idea to know your test results and keep a list of the medicines you take.  How can you care for yourself at home?  If your doctor prescribed antibiotics, take them as directed. Do not stop taking them just because you feel better. You need to take the full course of antibiotics.  If you are breastfeeding, continue breastfeeding or pumping breast milk. It is important to empty your breasts regularly, every 2 to 3 hours while you are awake. These tips may help:  Before breastfeeding, place a warm, wet washcloth over your breast for about 15 minutes. Try this at least 3 times a day. This increases milk flow in the breast. Massaging the affected breast may also increase milk  flow.  Breastfeed on both sides. Try to start with your healthy breast first. Then, after your milk is flowing, breastfeed from the affected breast until it feels soft. You should empty this breast completely. Then switch back to the healthy breast and breastfeed until your baby has finished.  Pump or hand-express a small amount of breast milk before breastfeeding if your breasts are too full with milk. This will make your breasts less full and may make it easier for your baby to latch on to your breast.  Pump or express milk from the affected breast if it hurts too much to breastfeed.  Take an over-the-counter pain medicine, such as acetaminophen (Tylenol) or ibuprofen (Advil, Motrin) to relieve pain and fever. Read and follow all instructions on the label.  Do not take two or more pain medicines at the same time unless the doctor told you to. Many pain medicines have acetaminophen, which is Tylenol. Too much acetaminophen (Tylenol) can be harmful.  Rest as much as possible.  Drink extra fluids.  If pus is draining from your infected breast, wash the nipple gently and let it air-dry before you put your bra back on. A disposable breast pad placed in the bra cup may absorb the pus.  Sometimes, a blocked nipple pore, called a milk blister (or bleb) happens. This causes milk to back up in the breast. It can cause mastitis, so it's important to treat this if it happens. A milk blister is often a white dot on your nipple that can be painful. If a milk blister is causing you pain, it may help to place a warm, wet washcloth over the blister before breastfeeding or pumping. If this doesn't clear the blockage, your doctor can open the blister using a sterile needle.  When should you call for help?   Call your doctor now or seek immediate medical care if:    You have worse symptoms of a breast infection, such as:  Increased pain, swelling, redness, or warmth around a breast.  Red streaks leading from a breast.  Pus draining  "from a breast.  A fever.   Watch closely for changes in your health, and be sure to contact your doctor if:    You do not get better as expected.   Where can you learn more?  Go to https://www.[employer].net/patiented  Enter Y207 in the search box to learn more about \"Mastitis: Care Instructions.\"  Current as of: August 2, 2022               Content Version: 13.7    3755-4905 [employer].   Care instructions adapted under license by your healthcare professional. If you have questions about a medical condition or this instruction, always ask your healthcare professional. [employer] disclaims any warranty or liability for your use of this information.        "

## 2023-10-21 NOTE — PROGRESS NOTES
[facility]  Gynecology Progress Note    S:  Zara is feeling better this morning after drainage yesterday. Pain has improved and feels like redness has gone down.  Used a dose of oxycodone overnight, which helped. Still pumping and breast feeding. Tolerating PO without nausea/vomiting.  No fevers or chills.     O:  Patient Vitals for the past 24 hrs:   BP Temp Temp src Pulse Resp SpO2   10/21/23 0729 106/78 97.5  F (36.4  C) Oral 57 14 --   10/20/23 2323 117/75 97.7  F (36.5  C) Oral 69 16 --   10/20/23 1821 109/76 -- -- 59 -- 98 %   10/20/23 1616 (!) 143/104 97.9  F (36.6  C) Oral 75 14 --     Gen: Resting comfortably, NAD  CV: Warm, well perfused  Pulm: No increased work of breathing  Breast:  Right breast with erythematous area that is firm to touch, (improved per patient) at 11 o'clock around areola and nipple. Small area that is firm around 4 o'clock. No signs of skin break down or nipple involvement. Nipple normal in appearance without signs of drainage. Erythema receded from previous marked line.       Hemoglobin   Date Value Ref Range Status   10/20/2023 11.1 (L) 11.7 - 15.7 g/dL Final   04/15/2010 12.9 11.7 - 15.7 g/dL Final   ]    A/P:  Zara Kaminski is a 33 year old  PPD#41 s/p PLTCS with h/o mastitis, trauma and stressor related disorder, and ADHD who presents for worsening symptoms of mastitis. Patient has had multiple attempts at aspiration at the breast center without successful drainage. Based on previous breast aspirate, prior trialed oral antibiotics should have appropriately covered organisms based on culture sensitivities, patient was being treated with IV antibiotics and an appropriate clinical response was seen. Patient underwent successful ultrasound-guided aspiration of the right breast with return of 22cc of thick pus and successful abscess decompression with IR.     Mastitis and breast abscess:  - s/p IR drainage (10/20) with 22cc thick pus; sent for  culture  - S/p 2D ceftriaxone and vancomycin. Per pharmacy recs, discontinued vanc and transitioned to Ancef 2g q8h, now D#4 of Ancef. IV antibiotics have been working for 72 hours with noted improvement over the first 48 hours. Will plan to transition to PO abx. Last aspiration culture with Staph susceptible to diclox, however patient failed treatment outpatient with 7 day course. Consider BactrimX 14 days.    - S/p attempted aspiration  x 2 (10/16, 10/11)with minimal output   - CBC shows normal WBC count 9.0>6.9>6.6>6.6, CRP 11>9>9>6; pending this am  - Ibuprofen, Tylenol q6h for discomfort, treating appropriately  - Lactation team consulted, pt comfortable with plan of care  - Continue to pump/feed from R breast as able     Postpartum  - Routine postpartum cares    Dispo: Possible discharge today pending antibiotic plan    Daina Zuniga MD  OB/GYN Resident, PGY-3

## 2023-10-23 LAB
BACTERIA ABSC ANAEROBE+AEROBE CULT: ABNORMAL
GRAM STAIN RESULT: ABNORMAL
GRAM STAIN RESULT: ABNORMAL

## 2023-10-24 ENCOUNTER — VIRTUAL VISIT (OUTPATIENT)
Dept: BEHAVIORAL HEALTH | Facility: CLINIC | Age: 33
End: 2023-10-24
Payer: COMMERCIAL

## 2023-10-24 DIAGNOSIS — F43.21 ADJUSTMENT DISORDER WITH DEPRESSED MOOD: Primary | ICD-10-CM

## 2023-10-24 PROCEDURE — 90834 PSYTX W PT 45 MINUTES: CPT | Mod: 93

## 2023-10-24 NOTE — PROGRESS NOTES
"[facility]  October 24, 2023  Behavioral Health Clinician Progress Note    Patient Name: Zara Kaminski         Service Type:  Individual      Service Location:   Phone call (patient / identified key support person reached)     Session Start Time: 11:00 AM  Session End Time: 11:50 AM      Session Length: 38 - 52      Attendees: Patient     Service Modality:  Phone Visit:      Provider verified identity through the following two step process.  Patient provided:  Patient is known previously to provider    Telephone Visit: The patient's condition can be safely assessed and treated via synchronous audio telemedicine encounter.      Reason for Audio Telemedicine Visit: Patient convenience (e.g. access to timely appointments / distance to available provider)    Originating Site (Patient Location): Patient's home    Distant Site (Provider Location): [facility] [place]    Consent:  The patient/guardian has verbally consented to:     1. The potential risks and benefits of telemedicine (telephone visit) versus in person care;    The patient has been notified of the following:      \"We have found that certain health care needs can be provided without the need for a face to face visit.  This service lets us provide the care you need with a phone conversation.       I will have full access to your [facility] medical record during this entire phone call.   I will be taking notes for your medical record.      Since this is like an office visit, we will bill your insurance company for this service.       There are potential benefits and risks of telephone visits (e.g. limits to patient confidentiality) that differ from in-person visits.?Confidentiality still applies for telephone services, and nobody will record the visit.  It is important to be in a quiet, private space that is free of distractions (including cell phone or other devices) during the visit.??      If during the course of " "the call I believe a telephone visit is not appropriate, you will not be charged for this service\"     Consent has been obtained for this service by care team member: Yes     Visit Activities (Refresh list every visit): [facility] Only and Phone Encounter    Diagnostic Assessment Date: 8/15/23  Treatment Plan Review Date: Reviewed 10/24, review due 24  See Flowsheets for today's PHQ-9 and MAHAD-7 results  Previous PHQ-9:       2023    12:47 PM 2023    11:40 AM 2023    11:23 AM   PHQ-9 SCORE   PHQ-9 Total Score MyChart 8 (Mild depression)     PHQ-9 Total Score 8 3 5     Previous MAHAD-7:       2023    12:47 PM 2023    11:23 AM   MAHAD-7 SCORE   Total Score 6 (mild anxiety)    Total Score 6 2       JACK LEVEL:       No data to display                DATA  Extended Session (60+ minutes): No  Interactive Complexity: No  Crisis: No  [facility] Patient: No    Treatment Objective(s) Addressed in This Session:  Target Behavior(s): adaptive postpartum adjustment    Current Stressors / Issues:  Patient shared that her postpartum experience so far has emotionally been \"kind of rough\", with low mood exacerbated by mastitis, poor sleep, and task completion being impeded by ADHD symptoms. She noted distress associated with physical recovery and the overall changes in how she feels in her body. Patient described efforts to keep a positive mindset. [facility] validated patient's experience, explored how patient can promote distress tolerance as she navigates these challenges. [facility] also provided information about seeking a  psychiatry referral if she has interest in restarting medication.     Progress on Treatment Objective(s) / Homework:  No improvement - ACTION (Actively working towards change); Intervened by reinforcing change plan / affirming steps taken    Assessments completed prior to visit:  PHQ9:       2023    12:47 PM 2023    11:40 AM 2023    11:23 AM   PHQ-9 SCORE   PHQ-9 Total Score MyChart 8 " (Mild depression)     PHQ-9 Total Score 8 3 5     GAD7:       7/11/2023    12:47 PM 9/26/2023    11:23 AM   MAHAD-7 SCORE   Total Score 6 (mild anxiety)    Total Score 6 2     PROMIS 10-Global Health (all questions and answers displayed): NEEDS UPDATE      7/11/2023    12:58 PM   PROMIS 10   In general, would you say your health is: Good   In general, would you say your quality of life is: Very good   In general, how would you rate your physical health? Good   In general, how would you rate your mental health, including your mood and your ability to think? Fair   In general, how would you rate your satisfaction with your social activities and relationships? Fair   In general, please rate how well you carry out your usual social activities and roles Fair   To what extent are you able to carry out your everyday physical activities such as walking, climbing stairs, carrying groceries, or moving a chair? Mostly   In the past 7 days, how often have you been bothered by emotional problems such as feeling anxious, depressed, or irritable? Often   In the past 7 days, how would you rate your fatigue on average? Moderate   In the past 7 days, how would you rate your pain on average, where 0 means no pain, and 10 means worst imaginable pain? 3   In general, would you say your health is: 3   In general, would you say your quality of life is: 4   In general, how would you rate your physical health? 3   In general, how would you rate your mental health, including your mood and your ability to think? 2   In general, how would you rate your satisfaction with your social activities and relationships? 2   In general, please rate how well you carry out your usual social activities and roles. (This includes activities at home, at work and in your community, and responsibilities as a parent, child, spouse, employee, friend, etc.) 2   To what extent are you able to carry out your everyday physical activities such as walking, climbing  stairs, carrying groceries, or moving a chair? 4   In the past 7 days, how often have you been bothered by emotional problems such as feeling anxious, depressed, or irritable? 4   In the past 7 days, how would you rate your fatigue on average? 3   In the past 7 days, how would you rate your pain on average, where 0 means no pain, and 10 means worst imaginable pain? 3   Global Mental Health Score 10   Global Physical Health Score 14   PROMIS TOTAL - SUBSCORES 24     Care Plan review completed: No    Medication Review:  No changes to current psychiatric medication(s)    Medication Compliance:  Yes    Changes in Health Issues:  6 weeks postpartum    Chemical Use Review:   Substance Use: Chemical use reviewed, no active concerns identified      Tobacco Use: No current tobacco use.      Assessment: Current Emotional / Mental Status (status of significant symptoms):  Risk status (Self / Other harm or suicidal ideation)  Patient denies a history of suicidal ideation, suicide attempts, self-injurious behavior, homicidal ideation, homicidal behavior, and and other safety concerns  Patient denies current fears or concerns for personal safety.  Patient denies current or recent suicidal ideation or behaviors.  Patient denies current or recent homicidal ideation or behaviors.  Patient denies current or recent self injurious behavior or ideation.  Patient denies other safety concerns.  A safety and risk management plan has not been developed at this time, however patient was encouraged to call [place] [phone] should there be a change in any of these risk factors.    Appearance:   Unable to assess   Eye Contact:   Unable to assess   Psychomotor Behavior: Unable to assess   Attitude:   Cooperative  Interested Pleasant  Orientation:   All  Speech   Rate / Production: Normal/ Responsive   Volume:  Normal   Mood:    Dysphoric   Affect:    Appropriate   Thought Content:  Clear   Thought Form:  Coherent  Goal Directed  Logical    Insight:    Good     Diagnoses:  1. Adjustment disorder with depressed mood        Collateral Reports Completed:  Not Applicable    Plan: (Homework, other):  Follow-up scheduled. CD Recommendations: No indications of CD issues.     WESLEY Burton    ______________________________________________________________________    Integrated Primary Care Behavioral Health Treatment Plan    Patient's Name: Zara Kamisnki  YOB: 1990    Date of Creation: 8/28/23  Date Treatment Plan Last Reviewed/Revised: 10/24/23    DSM5 Diagnoses: Adjustment Disorders  309.28 (F43.23) With mixed anxiety and depressed mood  Psychosocial / Contextual Factors: patient had her first baby in September 2023; death of patient's cousin in May 2023  PROMIS (reviewed every 90 days): 24 - needs update    Referral / Collaboration:  Referral for couples therapy in place    Anticipated number of session for this episode of care: 6  Anticipation frequency of session:  Every 2-4 weeks  Anticipated Duration of each session: 16-37 minutes  Treatment plan will be reviewed in 90 days or when goals have been changed.     MeasurableTreatment Goal(s) related to diagnosis / functional impairment(s)    Goal 1: Patient will reduce distress associated with postpartum adjustment.    Objective #A (Patient Action)    Patient will engage in adaptive cognitive and behavioral coping strategies to reduce symptoms of anxiety and depression.  Status: New - 10/24/23    Intervention(s)  Therapist will assist patient in identifying and challenging unhelpful cognitions, teach grounding and relaxation strategies.     Goal 2: Patient will reduce distress regarding plans for delivery, evidenced by patient report.     Objective #A (Patient Action)    Patient will identify factors contributing to feelings of depression and anxiety.  Status: Completed (no longer relevant)    Intervention(s)  Therapist will guide reflection on external events and cognitions related  to stressors.    Objective #B  Patient will engage in adaptive coping strategies.   Status: Completed (no longer relevant)    Intervention(s)  Therapist will teach cognitive and behavioral skills to manage mental health symptoms.     Goal 3: Patient will process loss in a manner that promotes normative grief reaction.    Objective #A (Patient Action)    Patient will discuss evolution of grief reaction in session, at a pace that feels comfortable for her.   Status: Continued - Date: 10/24/23    Intervention(s)  Therapist will support patient in reflecting on emotions within grief experience in session.     Patient has reviewed and agreed to the above plan.      Montserrat Parrish, WESLEY  August 28, 2023

## 2023-10-27 LAB — BACTERIA ABSC ANAEROBE+AEROBE CULT: NORMAL

## 2023-11-03 ENCOUNTER — OFFICE VISIT (OUTPATIENT)
Dept: OBGYN | Facility: CLINIC | Age: 33
End: 2023-11-03
Payer: COMMERCIAL

## 2023-11-03 VITALS
HEART RATE: 72 BPM | WEIGHT: 192.6 LBS | BODY MASS INDEX: 30.17 KG/M2 | OXYGEN SATURATION: 98 % | SYSTOLIC BLOOD PRESSURE: 126 MMHG | DIASTOLIC BLOOD PRESSURE: 75 MMHG

## 2023-11-03 DIAGNOSIS — N61.1 BREAST ABSCESS: ICD-10-CM

## 2023-11-03 PROCEDURE — 99213 OFFICE O/P EST LOW 20 MIN: CPT | Performed by: OBSTETRICS & GYNECOLOGY

## 2023-11-03 RX ORDER — SULFAMETHOXAZOLE/TRIMETHOPRIM 800-160 MG
1 TABLET ORAL 2 TIMES DAILY
Qty: 28 TABLET | Refills: 0 | Status: SHIPPED | OUTPATIENT
Start: 2023-11-03

## 2023-11-03 NOTE — Clinical Note
Just an FYI:  You may remember Zara.  She had mastitis, then breat abscess, and was admitted to hospital for this after failed outpatient management, failed drainage in breast clinic.  I saw her today, things improved, but not resolved.  Recommended another US before stopping Antibiotics, so this was ordered.  She'll call to schedule, but IR doc who ultimately drained abscess recommended against another aspiration attempt as this failed before, presumably because fluid was so thick.  Asked that breast clinic call your line, number provided) if abscess remains so you can consult working MDs to discuss next steps.  Otherwise, I'll review results in my inbox when they're available.  Thanks, David

## 2023-11-03 NOTE — PROGRESS NOTES
GYN Problem Visit                                                 CC:  breast abscess    HPI:  Zara Kaminski is a 33 year old female who presents to clinic today as follow-up of mastitis, breast abscess.    Was admitted to the hospital from 10/16/2023 to 10/21/2020.  Due to breast mastitis and abscess failing outpatient management, including multiple failed breast aspirations in the breast clinic.  She ultimately had IR assisted drainage which was successful and drained just over 20 cc of thick pus.  She was discharged with a prescription for Bactrim twice daily, which she has tolerated well.    Reports feeling like that things continue to improve, but are not resolved.  No fevers or chills.  Skin changes have much improved.  Not as red or tender anymore.  However, she does still palpate a mass in that area.      EXAM:  Blood pressure 126/75, pulse 72, weight 87.4 kg (192 lb 9.6 oz), last menstrual period 2022, SpO2 98%, currently breastfeeding.   BMI= Body mass index is 30.17 kg/m .  General - pleasant female in no acute distress.  Breast -right breast with approximately 3.5 x 2 cm firm lesion just superior to areola.  Skin very slightly red over this area, but no induration or warmth.  Minimal tenderness to palpation.  Musculoskeletal - no gross deformities.  Neurological - normal strength, sensation, and mental status.      Assessment:    Zara Kaminski is a 33 year old  who presents today to discuss breast abscess.    Plan:  (N61.1) Breast abscess  Comment: Given complicated and prolonged treatment course, recommend continuing antibiotics until we can further evaluate this lesion.  Inflammation and/or scar tissue from abscess are certainly on the differential, but want to evaluate for continued abscess.  Given difficulty in draining in the past, Dr. Sherman (IR MD who was able to drain it) recommended patient her directly if it needs to be drained again.  Placed order for her breast ultrasound in  the breast clinic.  Patient comfortable and calling to schedule herself.  I did put a note in order to please call our nurse line, number provided, if abscess remains.  They can consult with the doctors working that day to discuss best course of treatment and consider involving IR again, if deemed appropriate.  Refill of Bactrim provided  Plan: US Breast Right Complete 4 Quadrants,         sulfamethoxazole-trimethoprim (BACTRIM DS)         800-160 MG tablet            Will follow-up on breast US results..  Not scheduled for in person follow-up at this time, pending US results.      Gretchen Hernandez MD

## 2023-11-08 ENCOUNTER — ANCILLARY PROCEDURE (OUTPATIENT)
Dept: MAMMOGRAPHY | Facility: CLINIC | Age: 33
End: 2023-11-08
Attending: OBSTETRICS & GYNECOLOGY
Payer: COMMERCIAL

## 2023-11-08 DIAGNOSIS — N61.1 BREAST ABSCESS: ICD-10-CM

## 2023-11-08 PROCEDURE — 76642 ULTRASOUND BREAST LIMITED: CPT | Mod: RT | Performed by: RADIOLOGY

## 2023-11-10 ENCOUNTER — TELEPHONE (OUTPATIENT)
Dept: OBGYN | Facility: CLINIC | Age: 33
End: 2023-11-10
Payer: COMMERCIAL

## 2023-11-10 ENCOUNTER — PATIENT OUTREACH (OUTPATIENT)
Dept: ONCOLOGY | Facility: CLINIC | Age: 33
End: 2023-11-10
Payer: COMMERCIAL

## 2023-11-10 NOTE — TELEPHONE ENCOUNTER
Attempted to call patient to discuss scheduling with [facility].  Scheduling line - [phone]  Paper prescription faxed as well with emphasis on priority status of referral.  3dplusme message sent as well.  Attempted to call [facility] - was on hold will attempt at another time.    Precious Negro RN        This patient has had a prolonged course with mastitis and a breast abscess.  She recently had the attached US, showing significant decrease in size, but not resolution of the abscess.  I'd like her to see a breast surgeon for a consult to ensure they don't recommend surgical management at this point.  She has done prolonged courses of antibiotics, including an admission for IV antibiotics, so I think reasonable to get the opinion of a breast surgeon at this point.  Can you call the breast clinic to help facilitate this, as I'd like her to see them sooner rather than later.     Thanks,  Gretchen Hernandez MD

## 2023-11-10 NOTE — PROGRESS NOTES
New Patient Oncology Nurse Navigator Note     Referring provider: Gretchen Hernandez MD      Referring Clinic/Organization: [facility] - OB/GYN      Referred to (specialty:) Breast Provider Referral     Requested provider (if applicable): NA     Date Referral Received: November 10, 2023     Evaluation for:  Benign breast condition    Unresolved breast abscess - discuss with surgeon to decide if surgical management is needed.      Clinical History (per Nurse review of records provided):      Patient seen by Dr. Hernandez on 11/3/23 for follow up of mastitis, breast abscess. She was admitted to the hospital from 10/16/2023 to 10/21/2020.  Due to breast mastitis and abscess failing outpatient management, including multiple failed breast aspirations in the breast clinic.  She ultimately had IR assisted drainage which was successful and drained just over 20 cc of thick pus.  She was discharged with a prescription for Bactrim twice daily, which she has tolerated well.     Ultrasound of breast done on 11/08/23 showed:  Targeted right breast ultrasound evaluation was performed by the  technologist and radiologist. At the 12:00 position, 1 cm from the  nipple, there is a small fluid collection measuring 1.5 x 1.4 x 1.6  cm, smaller in size. Clinical Follow-up. No drainage of fluid collection planned for today. Patient advised to return if palpable lump increases in size or clinical symptoms worsen.Also discussed with patient possibility of surgical consultation if clinical symptoms worsen.     Records Location: See Bookmarked material     Records Needed: NA     Additional testing needed prior to consult: NA    Payor: BCBS / Plan: BCBS OUT OF STATE / Product Type: Indemnity /     November 10, 2023      Called patient to introduced myself and role as nurse navigator with [facility] Hematology/Oncology department and to inform them that we have received the referral for a diagnosis of chronic breast  jai from Gretchen Hernandez MD. Patient confirms they are aware of the referral and ready to schedule.  Provided them with contact information for NPS and encourage them to call with any questions. Patient verbalized understanding of plan. Transferred to NPS to schedule.     Verónica Potts, RN, BSN  [facility] Hematology/Oncology Nurse Navigator  [phone]

## 2023-11-14 NOTE — TELEPHONE ENCOUNTER
RECORDS STATUS - BREAST    RECORDS REQUESTED FROM: Epic   DATE REQUESTED:    NOTES DETAILS STATUS   OFFICE NOTE from referring provider Epic 1/3/23: Dr. Gretchen Hernandez   DISCHARGE SUMMARY from hospital [facility] 10/16/23: [facility]   MEDICATION LIST Epic    IMAGING (NEED IMAGES & REPORT)     MAMMO PACS 10/11/23   ULTRASOUND PACS 11/8/23-10/11/23

## 2023-11-14 NOTE — PROGRESS NOTES
NEW CONSULTATION   Nov 16, 2023     Zara Kaminski is a 33 year old woman who presents with a history of right breast mastitis. She was referred by Dr. Hernandez.    HPI:    Family history of maternal aunt with breast cancer.     She is 2 months post partum and breast feeding. When she was 2 weeks post partum she developed right breast swelling and erythema in the superior portion of the breast. She then developed fever and was started on dicloxacillin. Her systemic symptoms and fever resolved. The erythema and swelling persistent. She underwent a mammogram and right breast ultrasound 10/11/23 that demonstrated a fluid collection. The fluid collection was aspirated and grew staph aureus resistant to erythromycin and clindamycin. The right breast mass and erythema persistent so she was admitted to the hospital 10/16/23-10/21/20 for IV antibiotics. She continued to be afebrile. During that hospitalization she underwent IR guided aspiration 10/20, cultures grew staph aureus resistant to clindamycin. She was discharged on Bactrim, she will completes a 4 week course in 2 days.  She had a right breast ultrasound last week that appears improved.     Today she reports doing overall well and improving. She denies fever. She occasionally experiences a random right breast shooting pain. She has stable swelling in the right superior breast. The erythema is improved.     BREAST-SPECIFIC HISTORY:    Previous breast imaging: Yes  - 10/11/23 b/l Dmammo and right breast ultrasound for pain and redness: 12:00 1 cm FN complex fluid collection measuring 5 cm, 5 ml of cloudy white fluid was aspirated BI-RADS 2: staph aureus resistant to erythromycin and clindamycin  - 10/16/23 right breast ultrasound: 12:00 2 cm FN complex fluid collection 5 ml of tan milky fluid aspirated  - 10/20/23 right breast ultrasound: 12:00 1cm FN complicated fluid collection 4.6 cm BI-RADS 2 aspirated staph aureus resistant to clindamycin  - 11/8/23 right breast  ultrasound: 12:00 1 cm FN 1.5 cm fluid collection BI-RADS 2    Prior breast biopsies/surgeries: No  - 10/11/23 b/l Dmammo and right breast ultrasound for pain and redness: 12:00 1 cm FN complex fluid collection measuring 5 cm, 5 ml of cloudy white fluid was aspirated BI-RADS 2 staph aureus resistant to erythromycin and clindamycin  - 10/16/23 right breast ultrasound: 12:00 2 cm FN complex fluid collection 5 ml of tan milky fluid aspirated  - 10/20/23 right breast ultrasound: 12:00 1cm FN complicated fluid collection 4.6 cm BI-RADS 2 aspirated staph aureus resistant to clindamycin    Prior history of breast cancer or DCIS: No  Prior radiation history: No (years treated)  Self breast exams: Yes  Breast density: heterogeneously dense    GYN HISTORY:  . Age at 1st pregnancy: 33. Breastfeeding history: Yes.   Age at menarche: 10  Menopausal: premenopausal.   Menopausal hormone replacement therapy: No     RISK ASSESSMENT: < 3% 5 year risk by Meka, < 5% 10 year risk by SHERLEY, and > 20% lifetime risk by SHERLEY  Meka: 0.4% 5 year risk   SHERLEY/Byron: 26.6% lifetime risk, 2.1% 10 year risk     FAMILY HISTORY:  Breast ca: Yes  - maternal aunt, 30 (aunts 2)  Ovarian ca: No  Pancreatic ca: No  Prostate: No  Gastric ca: No  Melanoma: No  Colon ca: No  Other cancer: No  Other genetic, testing, syndromes, or clotting disorders: no  - mother with negative genetic testing      PAST MEDICAL HISTORY  Past Medical History:   Diagnosis Date    ADHD (attention deficit hyperactivity disorder)     Mastitis 10/16/2023    Trauma and stressor-related disorder 2016     PAST SURGICAL HISTORY   Past Surgical History:   Procedure Laterality Date     SECTION N/A 9/10/2023    Procedure:  section;  Surgeon: Gretchen Hernandez MD;  Location: [facility]    IR FINE NEEDLE ASPIRATION W ULTRASOUND  10/20/2023    WISDOM TOOTH EXTRACTION       MEDICATIONS  Current Outpatient Medications   Medication Sig Dispense Refill     acetaminophen (TYLENOL) 325 MG tablet Take 2 tablets (650 mg) by mouth every 6 hours as needed for mild pain 100 tablet 0    cyclobenzaprine (FLEXERIL) 10 MG tablet Take 1 tablet (10 mg) by mouth every 8 hours as needed for muscle spasms (Patient not taking: Reported on 9/26/2023) 12 tablet 0    escitalopram (LEXAPRO) 5 MG tablet Take 1 tablet (5 mg) by mouth daily for 90 days 90 tablet 3    ferrous sulfate (FEROSUL) 325 (65 Fe) MG tablet Take 1 tablet (325 mg) by mouth every other day 30 tablet 1    folic acid-vit B6-vit B12 (FOLGARD) 0.8-10-0.115 MG TABS per tablet Take by mouth daily (Patient not taking: Reported on 9/26/2023)      ibuprofen (ADVIL/MOTRIN) 600 MG tablet Take 1 tablet (600 mg) by mouth every 6 hours as needed for inflammatory pain, mild pain or fever 30 tablet 0    Misc. Devices (BREAST PUMP) MISC 1 each continuous prn (breast feeding) 1 each 0    mupirocin (BACTROBAN) 2 % external ointment Apply to nipples after feedings with plastic wrap.  No need to wash off. (Patient not taking: Reported on 11/3/2023) 30 g 1    norethindrone (MICRONOR) 0.35 MG tablet Take 1 tablet (0.35 mg) by mouth daily (Patient not taking: Reported on 9/26/2023) 84 tablet 3    Prenatal Vit-Fe Fumarate-FA (PRENATAL MULTIVITAMIN W/IRON) 27-0.8 MG tablet Take 1 tablet by mouth daily      senna-docusate (SENOKOT-S/PERICOLACE) 8.6-50 MG tablet Take 1 tablet by mouth daily Start after delivery. 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     ROS:   Change in vision No  Headaches: no  Respiratory: No shortness of breath, dyspnea on exertion, cough, or hemoptysis   Cardiovascular: negative   Gastrointestinal: negative Abdominal pain: no  Breast:  "negative  Musculoskeletal: negative Joint pain No Back pain: no  Psychiatric: negative  Hematologic/Lymphatic/Immunologic: negative  Endocrine: negative    EXAM  /79 (BP Location: Right arm, Patient Position: Sitting, Cuff Size: Adult Regular)   Pulse 64   Temp 97.9  F (36.6  C) (Oral)   Resp 16   Ht 1.725 m (5' 7.91\")   Wt 86.1 kg (189 lb 12.8 oz)   LMP 12/01/2022   SpO2 99%   BMI 28.93 kg/m     PHYSICAL EXAM  Respiratory: breathing non labored.   Breasts: Examination was done in both the upright and supine positions.  Breasts are symmetrical. No nipple inversion or change. Bilateral milky nipple discharge. No left breast mass or skin change. Right breast with subtle mild erythema 12:00, round 3 cm area. Deep to this area is a 3x2 cm firm mass.   No clavicular, cervical, or axillary lymphadenopathy.     ASSESSMENT/PLAN:    Zara Kaminski is a 33 year old woman history of right breast mastitis, resolving. She has a family history of breast cancer and does meet NCCN guidelines for high risk screening.     1) Right breast mastitis, resolving  - complete current course of antibiotics  - Follow up in 1-2 weeks.     2) Family history of breast cancer  Family history of breast cancer  She meets NCCN guidelines for high risk screening based on family history with lifetime risk for breast cancer of >20%. Screening recommendations based on NCCN guidelines. Be familiar with your breast and promptly report any changes to your health care provider. Clinical encounter every 6-12 months starting now (but not prior to age 21). Annual mammogram with radha starting 10 years prior to the youngest family member but not less than age 30 or age 40 ( whichever comes first). Annual breast MRI to begin 10 years prior to the youngest family member diagnosed but not less than 25 years old or age 40 ( whichever comes first).  Counseling was provided with available strategies including lifestyle modifications and risk reducing " intervention.   - Breast MRI with clinic visit due 4/2024  - Screening mammogram with clinic visit due 10/12/24    3) Lifestyle Modifications were provided.   - Maintain your best healthy weight. Higher body fat and adult weight gain is associated with increased risk for breast cancer. This increase in risk has been attributed to increase in circulating endogenous estrogen levels from fat tissue.   - Any alcohol intake increases the risk for breast cancer. If you choose to drink alcohol limit alcohol consumption to less than 1 drink (1 ounce of liquor, 6 ounces of wine, or 8 ounces of beer) per day or less than 3 drinks per week.  - Be active daily and void being sedentary.   - Vitamin D may decrease the risk of developing breast cancer.     Nicole Goss PA-C    30 minutes spent on the date of the encounter doing chart review, review of test results, interpretation of tests, patient visit and documentation.

## 2023-11-16 ENCOUNTER — PRE VISIT (OUTPATIENT)
Dept: ONCOLOGY | Facility: CLINIC | Age: 33
End: 2023-11-16
Payer: COMMERCIAL

## 2023-11-16 ENCOUNTER — ONCOLOGY VISIT (OUTPATIENT)
Dept: SURGERY | Facility: CLINIC | Age: 33
End: 2023-11-16
Attending: OBSTETRICS & GYNECOLOGY
Payer: COMMERCIAL

## 2023-11-16 ENCOUNTER — MEDICAL CORRESPONDENCE (OUTPATIENT)
Dept: HEALTH INFORMATION MANAGEMENT | Facility: CLINIC | Age: 33
End: 2023-11-16

## 2023-11-16 VITALS
SYSTOLIC BLOOD PRESSURE: 119 MMHG | BODY MASS INDEX: 28.76 KG/M2 | OXYGEN SATURATION: 99 % | DIASTOLIC BLOOD PRESSURE: 79 MMHG | HEART RATE: 64 BPM | WEIGHT: 189.8 LBS | HEIGHT: 68 IN | TEMPERATURE: 97.9 F | RESPIRATION RATE: 16 BRPM

## 2023-11-16 DIAGNOSIS — Z91.89 AT HIGH RISK FOR BREAST CANCER: Primary | ICD-10-CM

## 2023-11-16 DIAGNOSIS — Z80.3 FAMILY HISTORY OF BREAST CANCER: ICD-10-CM

## 2023-11-16 DIAGNOSIS — N61.1 BREAST ABSCESS: ICD-10-CM

## 2023-11-16 DIAGNOSIS — N61.0 MASTITIS: ICD-10-CM

## 2023-11-16 PROCEDURE — 99213 OFFICE O/P EST LOW 20 MIN: CPT | Performed by: PHYSICIAN ASSISTANT

## 2023-11-16 PROCEDURE — 99214 OFFICE O/P EST MOD 30 MIN: CPT | Performed by: PHYSICIAN ASSISTANT

## 2023-11-16 ASSESSMENT — PAIN SCALES - GENERAL: PAINLEVEL: NO PAIN (0)

## 2023-11-16 NOTE — PATIENT INSTRUCTIONS
Zara Kaminski is a 33 year old woman history of right breast mastitis, resolving. She has a family history of breast cancer and does meet NCCN guidelines for high risk screening.     1) Right breast mastitis, resolving  - complete current course of antibiotics  - Follow up in 1-2 weeks.     2) Family history of breast cancer  Family history of breast cancer  She meets NCCN guidelines for high risk screening based on family history with lifetime risk for breast cancer of >20%. Screening recommendations based on NCCN guidelines. Be familiar with your breast and promptly report any changes to your health care provider. Clinical encounter every 6-12 months starting now (but not prior to age 21). Annual mammogram with radha starting 10 years prior to the youngest family member but not less than age 30 or age 40 ( whichever comes first). Annual breast MRI to begin 10 years prior to the youngest family member diagnosed but not less than 25 years old or age 40 ( whichever comes first).  Counseling was provided with available strategies including lifestyle modifications and risk reducing intervention.   - Breast MRI with clinic visit due 4/2024  - Screening mammogram with clinic visit due 10/12/24    3) Lifestyle Modifications were provided.   - Maintain your best healthy weight. Higher body fat and adult weight gain is associated with increased risk for breast cancer. This increase in risk has been attributed to increase in circulating endogenous estrogen levels from fat tissue.   - Any alcohol intake increases the risk for breast cancer. If you choose to drink alcohol limit alcohol consumption to less than 1 drink (1 ounce of liquor, 6 ounces of wine, or 8 ounces of beer) per day or less than 3 drinks per week.  - Be active daily and void being sedentary.   - Vitamin D may decrease the risk of developing breast cancer.

## 2023-11-16 NOTE — NURSING NOTE
"Oncology Rooming Note    November 16, 2023 9:58 AM   Zara Kaminski is a 33 year old female who presents for:    Chief Complaint   Patient presents with    Oncology Clinic Visit     Breast abscess     Initial Vitals: /79 (BP Location: Right arm, Patient Position: Sitting, Cuff Size: Adult Regular)   Pulse 64   Temp 97.9  F (36.6  C) (Oral)   Resp 16   Ht 1.725 m (5' 7.91\")   Wt 86.1 kg (189 lb 12.8 oz)   LMP 12/01/2022   SpO2 99%   BMI 28.93 kg/m   Estimated body mass index is 28.93 kg/m  as calculated from the following:    Height as of this encounter: 1.725 m (5' 7.91\").    Weight as of this encounter: 86.1 kg (189 lb 12.8 oz). Body surface area is 2.03 meters squared.  No Pain (0) Comment: Data Unavailable   Patient's last menstrual period was 12/01/2022.  Allergies reviewed: Yes  Medications reviewed: Yes    Medications: Medication refills not needed today.  Pharmacy name entered into [facility]:    [facility] - [place], [place] - [address]  [facility] - [place], [place] - [address] [id number] - [place], [place] - [address]    Clinical concerns: none       Kenyetta Umana              "

## 2023-11-16 NOTE — LETTER
11/16/2023         RE: Zara Kaminski  [address]  [facility] [address]        Dear Colleague,    Thank you for referring your patient, Zara Kaminski, to the [facility]. Please see a copy of my visit note below.    NEW CONSULTATION   Nov 16, 2023     Zara Kaminski is a 33 year old woman who presents with a history of right breast mastitis. She was referred by Dr. Hernandez.    HPI:    Family history of maternal aunt with breast cancer.     She is 2 months post partum and breast feeding. When she was 2 weeks post partum she developed right breast swelling and erythema in the superior portion of the breast. She then developed fever and was started on dicloxacillin. Her systemic symptoms and fever resolved. The erythema and swelling persistent. She underwent a mammogram and right breast ultrasound 10/11/23 that demonstrated a fluid collection. The fluid collection was aspirated and grew staph aureus resistant to erythromycin and clindamycin. The right breast mass and erythema persistent so she was admitted to the hospital 10/16/23-10/21/20 for IV antibiotics. She continued to be afebrile. During that hospitalization she underwent IR guided aspiration 10/20, cultures grew staph aureus resistant to clindamycin. She was discharged on Bactrim, she will completes a 4 week course in 2 days.  She had a right breast ultrasound last week that appears improved.     Today she reports doing overall well and improving. She denies fever. She occasionally experiences a random right breast shooting pain. She has stable swelling in the right superior breast. The erythema is improved.     BREAST-SPECIFIC HISTORY:    Previous breast imaging: Yes  - 10/11/23 b/l Dmammo and right breast ultrasound for pain and redness: 12:00 1 cm FN complex fluid collection measuring 5 cm, 5 ml of cloudy white fluid was aspirated BI-RADS 2: staph aureus resistant to erythromycin and clindamycin  - 10/16/23 right breast  ultrasound: 12:00 2 cm FN complex fluid collection 5 ml of tan milky fluid aspirated  - 10/20/23 right breast ultrasound: 12:00 1cm FN complicated fluid collection 4.6 cm BI-RADS 2 aspirated staph aureus resistant to clindamycin  - 23 right breast ultrasound: 12:00 1 cm FN 1.5 cm fluid collection BI-RADS 2    Prior breast biopsies/surgeries: No  - 10/11/23 b/l Dmammo and right breast ultrasound for pain and redness: 12:00 1 cm FN complex fluid collection measuring 5 cm, 5 ml of cloudy white fluid was aspirated BI-RADS 2 staph aureus resistant to erythromycin and clindamycin  - 10/16/23 right breast ultrasound: 12:00 2 cm FN complex fluid collection 5 ml of tan milky fluid aspirated  - 10/20/23 right breast ultrasound: 12:00 1cm FN complicated fluid collection 4.6 cm BI-RADS 2 aspirated staph aureus resistant to clindamycin    Prior history of breast cancer or DCIS: No  Prior radiation history: No (years treated)  Self breast exams: Yes  Breast density: heterogeneously dense    GYN HISTORY:  . Age at 1st pregnancy: 33. Breastfeeding history: Yes.   Age at menarche: 10  Menopausal: premenopausal.   Menopausal hormone replacement therapy: No     RISK ASSESSMENT: < 3% 5 year risk by Meka, < 5% 10 year risk by SHERLEY, and > 20% lifetime risk by SHERLEY  Meka: 0.4% 5 year risk   SHERLEY/Byron: 26.6% lifetime risk, 2.1% 10 year risk     FAMILY HISTORY:  Breast ca: Yes  - maternal aunt, 30 (aunts 2)  Ovarian ca: No  Pancreatic ca: No  Prostate: No  Gastric ca: No  Melanoma: No  Colon ca: No  Other cancer: No  Other genetic, testing, syndromes, or clotting disorders: no  - mother with negative genetic testing      PAST MEDICAL HISTORY  Past Medical History:   Diagnosis Date    ADHD (attention deficit hyperactivity disorder)     Mastitis 10/16/2023    Trauma and stressor-related disorder 2016     PAST SURGICAL HISTORY   Past Surgical History:   Procedure Laterality Date     SECTION N/A 9/10/2023     Procedure:  section;  Surgeon: Gretchen Hernandez MD;  [facility]: [facility]    IR FINE NEEDLE ASPIRATION W ULTRASOUND  10/20/2023    WISDOM TOOTH EXTRACTION       MEDICATIONS  Current Outpatient Medications   Medication Sig Dispense Refill    acetaminophen (TYLENOL) 325 MG tablet Take 2 tablets (650 mg) by mouth every 6 hours as needed for mild pain 100 tablet 0    cyclobenzaprine (FLEXERIL) 10 MG tablet Take 1 tablet (10 mg) by mouth every 8 hours as needed for muscle spasms (Patient not taking: Reported on 2023) 12 tablet 0    escitalopram (LEXAPRO) 5 MG tablet Take 1 tablet (5 mg) by mouth daily for 90 days 90 tablet 3    ferrous sulfate (FEROSUL) 325 (65 Fe) MG tablet Take 1 tablet (325 mg) by mouth every other day 30 tablet 1    folic acid-vit B6-vit B12 (FOLGARD) 0.8-10-0.115 MG TABS per tablet Take by mouth daily (Patient not taking: Reported on 2023)      ibuprofen (ADVIL/MOTRIN) 600 MG tablet Take 1 tablet (600 mg) by mouth every 6 hours as needed for inflammatory pain, mild pain or fever 30 tablet 0    Misc. Devices (BREAST PUMP) MISC 1 each continuous prn (breast feeding) 1 each 0    mupirocin (BACTROBAN) 2 % external ointment Apply to nipples after feedings with plastic wrap.  No need to wash off. (Patient not taking: Reported on 11/3/2023) 30 g 1    norethindrone (MICRONOR) 0.35 MG tablet Take 1 tablet (0.35 mg) by mouth daily (Patient not taking: Reported on 2023) 84 tablet 3    Prenatal Vit-Fe Fumarate-FA (PRENATAL MULTIVITAMIN W/IRON) 27-0.8 MG tablet Take 1 tablet by mouth daily      senna-docusate (SENOKOT-S/PERICOLACE) 8.6-50 MG tablet Take 1 tablet by mouth daily Start after delivery. 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     ROS:   Change in vision No  Headaches: no  Respiratory: No shortness of breath, dyspnea on exertion, cough, or hemoptysis   Cardiovascular: negative   Gastrointestinal: negative Abdominal pain: no  Breast: negative  Musculoskeletal: negative Joint pain No Back pain: no  Psychiatric: negative  Hematologic/Lymphatic/Immunologic: negative  Endocrine: negative    EXAM  /79 (BP Location: Right arm, Patient Position: Sitting, Cuff Size: Adult Regular)   Pulse 64   Temp 97.9  F (36.6  C) (Oral)   Resp 16   Ht 1.725 m (5' 7.91\")   Wt 86.1 kg (189 lb 12.8 oz)   LMP 12/01/2022   SpO2 99%   BMI 28.93 kg/m     PHYSICAL EXAM  Respiratory: breathing non labored.   Breasts: Examination was done in both the upright and supine positions.  Breasts are symmetrical. No nipple inversion or change. Bilateral milky nipple discharge. No left breast mass or skin change. Right breast with subtle mild erythema 12:00, round 3 cm area. Deep to this area is a 3x2 cm firm mass.   No clavicular, cervical, or axillary lymphadenopathy.     ASSESSMENT/PLAN:    Zara Kaminski is a 33 year old woman history of right breast mastitis, resolving. She has a family history of breast cancer and does meet NCCN guidelines for high risk screening.     1) Right breast mastitis, resolving  - complete current course of antibiotics  - Follow up in 1-2 weeks.     2) Family history of breast cancer  Family history of breast cancer  She meets NCCN guidelines for high risk screening based on family history with lifetime risk for breast cancer of >20%. Screening recommendations based on NCCN guidelines. Be familiar with your breast and promptly report any changes to your health care provider. Clinical encounter every 6-12 months starting now (but not prior to age 21). Annual mammogram with radha starting 10 years prior to the youngest family member but not less than age 30 or age 40 ( whichever comes first). Annual breast MRI to " begin 10 years prior to the youngest family member diagnosed but not less than 25 years old or age 40 ( whichever comes first).  Counseling was provided with available strategies including lifestyle modifications and risk reducing intervention.   - Breast MRI with clinic visit due 4/2024  - Screening mammogram with clinic visit due 10/12/24    3) Lifestyle Modifications were provided.   - Maintain your best healthy weight. Higher body fat and adult weight gain is associated with increased risk for breast cancer. This increase in risk has been attributed to increase in circulating endogenous estrogen levels from fat tissue.   - Any alcohol intake increases the risk for breast cancer. If you choose to drink alcohol limit alcohol consumption to less than 1 drink (1 ounce of liquor, 6 ounces of wine, or 8 ounces of beer) per day or less than 3 drinks per week.  - Be active daily and void being sedentary.   - Vitamin D may decrease the risk of developing breast cancer.     Nicole Goss PA-C    30 minutes spent on the date of the encounter doing chart review, review of test results, interpretation of tests, patient visit and documentation.

## 2023-11-28 ENCOUNTER — VIRTUAL VISIT (OUTPATIENT)
Dept: BEHAVIORAL HEALTH | Facility: CLINIC | Age: 33
End: 2023-11-28
Payer: COMMERCIAL

## 2023-11-28 DIAGNOSIS — F43.23 ADJUSTMENT DISORDER WITH MIXED ANXIETY AND DEPRESSED MOOD: Primary | ICD-10-CM

## 2023-11-28 PROCEDURE — 90834 PSYTX W PT 45 MINUTES: CPT | Mod: 93

## 2023-11-28 NOTE — PROGRESS NOTES
FOLLOW UP  Nov 30, 2023     Zara Kaminski is a 33 year old woman who presents with a history of right breast mastitis. She was referred by Dr. Hernandez.    HPI:    Family history of maternal aunt with breast cancer.     She is 2 months post partum and breast feeding. When she was 2 weeks post partum she developed right breast swelling and erythema in the superior portion of the breast. She then developed fever and was started on dicloxacillin. Her systemic symptoms and fever resolved. The erythema and swelling persistent. She underwent a mammogram and right breast ultrasound 10/11/23 that demonstrated a fluid collection. The fluid collection was aspirated and grew staph aureus resistant to erythromycin and clindamycin. The right breast mass and erythema persistent so she was admitted to the hospital 10/16/23-10/21/20 for IV antibiotics. She continued to be afebrile. During that hospitalization she underwent IR guided aspiration 10/20, cultures grew staph aureus resistant to clindamycin. She was discharged on Bactrim, she will completes a 4 week course in 2 days.  She had a right breast ultrasound last 11/8 that appeared improved.     Today she reports doing overall well and improving becoming less erythematous and firm. She denies fever.     BREAST-SPECIFIC HISTORY:    Previous breast imaging: Yes  - 10/11/23 b/l Dmammo and right breast ultrasound for pain and redness: 12:00 1 cm FN complex fluid collection measuring 5 cm, 5 ml of cloudy white fluid was aspirated BI-RADS 2: staph aureus resistant to erythromycin and clindamycin  - 10/16/23 right breast ultrasound: 12:00 2 cm FN complex fluid collection 5 ml of tan milky fluid aspirated  - 10/20/23 right breast ultrasound: 12:00 1cm FN complicated fluid collection 4.6 cm BI-RADS 2 aspirated staph aureus resistant to clindamycin  - 11/8/23 right breast ultrasound: 12:00 1 cm FN 1.5 cm fluid collection BI-RADS 2    Prior breast biopsies/surgeries: No  - 10/11/23 b/l  Dmammo and right breast ultrasound for pain and redness: 12:00 1 cm FN complex fluid collection measuring 5 cm, 5 ml of cloudy white fluid was aspirated BI-RADS 2 staph aureus resistant to erythromycin and clindamycin  - 10/16/23 right breast ultrasound: 12:00 2 cm FN complex fluid collection 5 ml of tan milky fluid aspirated  - 10/20/23 right breast ultrasound: 12:00 1cm FN complicated fluid collection 4.6 cm BI-RADS 2 aspirated staph aureus resistant to clindamycin  - 23 right breast ultrasound:     Prior history of breast cancer or DCIS: No  Prior radiation history: No (years treated)  Self breast exams: Yes  Breast density: heterogeneously dense    GYN HISTORY:  . Age at 1st pregnancy: 33. Breastfeeding history: Yes.   Age at menarche: 10  Menopausal: premenopausal.   Menopausal hormone replacement therapy: No     RISK ASSESSMENT: < 3% 5 year risk by Meka, < 5% 10 year risk by SHERLEY, and > 20% lifetime risk by SHERLEY  Meka: 0.4% 5 year risk   SHERLEY/Byron: 26.6% lifetime risk, 2.1% 10 year risk     FAMILY HISTORY:  Breast ca: Yes  - maternal aunt, 30 (aunts 2)  Ovarian ca: No  Pancreatic ca: No  Prostate: No  Gastric ca: No  Melanoma: No  Colon ca: No  Other cancer: No  Other genetic, testing, syndromes, or clotting disorders: no  - mother with negative genetic testing      PAST MEDICAL HISTORY  Past Medical History:   Diagnosis Date    ADHD (attention deficit hyperactivity disorder)     Mastitis 10/16/2023    Trauma and stressor-related disorder 2016     PAST SURGICAL HISTORY   Past Surgical History:   Procedure Laterality Date     SECTION N/A 9/10/2023    Procedure:  section;  Surgeon: Gretchen Hernandez MD;  Location: [facility]    IR FINE NEEDLE ASPIRATION W ULTRASOUND  10/20/2023    WISDOM TOOTH EXTRACTION       MEDICATIONS  Current Outpatient Medications   Medication Sig Dispense Refill    acetaminophen (TYLENOL) 325 MG tablet Take 2 tablets (650 mg) by mouth every 6 hours  as needed for mild pain 100 tablet 0    cyclobenzaprine (FLEXERIL) 10 MG tablet Take 1 tablet (10 mg) by mouth every 8 hours as needed for muscle spasms (Patient not taking: Reported on 9/26/2023) 12 tablet 0    escitalopram (LEXAPRO) 5 MG tablet Take 1 tablet (5 mg) by mouth daily for 90 days 90 tablet 3    ferrous sulfate (FEROSUL) 325 (65 Fe) MG tablet Take 1 tablet (325 mg) by mouth every other day 30 tablet 1    folic acid-vit B6-vit B12 (FOLGARD) 0.8-10-0.115 MG TABS per tablet Take by mouth daily (Patient not taking: Reported on 9/26/2023)      ibuprofen (ADVIL/MOTRIN) 600 MG tablet Take 1 tablet (600 mg) by mouth every 6 hours as needed for inflammatory pain, mild pain or fever 30 tablet 0    Misc. Devices (BREAST PUMP) MISC 1 each continuous prn (breast feeding) 1 each 0    mupirocin (BACTROBAN) 2 % external ointment Apply to nipples after feedings with plastic wrap.  No need to wash off. (Patient not taking: Reported on 11/3/2023) 30 g 1    norethindrone (MICRONOR) 0.35 MG tablet Take 1 tablet (0.35 mg) by mouth daily (Patient not taking: Reported on 9/26/2023) 84 tablet 3    Prenatal Vit-Fe Fumarate-FA (PRENATAL MULTIVITAMIN W/IRON) 27-0.8 MG tablet Take 1 tablet by mouth daily      senna-docusate (SENOKOT-S/PERICOLACE) 8.6-50 MG tablet Take 1 tablet by mouth daily Start after delivery. 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     ROS:   Change in vision No  Headaches: no  Respiratory: No shortness of breath, dyspnea on exertion, cough, or hemoptysis   Cardiovascular: negative   Gastrointestinal: negative Abdominal pain: no  Breast: negative  Musculoskeletal: negative Joint pain No Back pain: no  Psychiatric:  negative  Hematologic/Lymphatic/Immunologic: negative  Endocrine: negative    EXAM  /84 (BP Location: Left arm, Patient Position: Sitting, Cuff Size: Adult Regular)   Pulse 60   Temp 97.4  F (36.3  C) (Oral)   Resp 16   Wt 84.1 kg (185 lb 6.4 oz)   LMP 12/01/2022   SpO2 96%   BMI 28.26 kg/m     PHYSICAL EXAM  Respiratory: breathing non labored.   Breasts: Examination was done in both the upright and supine positions.  Breasts are symmetrical. No nipple inversion or change. No left breast mass or skin change. Right breast with very subtle mild erythema 12:00. Deep to this area is a 3x2 cm subtle firm tissue.   No clavicular, cervical, or axillary lymphadenopathy.     ASSESSMENT/PLAN:    Zara Kaminski is a 33 year old woman history of right breast mastitis, resolving. She has a family history of breast cancer and does meet NCCN guidelines for high risk screening.     1) Right breast mastitis, resolved  - Follow up as needed.     2) Family history of breast cancer  Family history of breast cancer  She meets NCCN guidelines for high risk screening based on family history with lifetime risk for breast cancer of >20%. Screening recommendations based on NCCN guidelines. Be familiar with your breast and promptly report any changes to your health care provider. Clinical encounter every 6-12 months starting now (but not prior to age 21). Annual mammogram with radha starting 10 years prior to the youngest family member but not less than age 30 or age 40 ( whichever comes first). Annual breast MRI to begin 10 years prior to the youngest family member diagnosed but not less than 25 years old or age 40 ( whichever comes first).  Counseling was provided with available strategies including lifestyle modifications and risk reducing intervention.   - Breast MRI with clinic visit due 4/2024  - Screening mammogram with clinic visit due 10/12/24    3) Lifestyle Modifications were provided.   - Maintain your best healthy  weight. Higher body fat and adult weight gain is associated with increased risk for breast cancer. This increase in risk has been attributed to increase in circulating endogenous estrogen levels from fat tissue.   - Any alcohol intake increases the risk for breast cancer. If you choose to drink alcohol limit alcohol consumption to less than 1 drink (1 ounce of liquor, 6 ounces of wine, or 8 ounces of beer) per day or less than 3 drinks per week.  - Be active daily and void being sedentary.   - Vitamin D may decrease the risk of developing breast cancer.     Nicole Goss PA-C    10 minutes spent on the date of the encounter doing chart review, review of test results, interpretation of tests, patient visit and documentation.

## 2023-11-28 NOTE — PROGRESS NOTES
"[facility]  November 28, 2023  Behavioral Health Clinician Progress Note    Patient Name: Zara Kaminski         Service Type:  Individual      Service Location:   Phone call (patient / identified key support person reached)     Session Start Time: 10:30 AM  Session End Time: 11:20 AM      Session Length: 38 - 52      Attendees: Patient     Service Modality:  Phone Visit:      Provider verified identity through the following two step process.  Patient provided:  Patient is known previously to provider    Telephone Visit: The patient's condition can be safely assessed and treated via synchronous audio telemedicine encounter.      Reason for Audio Telemedicine Visit: Patient convenience (e.g. access to timely appointments / distance to available provider)    Originating Site (Patient Location): Patient's [place]    [place] [facility] [place]:  The patient/guardian has verbally consented to:     1. The potential risks and benefits of telemedicine (telephone visit) versus in person care;    The patient has been notified of the following:      \"We have found that certain health care needs can be provided without the need for a face to face visit.  This service lets us provide the care you need with a phone conversation.       I will have full access to your [facility] medical record during this entire phone call.   I will be taking notes for your medical record.      Since this is like an office visit, we will bill your insurance company for this service.       There are potential benefits and risks of telephone visits (e.g. limits to patient confidentiality) that differ from in-person visits.?Confidentiality still applies for telephone services, and nobody will record the visit.  It is important to be in a quiet, private space that is free of distractions (including cell phone or other devices) during the visit.??      If during the course " "of the call I believe a telephone visit is not appropriate, you will not be charged for this service\"     Consent has been obtained for this service by care team member: Yes     Visit Activities (Refresh list every visit): [facility] Only and Phone Encounter    Diagnostic Assessment Date: 8/15/23  Treatment Plan Review Date: Reviewed 10/24, review due 1/24/24    DATA  Extended Session (60+ minutes): No  Interactive Complexity: No  Crisis: No  [facility] Patient: No    Treatment Objective(s) Addressed in This Session:  Target Behavior(s): adaptive postpartum adjustment; coping with grief    Current Stressors / Issues:  Patient reported she has been feeling a bit better emotionally since the last visit. Shared she is going back to work next week, some nervousness about managing changes in a new routine. Noted some additional stress as her baby is going to physical therapy, requires exercises at home. Reflected on how holidays will be different this year after her cousin was murdered; noted good family support and healthy distractions of parenthood but, appropriately, still feeling heavy grief.     Progress on Treatment Objective(s) / Homework:  Satisfactory progress - ACTION (Actively working towards change); Intervened by reinforcing change plan / affirming steps taken    Assessments completed prior to visit:  PHQ9:       7/11/2023    12:47 PM 8/11/2023    11:40 AM 9/26/2023    11:23 AM   PHQ-9 SCORE   PHQ-9 Total Score MyChart 8 (Mild depression)     PHQ-9 Total Score 8 3 5     GAD7:       7/11/2023    12:47 PM 9/26/2023    11:23 AM   MAHAD-7 SCORE   Total Score 6 (mild anxiety)    Total Score 6 2     PROMIS 10-Global Health (all questions and answers displayed): NEEDS UPDATE      7/11/2023    12:58 PM 11/28/2023     8:28 AM   PROMIS 10   In general, would you say your health is: Good Good   In general, would you say your quality of life is: Very good Very good   In general, how would you rate your physical health? Good Good   In " general, how would you rate your mental health, including your mood and your ability to think? Fair Good   In general, how would you rate your satisfaction with your social activities and relationships? Fair Very good   In general, please rate how well you carry out your usual social activities and roles Fair Good   To what extent are you able to carry out your everyday physical activities such as walking, climbing stairs, carrying groceries, or moving a chair? Mostly Mostly   In the past 7 days, how often have you been bothered by emotional problems such as feeling anxious, depressed, or irritable? Often Sometimes   In the past 7 days, how would you rate your fatigue on average? Moderate Mild   In the past 7 days, how would you rate your pain on average, where 0 means no pain, and 10 means worst imaginable pain? 3 2   In general, would you say your health is: 3 3   In general, would you say your quality of life is: 4 4   In general, how would you rate your physical health? 3 3   In general, how would you rate your mental health, including your mood and your ability to think? 2 3   In general, how would you rate your satisfaction with your social activities and relationships? 2 4   In general, please rate how well you carry out your usual social activities and roles. (This includes activities at home, at work and in your community, and responsibilities as a parent, child, spouse, employee, friend, etc.) 2 3   To what extent are you able to carry out your everyday physical activities such as walking, climbing stairs, carrying groceries, or moving a chair? 4 4   In the past 7 days, how often have you been bothered by emotional problems such as feeling anxious, depressed, or irritable? 4 3   In the past 7 days, how would you rate your fatigue on average? 3 2   In the past 7 days, how would you rate your pain on average, where 0 means no pain, and 10 means worst imaginable pain? 3 2   Global Mental Health Score 10 14    Global Physical Health Score 14 15   PROMIS TOTAL - SUBSCORES 24 29     Care Plan review completed: No    Medication Review:  No changes to current psychiatric medication(s)    Medication Compliance:  Yes    Changes in Health Issues:  Reports some improvement     Chemical Use Review:   Substance Use: Chemical use reviewed, no active concerns identified      Tobacco Use: No current tobacco use.      Assessment: Current Emotional / Mental Status (status of significant symptoms):  Risk status (Self / Other harm or suicidal ideation)  Patient denies a history of suicidal ideation, suicide attempts, self-injurious behavior, homicidal ideation, homicidal behavior, and and other safety concerns  Patient denies current fears or concerns for personal safety.  Patient denies current or recent suicidal ideation or behaviors.  Patient denies current or recent homicidal ideation or behaviors.  Patient denies current or recent self injurious behavior or ideation.  Patient denies other safety concerns.  A safety and risk management plan has not been developed at this time, however patient was encouraged to call [place] [phone] should there be a change in any of these risk factors.    Appearance:   Unable to assess   Eye Contact:   Unable to assess   Psychomotor Behavior: Unable to assess   Attitude:   Cooperative  Interested Pleasant  Orientation:   All  Speech   Rate / Production: Normal/ Responsive   Volume:  Normal   Mood:    Stable, intermittent anxiety/nervousness about situational stressors, intermittent grief   Affect:    Appropriate   Thought Content:  Clear   Thought Form:  Coherent  Goal Directed  Logical   Insight:    Good     Diagnoses:  1. Adjustment disorder with mixed anxiety and depressed mood        Collateral Reports Completed:  Not Applicable    Plan: (Homework, other):  Follow-up scheduled on 12/21. CD Recommendations: No indications of CD issues.     Montserrat Parrish,  LGSW    ______________________________________________________________________    Integrated Primary Care Behavioral Health Treatment Plan    Patient's Name: Zara Kaminski  YOB: 1990    Date of Creation: 8/28/23  Date Treatment Plan Last Reviewed/Revised: 10/24/23    DSM5 Diagnoses: Adjustment Disorders  309.28 (F43.23) With mixed anxiety and depressed mood  Psychosocial / Contextual Factors: patient had her first baby in September 2023; death of patient's cousin in May 2023  PROMIS (reviewed every 90 days): 29    Referral / Collaboration:  Referral for couples therapy in place    Anticipated number of session for this episode of care: 6  Anticipation frequency of session:  Every 2-4 weeks  Anticipated Duration of each session: 16-37 minutes  Treatment plan will be reviewed in 90 days or when goals have been changed.     MeasurableTreatment Goal(s) related to diagnosis / functional impairment(s)    Goal 1: Patient will reduce distress associated with postpartum adjustment.    Objective #A (Patient Action)    Patient will engage in adaptive cognitive and behavioral coping strategies to reduce symptoms of anxiety and depression.  Status: New - 10/24/23    Intervention(s)  Therapist will assist patient in identifying and challenging unhelpful cognitions, teach grounding and relaxation strategies.     Goal 2: Patient will reduce distress regarding plans for delivery, evidenced by patient report.     Objective #A (Patient Action)    Patient will identify factors contributing to feelings of depression and anxiety.  Status: Completed (no longer relevant)    Intervention(s)  Therapist will guide reflection on external events and cognitions related to stressors.    Objective #B  Patient will engage in adaptive coping strategies.   Status: Completed (no longer relevant)    Intervention(s)  Therapist will teach cognitive and behavioral skills to manage mental health symptoms.     Goal 3: Patient will process  loss in a manner that promotes normative grief reaction.    Objective #A (Patient Action)    Patient will discuss evolution of grief reaction in session, at a pace that feels comfortable for her.   Status: Continued - Date: 10/24/23    Intervention(s)  Therapist will support patient in reflecting on emotions within grief experience in session.     Patient has reviewed and agreed to the above plan.      WESLEY Burton  August 28, 2023

## 2023-11-30 ENCOUNTER — ONCOLOGY VISIT (OUTPATIENT)
Dept: SURGERY | Facility: CLINIC | Age: 33
End: 2023-11-30
Attending: PHYSICIAN ASSISTANT
Payer: COMMERCIAL

## 2023-11-30 VITALS
TEMPERATURE: 97.4 F | BODY MASS INDEX: 28.26 KG/M2 | OXYGEN SATURATION: 96 % | DIASTOLIC BLOOD PRESSURE: 84 MMHG | SYSTOLIC BLOOD PRESSURE: 124 MMHG | WEIGHT: 185.4 LBS | RESPIRATION RATE: 16 BRPM | HEART RATE: 60 BPM

## 2023-11-30 DIAGNOSIS — N61.0 MASTITIS: ICD-10-CM

## 2023-11-30 DIAGNOSIS — Z91.89 AT HIGH RISK FOR BREAST CANCER: Primary | ICD-10-CM

## 2023-11-30 PROCEDURE — 99212 OFFICE O/P EST SF 10 MIN: CPT | Performed by: PHYSICIAN ASSISTANT

## 2023-11-30 PROCEDURE — 99213 OFFICE O/P EST LOW 20 MIN: CPT | Performed by: PHYSICIAN ASSISTANT

## 2023-11-30 ASSESSMENT — PAIN SCALES - GENERAL: PAINLEVEL: NO PAIN (0)

## 2023-11-30 NOTE — NURSING NOTE
"Oncology Rooming Note    November 30, 2023 1:02 PM   Zara Kaminski is a 33 year old female who presents for:    Chief Complaint   Patient presents with    Oncology Clinic Visit     Initial Vitals: /84 (BP Location: Left arm, Patient Position: Sitting, Cuff Size: Adult Regular)   Pulse 60   Temp 97.4  F (36.3  C) (Oral)   Resp 16   Wt 84.1 kg (185 lb 6.4 oz)   LMP 12/01/2022   SpO2 96%   BMI 28.26 kg/m   Estimated body mass index is 28.26 kg/m  as calculated from the following:    Height as of 11/16/23: 1.725 m (5' 7.91\").    Weight as of this encounter: 84.1 kg (185 lb 6.4 oz). Body surface area is 2.01 meters squared.  No Pain (0) Comment: Data Unavailable   Patient's last menstrual period was 12/01/2022.  Allergies reviewed: Yes  Medications reviewed: Yes    Medications: Medication refills not needed today.  Pharmacy name entered into [facility]:    [facility] - [place], [place] - [address]  [facility] - [place], [place] - [address] [id number] - [place], [place] - [address]    Clinical concerns: None       Kendra Delarosa LPN  11/30/2023                "

## 2023-11-30 NOTE — LETTER
11/30/2023         RE: Zara Kaminski  [address]  [facility] [address]        Dear Colleague,    Thank you for referring your patient, Zara Kaminski, to the [facility]. Please see a copy of my visit note below.    FOLLOW UP  Nov 30, 2023     Zara Kaminski is a 33 year old woman who presents with a history of right breast mastitis. She was referred by Dr. Hernandez.    HPI:    Family history of maternal aunt with breast cancer.     She is 2 months post partum and breast feeding. When she was 2 weeks post partum she developed right breast swelling and erythema in the superior portion of the breast. She then developed fever and was started on dicloxacillin. Her systemic symptoms and fever resolved. The erythema and swelling persistent. She underwent a mammogram and right breast ultrasound 10/11/23 that demonstrated a fluid collection. The fluid collection was aspirated and grew staph aureus resistant to erythromycin and clindamycin. The right breast mass and erythema persistent so she was admitted to the hospital 10/16/23-10/21/20 for IV antibiotics. She continued to be afebrile. During that hospitalization she underwent IR guided aspiration 10/20, cultures grew staph aureus resistant to clindamycin. She was discharged on Bactrim, she will completes a 4 week course in 2 days.  She had a right breast ultrasound last 11/8 that appeared improved.     Today she reports doing overall well and improving becoming less erythematous and firm. She denies fever.     BREAST-SPECIFIC HISTORY:    Previous breast imaging: Yes  - 10/11/23 b/l Dmammo and right breast ultrasound for pain and redness: 12:00 1 cm FN complex fluid collection measuring 5 cm, 5 ml of cloudy white fluid was aspirated BI-RADS 2: staph aureus resistant to erythromycin and clindamycin  - 10/16/23 right breast ultrasound: 12:00 2 cm FN complex fluid collection 5 ml of tan milky fluid aspirated  - 10/20/23 right breast  ultrasound: 12:00 1cm FN complicated fluid collection 4.6 cm BI-RADS 2 aspirated staph aureus resistant to clindamycin  - 23 right breast ultrasound: 12:00 1 cm FN 1.5 cm fluid collection BI-RADS 2    Prior breast biopsies/surgeries: No  - 10/11/23 b/l Dmammo and right breast ultrasound for pain and redness: 12:00 1 cm FN complex fluid collection measuring 5 cm, 5 ml of cloudy white fluid was aspirated BI-RADS 2 staph aureus resistant to erythromycin and clindamycin  - 10/16/23 right breast ultrasound: 12:00 2 cm FN complex fluid collection 5 ml of tan milky fluid aspirated  - 10/20/23 right breast ultrasound: 12:00 1cm FN complicated fluid collection 4.6 cm BI-RADS 2 aspirated staph aureus resistant to clindamycin  - 23 right breast ultrasound:     Prior history of breast cancer or DCIS: No  Prior radiation history: No (years treated)  Self breast exams: Yes  Breast density: heterogeneously dense    GYN HISTORY:  . Age at 1st pregnancy: 33. Breastfeeding history: Yes.   Age at menarche: 10  Menopausal: premenopausal.   Menopausal hormone replacement therapy: No     RISK ASSESSMENT: < 3% 5 year risk by Meka, < 5% 10 year risk by SHERLEY, and > 20% lifetime risk by SHERLEY  Meka: 0.4% 5 year risk   SHERLEY/Byron: 26.6% lifetime risk, 2.1% 10 year risk     FAMILY HISTORY:  Breast ca: Yes  - maternal aunt, 30 (aunts 2)  Ovarian ca: No  Pancreatic ca: No  Prostate: No  Gastric ca: No  Melanoma: No  Colon ca: No  Other cancer: No  Other genetic, testing, syndromes, or clotting disorders: no  - mother with negative genetic testing      PAST MEDICAL HISTORY  Past Medical History:   Diagnosis Date    ADHD (attention deficit hyperactivity disorder)     Mastitis 10/16/2023    Trauma and stressor-related disorder 2016     PAST SURGICAL HISTORY   Past Surgical History:   Procedure Laterality Date     SECTION N/A 9/10/2023    Procedure:  section;  Surgeon: Gretchen Hernandez MD;  Location:   L+D    IR FINE NEEDLE ASPIRATION W ULTRASOUND  10/20/2023    WISDOM TOOTH EXTRACTION       MEDICATIONS  Current Outpatient Medications   Medication Sig Dispense Refill    acetaminophen (TYLENOL) 325 MG tablet Take 2 tablets (650 mg) by mouth every 6 hours as needed for mild pain 100 tablet 0    cyclobenzaprine (FLEXERIL) 10 MG tablet Take 1 tablet (10 mg) by mouth every 8 hours as needed for muscle spasms (Patient not taking: Reported on 9/26/2023) 12 tablet 0    escitalopram (LEXAPRO) 5 MG tablet Take 1 tablet (5 mg) by mouth daily for 90 days 90 tablet 3    ferrous sulfate (FEROSUL) 325 (65 Fe) MG tablet Take 1 tablet (325 mg) by mouth every other day 30 tablet 1    folic acid-vit B6-vit B12 (FOLGARD) 0.8-10-0.115 MG TABS per tablet Take by mouth daily (Patient not taking: Reported on 9/26/2023)      ibuprofen (ADVIL/MOTRIN) 600 MG tablet Take 1 tablet (600 mg) by mouth every 6 hours as needed for inflammatory pain, mild pain or fever 30 tablet 0    Misc. Devices (BREAST PUMP) MISC 1 each continuous prn (breast feeding) 1 each 0    mupirocin (BACTROBAN) 2 % external ointment Apply to nipples after feedings with plastic wrap.  No need to wash off. (Patient not taking: Reported on 11/3/2023) 30 g 1    norethindrone (MICRONOR) 0.35 MG tablet Take 1 tablet (0.35 mg) by mouth daily (Patient not taking: Reported on 9/26/2023) 84 tablet 3    Prenatal Vit-Fe Fumarate-FA (PRENATAL MULTIVITAMIN W/IRON) 27-0.8 MG tablet Take 1 tablet by mouth daily      senna-docusate (SENOKOT-S/PERICOLACE) 8.6-50 MG tablet Take 1 tablet by mouth daily Start after delivery. (Patient not taking: Reported on 11/3/2023) 100 tablet 0    sulfamethoxazole-trimethoprim (BACTRIM DS) 800-160 MG tablet Take 1 tablet by mouth 2 times daily 28 tablet 0      ALLERGIES   No Known Allergies     SOCIAL HISTORY:  Smokes: No  EtOH: No  Exercise: physical active   [place]     ROS:   Change in vision No  Headaches: no  Respiratory:  No shortness of breath, dyspnea on exertion, cough, or hemoptysis   Cardiovascular: negative   Gastrointestinal: negative Abdominal pain: no  Breast: negative  Musculoskeletal: negative Joint pain No Back pain: no  Psychiatric: negative  Hematologic/Lymphatic/Immunologic: negative  Endocrine: negative    EXAM  /84 (BP Location: Left arm, Patient Position: Sitting, Cuff Size: Adult Regular)   Pulse 60   Temp 97.4  F (36.3  C) (Oral)   Resp 16   Wt 84.1 kg (185 lb 6.4 oz)   LMP 12/01/2022   SpO2 96%   BMI 28.26 kg/m     PHYSICAL EXAM  Respiratory: breathing non labored.   Breasts: Examination was done in both the upright and supine positions.  Breasts are symmetrical. No nipple inversion or change. No left breast mass or skin change. Right breast with very subtle mild erythema 12:00. Deep to this area is a 3x2 cm subtle firm tissue.   No clavicular, cervical, or axillary lymphadenopathy.     ASSESSMENT/PLAN:    Zara Kaminski is a 33 year old woman history of right breast mastitis, resolving. She has a family history of breast cancer and does meet NCCN guidelines for high risk screening.     1) Right breast mastitis, resolved  - Follow up as needed.     2) Family history of breast cancer  Family history of breast cancer  She meets NCCN guidelines for high risk screening based on family history with lifetime risk for breast cancer of >20%. Screening recommendations based on NCCN guidelines. Be familiar with your breast and promptly report any changes to your health care provider. Clinical encounter every 6-12 months starting now (but not prior to age 21). Annual mammogram with radha starting 10 years prior to the youngest family member but not less than age 30 or age 40 ( whichever comes first). Annual breast MRI to begin 10 years prior to the youngest family member diagnosed but not less than 25 years old or age 40 ( whichever comes first).  Counseling was provided with available strategies including  lifestyle modifications and risk reducing intervention.   - Breast MRI with clinic visit due 4/2024  - Screening mammogram with clinic visit due 10/12/24    3) Lifestyle Modifications were provided.   - Maintain your best healthy weight. Higher body fat and adult weight gain is associated with increased risk for breast cancer. This increase in risk has been attributed to increase in circulating endogenous estrogen levels from fat tissue.   - Any alcohol intake increases the risk for breast cancer. If you choose to drink alcohol limit alcohol consumption to less than 1 drink (1 ounce of liquor, 6 ounces of wine, or 8 ounces of beer) per day or less than 3 drinks per week.  - Be active daily and void being sedentary.   - Vitamin D may decrease the risk of developing breast cancer.     Nicole Goss PA-C    10 minutes spent on the date of the encounter doing chart review, review of test results, interpretation of tests, patient visit and documentation.

## 2023-12-21 ENCOUNTER — VIRTUAL VISIT (OUTPATIENT)
Dept: BEHAVIORAL HEALTH | Facility: CLINIC | Age: 33
End: 2023-12-21
Payer: COMMERCIAL

## 2023-12-21 DIAGNOSIS — F43.21 ADJUSTMENT DISORDER WITH DEPRESSED MOOD: Primary | ICD-10-CM

## 2023-12-21 PROCEDURE — 90834 PSYTX W PT 45 MINUTES: CPT | Mod: 93

## 2023-12-21 NOTE — PROGRESS NOTES
"[facility]  December 21, 2023  Behavioral Health Clinician Progress Note    Patient Name: Zara Kaminski         Service Type:  Individual      Service Location:   Phone call (patient / identified key support person reached)     Session Start Time: 8:30 AM  Session End Time: 9:15 AM      Session Length: 38 - 52      Attendees: Patient     Service Modality:  Phone Visit:      Provider verified identity through the following two step process.  Patient provided:  Patient is known previously to provider    Telephone Visit: The patient's condition can be safely assessed and treated via synchronous audio telemedicine encounter.      Reason for Audio Telemedicine Visit: Patient convenience (e.g. access to timely appointments / distance to available provider)    Originating Site (Patient Location): Patient's home    Distant Site (Provider Location): [facility]    Consent:  The patient/guardian has verbally consented to:     1. The potential risks and benefits of telemedicine (telephone visit) versus in person care;    The patient has been notified of the following:      \"We have found that certain health care needs can be provided without the need for a face to face visit.  This service lets us provide the care you need with a phone conversation.       I will have full access to your [facility] medical record during this entire phone call.   I will be taking notes for your medical record.      Since this is like an office visit, we will bill your insurance company for this service.       There are potential benefits and risks of telephone visits (e.g. limits to patient confidentiality) that differ from in-person visits.?Confidentiality still applies for telephone services, and nobody will record the visit.  It is important to be in a quiet, private space that is free of distractions (including cell phone or other devices) during the visit.??      If during the course of " "the call I believe a telephone visit is not appropriate, you will not be charged for this service\"     Consent has been obtained for this service by care team member: Yes     Visit Activities (Refresh list every visit): [facility] Only and Phone Encounter    Diagnostic Assessment Date: 8/15/23  Treatment Plan Review Date: Reviewed 10/24, review due 1/24/24    DATA  Extended Session (60+ minutes): No  Interactive Complexity: No  Crisis: No  [facility] Patient: No    Treatment Objective(s) Addressed in This Session:  Target Behavior(s): adaptive postpartum adjustment; coping with grief    Current Stressors / Issues:  Patient returned to work since last visit, feels it's been going okay overall. Has mastitis again which is frustrating, but not as bad as she has experienced before. She shared that she has been having intermittent/spontaneous bouts of sadness/tearfulness, related this to ongoing grief process. Patient reflected on finding balance between grief and lety/excitement about the holidays.     Interventions: guided discussion of grief experience, validated patient perspectives, affirmed insight, inquired about ongoing support interests    Progress on Treatment Objective(s) / Homework:  Satisfactory progress - ACTION (Actively working towards change); Intervened by reinforcing change plan / affirming steps taken    Assessments completed prior to visit:  PHQ9:       7/11/2023    12:47 PM 8/11/2023    11:40 AM 9/26/2023    11:23 AM   PHQ-9 SCORE   PHQ-9 Total Score MyChart 8 (Mild depression)     PHQ-9 Total Score 8 3 5     GAD7:       7/11/2023    12:47 PM 9/26/2023    11:23 AM   MAHAD-7 SCORE   Total Score 6 (mild anxiety)    Total Score 6 2     PROMIS 10-Global Health (all questions and answers displayed): NEEDS UPDATE      7/11/2023    12:58 PM 11/28/2023     8:28 AM   PROMIS 10   In general, would you say your health is: Good Good   In general, would you say your quality of life is: Very good Very good   In general, how would " you rate your physical health? Good Good   In general, how would you rate your mental health, including your mood and your ability to think? Fair Good   In general, how would you rate your satisfaction with your social activities and relationships? Fair Very good   In general, please rate how well you carry out your usual social activities and roles Fair Good   To what extent are you able to carry out your everyday physical activities such as walking, climbing stairs, carrying groceries, or moving a chair? Mostly Mostly   In the past 7 days, how often have you been bothered by emotional problems such as feeling anxious, depressed, or irritable? Often Sometimes   In the past 7 days, how would you rate your fatigue on average? Moderate Mild   In the past 7 days, how would you rate your pain on average, where 0 means no pain, and 10 means worst imaginable pain? 3 2   In general, would you say your health is: 3 3   In general, would you say your quality of life is: 4 4   In general, how would you rate your physical health? 3 3   In general, how would you rate your mental health, including your mood and your ability to think? 2 3   In general, how would you rate your satisfaction with your social activities and relationships? 2 4   In general, please rate how well you carry out your usual social activities and roles. (This includes activities at home, at work and in your community, and responsibilities as a parent, child, spouse, employee, friend, etc.) 2 3   To what extent are you able to carry out your everyday physical activities such as walking, climbing stairs, carrying groceries, or moving a chair? 4 4   In the past 7 days, how often have you been bothered by emotional problems such as feeling anxious, depressed, or irritable? 4 3   In the past 7 days, how would you rate your fatigue on average? 3 2   In the past 7 days, how would you rate your pain on average, where 0 means no pain, and 10 means worst imaginable  pain? 3 2   Global Mental Health Score 10 14   Global Physical Health Score 14 15   PROMIS TOTAL - SUBSCORES 24 29     Care Plan review completed: No    Medication Review:  No changes to current psychiatric medication(s)    Medication Compliance:  Yes    Changes in Health Issues:  Mastitis     Chemical Use Review:   Substance Use: Chemical use reviewed, no active concerns identified      Tobacco Use: No current tobacco use.      Assessment: Current Emotional / Mental Status (status of significant symptoms):  Risk status (Self / Other harm or suicidal ideation)  Patient denies a history of suicidal ideation, suicide attempts, self-injurious behavior, homicidal ideation, homicidal behavior, and and other safety concerns  Patient denies current fears or concerns for personal safety.  Patient denies current or recent suicidal ideation or behaviors.  Patient denies current or recent homicidal ideation or behaviors.  Patient denies current or recent self injurious behavior or ideation.  Patient denies other safety concerns.  A safety and risk management plan has not been developed at this time, however patient was encouraged to call [place] [phone] should there be a change in any of these risk factors.    Appearance:   Unable to assess   Eye Contact:   Unable to assess   Psychomotor Behavior: Unable to assess   Attitude:   Cooperative  Interested Pleasant  Orientation:   All  Speech   Rate / Production: Normal/ Responsive   Volume:  Normal   Mood:    Stable, intermittent sadness/grief  Affect:    Appropriate   Thought Content:  Clear   Thought Form:  Coherent  Goal Directed  Logical   Insight:    Good     Diagnoses:  1. Adjustment disorder with depressed mood        Collateral Reports Completed:  Not Applicable    Plan: (Homework, other):  Follow-up scheduled on 1/3. [facility] and patient will discuss ongoing care plan at next visit. CD Recommendations: No indications of CD issues.     Montserrat Parrish, LEESA,  [facility]    ______________________________________________________________________    Integrated Primary Care Behavioral Health Treatment Plan    Patient's Name: Zara Kaminski  YOB: 1990    Date of Creation: 8/28/23  Date Treatment Plan Last Reviewed/Revised: 10/24/23    DSM5 Diagnoses: Adjustment Disorders  309.28 (F43.23) With mixed anxiety and depressed mood  Psychosocial / Contextual Factors: patient had her first baby in September 2023; death of patient's cousin in May 2023  PROMIS (reviewed every 90 days): 29    Referral / Collaboration:  Referral for couples therapy in place    Anticipated number of session for this episode of care: 6  Anticipation frequency of session:  Every 2-4 weeks  Anticipated Duration of each session: 16-37 minutes  Treatment plan will be reviewed in 90 days or when goals have been changed.     MeasurableTreatment Goal(s) related to diagnosis / functional impairment(s)    Goal 1: Patient will reduce distress associated with postpartum adjustment.    Objective #A (Patient Action)    Patient will engage in adaptive cognitive and behavioral coping strategies to reduce symptoms of anxiety and depression.  Status: New - 10/24/23    Intervention(s)  Therapist will assist patient in identifying and challenging unhelpful cognitions, teach grounding and relaxation strategies.     Goal 2: Patient will reduce distress regarding plans for delivery, evidenced by patient report.     Objective #A (Patient Action)    Patient will identify factors contributing to feelings of depression and anxiety.  Status: Completed (no longer relevant)    Intervention(s)  Therapist will guide reflection on external events and cognitions related to stressors.    Objective #B  Patient will engage in adaptive coping strategies.   Status: Completed (no longer relevant)    Intervention(s)  Therapist will teach cognitive and behavioral skills to manage mental health symptoms.     Goal 3: Patient will process  loss in a manner that promotes normative grief reaction.    Objective #A (Patient Action)    Patient will discuss evolution of grief reaction in session, at a pace that feels comfortable for her.   Status: Continued - Date: 10/24/23    Intervention(s)  Therapist will support patient in reflecting on emotions within grief experience in session.     Patient has reviewed and agreed to the above plan.      WESLEY Burton  August 28, 2023

## 2024-01-03 ENCOUNTER — VIRTUAL VISIT (OUTPATIENT)
Dept: BEHAVIORAL HEALTH | Facility: CLINIC | Age: 34
End: 2024-01-03
Payer: COMMERCIAL

## 2024-01-03 DIAGNOSIS — F43.21 ADJUSTMENT DISORDER WITH DEPRESSED MOOD: Primary | ICD-10-CM

## 2024-01-03 PROCEDURE — 90834 PSYTX W PT 45 MINUTES: CPT | Mod: 93

## 2024-01-03 NOTE — Clinical Note
Hi Rebeka, here is the patient I messaged you about last week. I'm happy to connect further to discuss her current concerns. Thank you again!

## 2024-01-03 NOTE — PROGRESS NOTES
"[facility]  January 3, 2023  Behavioral Health Clinician Progress Note    Patient Name: Zara Kaminski         Service Type:  Individual      Service Location:   Phone call (patient / identified key support person reached)     Session Start Time: 3:00 PM  Session End Time: 3:50 PM      Session Length: 38 - 52      Attendees: Patient     Service Modality:  Phone Visit:      Provider verified identity through the following two step process.  Patient provided:  Patient is known previously to provider    Telephone Visit: The patient's condition can be safely assessed and treated via synchronous audio telemedicine encounter.      Reason for Audio Telemedicine Visit: Patient convenience (e.g. access to timely appointments / distance to available provider)    Originating Site (Patient Location): Patient's [place]    [place] [facility] [place]:  The patient/guardian has verbally consented to:     1. The potential risks and benefits of telemedicine (telephone visit) versus in person care;    The patient has been notified of the following:      \"We have found that certain health care needs can be provided without the need for a face to face visit.  This service lets us provide the care you need with a phone conversation.       I will have full access to your [facility] medical record during this entire phone call.   I will be taking notes for your medical record.      Since this is like an office visit, we will bill your insurance company for this service.       There are potential benefits and risks of telephone visits (e.g. limits to patient confidentiality) that differ from in-person visits.?Confidentiality still applies for telephone services, and nobody will record the visit.  It is important to be in a quiet, private space that is free of distractions (including cell phone or other devices) during the visit.??      If during the course of " "the call I believe a telephone visit is not appropriate, you will not be charged for this service\"     Consent has been obtained for this service by care team member: Yes     Visit Activities (Refresh list every visit): [facility] Only and Phone Encounter    Diagnostic Assessment Date: 8/15/23  Treatment Plan Review Date: Reviewed 10/24, review due 1/24/24    DATA  Extended Session (60+ minutes): No  Interactive Complexity: No  Crisis: No  [facility] Patient: No    Treatment Objective(s) Addressed in This Session:  Target Behavior(s): adaptive postpartum adjustment; coping with grief; addressing relationship concerns    Current Stressors / Issues:  Patient shared that the holidays were fairly difficult, reflected on grief experience. She also described current relationship stressors and how challenging dynamics have impacted emotional well-being. [facility] and patient discussed support interests including individual and couples counseling. Patient was open to referral to [facility] colleague following this writer's resignation.     Interventions: validated patient's emotional experience, explored ongoing grief process, reinforced patient's solution-focused perspectives, described ongoing care options    Progress on Treatment Objective(s) / Homework:  Satisfactory progress - ACTION (Actively working towards change); Intervened by reinforcing change plan / affirming steps taken    Assessments completed prior to visit:  PHQ9:       7/11/2023    12:47 PM 8/11/2023    11:40 AM 9/26/2023    11:23 AM   PHQ-9 SCORE   PHQ-9 Total Score MyChart 8 (Mild depression)     PHQ-9 Total Score 8 3 5     GAD7:       7/11/2023    12:47 PM 9/26/2023    11:23 AM   MAHAD-7 SCORE   Total Score 6 (mild anxiety)    Total Score 6 2     PROMIS 10-Global Health (all questions and answers displayed): NEEDS UPDATE      7/11/2023    12:58 PM 11/28/2023     8:28 AM   PROMIS 10   In general, would you say your health is: Good Good   In general, would you say your quality of " life is: Very good Very good   In general, how would you rate your physical health? Good Good   In general, how would you rate your mental health, including your mood and your ability to think? Fair Good   In general, how would you rate your satisfaction with your social activities and relationships? Fair Very good   In general, please rate how well you carry out your usual social activities and roles Fair Good   To what extent are you able to carry out your everyday physical activities such as walking, climbing stairs, carrying groceries, or moving a chair? Mostly Mostly   In the past 7 days, how often have you been bothered by emotional problems such as feeling anxious, depressed, or irritable? Often Sometimes   In the past 7 days, how would you rate your fatigue on average? Moderate Mild   In the past 7 days, how would you rate your pain on average, where 0 means no pain, and 10 means worst imaginable pain? 3 2   In general, would you say your health is: 3 3   In general, would you say your quality of life is: 4 4   In general, how would you rate your physical health? 3 3   In general, how would you rate your mental health, including your mood and your ability to think? 2 3   In general, how would you rate your satisfaction with your social activities and relationships? 2 4   In general, please rate how well you carry out your usual social activities and roles. (This includes activities at home, at work and in your community, and responsibilities as a parent, child, spouse, employee, friend, etc.) 2 3   To what extent are you able to carry out your everyday physical activities such as walking, climbing stairs, carrying groceries, or moving a chair? 4 4   In the past 7 days, how often have you been bothered by emotional problems such as feeling anxious, depressed, or irritable? 4 3   In the past 7 days, how would you rate your fatigue on average? 3 2   In the past 7 days, how would you rate your pain on average,  where 0 means no pain, and 10 means worst imaginable pain? 3 2   Global Mental Health Score 10 14   Global Physical Health Score 14 15   PROMIS TOTAL - SUBSCORES 24 29     Care Plan review completed: No    Medication Review:  No changes to current psychiatric medication(s)    Medication Compliance:  Yes    Changes in Health Issues:  None reported    Chemical Use Review:   Substance Use: Chemical use reviewed, no active concerns identified      Tobacco Use: No current tobacco use.      Assessment: Current Emotional / Mental Status (status of significant symptoms):  Risk status (Self / Other harm or suicidal ideation)  Patient denies a history of suicidal ideation, suicide attempts, self-injurious behavior, homicidal ideation, homicidal behavior, and and other safety concerns  Patient denies current fears or concerns for personal safety.  Patient denies current or recent suicidal ideation or behaviors.  Patient denies current or recent homicidal ideation or behaviors.  Patient denies current or recent self injurious behavior or ideation.  Patient denies other safety concerns.  A safety and risk management plan has not been developed at this time, however patient was encouraged to call [place] [phone] should there be a change in any of these risk factors.    Appearance:   Unable to assess   Eye Contact:   Unable to assess   Psychomotor Behavior: Unable to assess   Attitude:   Cooperative  Interested Pleasant  Orientation:   All  Speech   Rate / Production: Normal/ Responsive   Volume:  Normal   Mood:    Dysphoric  Affect:    Appropriate   Thought Content:  Clear   Thought Form:  Coherent  Goal Directed  Logical   Insight:    Good     Diagnoses:  1. Adjustment disorder with depressed mood        Collateral Reports Completed:  Communicated with: LEESA Venegas, [facility] to discuss care transfer    Plan: (Homework, other):  Follow-up scheduled on 1/23. [facility] will facilitate care transfer to [facility] colleague. SABRINA  Recommendations: No indications of CD issues.     Montserrat Parrish, [facility], [facility]    ______________________________________________________________________    Integrated Primary Care Behavioral Health Treatment Plan    Patient's Name: Zara Kaminski  YOB: 1990    Date of Creation: 8/28/23  Date Treatment Plan Last Reviewed/Revised: 10/24/23    DSM5 Diagnoses: Adjustment Disorders  309.28 (F43.23) With mixed anxiety and depressed mood  Psychosocial / Contextual Factors: patient had her first baby in September 2023; death of patient's cousin in May 2023  PROMIS (reviewed every 90 days): 29    Referral / Collaboration:  Referral for couples therapy in place    Anticipated number of session for this episode of care: 6  Anticipation frequency of session:  Every 2-4 weeks  Anticipated Duration of each session: 16-37 minutes  Treatment plan will be reviewed in 90 days or when goals have been changed.     MeasurableTreatment Goal(s) related to diagnosis / functional impairment(s)    Goal 1: Patient will reduce distress associated with postpartum adjustment.    Objective #A (Patient Action)    Patient will engage in adaptive cognitive and behavioral coping strategies to reduce symptoms of anxiety and depression.  Status: New - 10/24/23    Intervention(s)  Therapist will assist patient in identifying and challenging unhelpful cognitions, teach grounding and relaxation strategies.     Goal 2: Patient will reduce distress regarding plans for delivery, evidenced by patient report.     Objective #A (Patient Action)    Patient will identify factors contributing to feelings of depression and anxiety.  Status: Completed (no longer relevant)    Intervention(s)  Therapist will guide reflection on external events and cognitions related to stressors.    Objective #B  Patient will engage in adaptive coping strategies.   Status: Completed (no longer relevant)    Intervention(s)  Therapist will teach cognitive and behavioral  skills to manage mental health symptoms.     Goal 3: Patient will process loss in a manner that promotes normative grief reaction.    Objective #A (Patient Action)    Patient will discuss evolution of grief reaction in session, at a pace that feels comfortable for her.   Status: Continued - Date: 10/24/23    Intervention(s)  Therapist will support patient in reflecting on emotions within grief experience in session.     Patient has reviewed and agreed to the above plan.      WESLEY Burton  August 28, 2023

## 2024-01-08 ENCOUNTER — TELEPHONE (OUTPATIENT)
Dept: BEHAVIORAL HEALTH | Facility: CLINIC | Age: 34
End: 2024-01-08
Payer: COMMERCIAL

## 2024-01-08 NOTE — TELEPHONE ENCOUNTER
Outreach made to pt to offer [facility] appt per the request of Montserrat JASIEL. Left voicemail with number for scheduling. Byclert message also sent.    ARIANNA Venegas, [facility]  Behavioral Health Clinician  [facility]

## 2024-01-23 ENCOUNTER — VIRTUAL VISIT (OUTPATIENT)
Dept: BEHAVIORAL HEALTH | Facility: CLINIC | Age: 34
End: 2024-01-23
Payer: COMMERCIAL

## 2024-01-23 DIAGNOSIS — F43.23 ADJUSTMENT DISORDER WITH MIXED ANXIETY AND DEPRESSED MOOD: Primary | ICD-10-CM

## 2024-01-23 PROCEDURE — 90832 PSYTX W PT 30 MINUTES: CPT | Mod: 93

## 2024-01-23 NOTE — PROGRESS NOTES
"[facility]  January 23, 2024  Behavioral Health Clinician Progress Note    Patient Name: Zara Kaminski         Service Type:  Individual      Service Location:   Phone call (patient / identified key support person reached)     Session Start Time: 9:45 AM  Session End Time: 10:20 AM      Session Length: 16 - 37      Attendees: Patient     Service Modality:  Phone Visit:      Provider verified identity through the following two step process.  Patient provided:  Patient is known previously to provider    Telephone Visit: The patient's condition can be safely assessed and treated via synchronous audio telemedicine encounter.      Reason for Audio Telemedicine Visit: Patient convenience (e.g. access to timely appointments / distance to available provider)    Originating Site (Patient Location): Patient's home    Distant Site (Provider Location): [facility] [place]    Consent:  The patient/guardian has verbally consented to:     1. The potential risks and benefits of telemedicine (telephone visit) versus in person care;    The patient has been notified of the following:      \"We have found that certain health care needs can be provided without the need for a face to face visit.  This service lets us provide the care you need with a phone conversation.       I will have full access to your [facility] medical record during this entire phone call.   I will be taking notes for your medical record.      Since this is like an office visit, we will bill your insurance company for this service.       There are potential benefits and risks of telephone visits (e.g. limits to patient confidentiality) that differ from in-person visits.?Confidentiality still applies for telephone services, and nobody will record the visit.  It is important to be in a quiet, private space that is free of distractions (including cell phone or other devices) during the visit.??      If during the course of " "the call I believe a telephone visit is not appropriate, you will not be charged for this service\"     Consent has been obtained for this service by care team member: Yes     Visit Activities (Refresh list every visit): [facility] Only and Phone Encounter    Diagnostic Assessment Date: 8/15/23  Treatment Plan Review Date: Reviewed today with end of care episode     DATA  Extended Session (60+ minutes): No  Interactive Complexity: No  Crisis: No  [facility] Patient: No    Treatment Objective(s) Addressed in This Session:  Target Behavior(s): adaptive postpartum adjustment; coping with grief    Current Stressors / Issues:  Patient reported she's been feeling a lot better since the last [facility] visit. She noted that she and her  are in a good place - associated this improvement in part to more quality time and getting more sleep. Her baby started  which has been going well. Patient described intentions to be more present when spending time with the people she wants to support, rather than over-thinking about whether she is doing enough/offering support in an appropriate way. [facility] affirmed patient's ability to fact-check unhelpful worries, assisted in additional re-framing. [facility] and patient discussed ongoing care plan; patient declined to continue with [facility] colleague at this time due to financial factors. She expressed belief that she is in a good place, sought advice on how to continue promoting mental health outside of therapy. [facility] suggested mindfulness practice, checking in with herself about emotional/stress states.   Closed [facility] care episode.     Progress on Treatment Objective(s) / Homework:  Satisfactory progress - ACTION (Actively working towards change); Intervened by reinforcing change plan / affirming steps taken    Assessments completed prior to visit:  PHQ9:       7/11/2023    12:47 PM 8/11/2023    11:40 AM 9/26/2023    11:23 AM   PHQ-9 SCORE   PHQ-9 Total Score MyChart 8 (Mild depression)     PHQ-9 Total Score 8 3 " 5     GAD7:       7/11/2023    12:47 PM 9/26/2023    11:23 AM   MAHAD-7 SCORE   Total Score 6 (mild anxiety)    Total Score 6 2     PROMIS 10-Global Health (all questions and answers displayed): NEEDS UPDATE      7/11/2023    12:58 PM 11/28/2023     8:28 AM   PROMIS 10   In general, would you say your health is: Good Good   In general, would you say your quality of life is: Very good Very good   In general, how would you rate your physical health? Good Good   In general, how would you rate your mental health, including your mood and your ability to think? Fair Good   In general, how would you rate your satisfaction with your social activities and relationships? Fair Very good   In general, please rate how well you carry out your usual social activities and roles Fair Good   To what extent are you able to carry out your everyday physical activities such as walking, climbing stairs, carrying groceries, or moving a chair? Mostly Mostly   In the past 7 days, how often have you been bothered by emotional problems such as feeling anxious, depressed, or irritable? Often Sometimes   In the past 7 days, how would you rate your fatigue on average? Moderate Mild   In the past 7 days, how would you rate your pain on average, where 0 means no pain, and 10 means worst imaginable pain? 3 2   In general, would you say your health is: 3 3   In general, would you say your quality of life is: 4 4   In general, how would you rate your physical health? 3 3   In general, how would you rate your mental health, including your mood and your ability to think? 2 3   In general, how would you rate your satisfaction with your social activities and relationships? 2 4   In general, please rate how well you carry out your usual social activities and roles. (This includes activities at home, at work and in your community, and responsibilities as a parent, child, spouse, employee, friend, etc.) 2 3   To what extent are you able to carry out your  everyday physical activities such as walking, climbing stairs, carrying groceries, or moving a chair? 4 4   In the past 7 days, how often have you been bothered by emotional problems such as feeling anxious, depressed, or irritable? 4 3   In the past 7 days, how would you rate your fatigue on average? 3 2   In the past 7 days, how would you rate your pain on average, where 0 means no pain, and 10 means worst imaginable pain? 3 2   Global Mental Health Score 10 14   Global Physical Health Score 14 15   PROMIS TOTAL - SUBSCORES 24 29     Care Plan review completed: Yes    Medication Review:  No changes to current psychiatric medication(s)    Medication Compliance:  Yes    Changes in Health Issues:  None reported    Chemical Use Review:   Substance Use: Chemical use reviewed, no active concerns identified      Tobacco Use: No current tobacco use.      Assessment: Current Emotional / Mental Status (status of significant symptoms):  Risk status (Self / Other harm or suicidal ideation)  Patient denies a history of suicidal ideation, suicide attempts, self-injurious behavior, homicidal ideation, homicidal behavior, and and other safety concerns  Patient denies current fears or concerns for personal safety.  Patient denies current or recent suicidal ideation or behaviors.  Patient denies current or recent homicidal ideation or behaviors.  Patient denies current or recent self injurious behavior or ideation.  Patient denies other safety concerns.  A safety and risk management plan has not been developed at this time, however patient was encouraged to call [facility] should there be a change in any of these risk factors.    Appearance:   Unable to assess   Eye Contact:   Unable to assess   Psychomotor Behavior: Unable to assess   Attitude:   Cooperative  Interested Pleasant  Orientation:   All  Speech   Rate / Production: Normal/ Responsive   Volume:  Normal   Mood:    Stable/euthymic  Affect:    Appropriate    Thought Content:  Clear   Thought Form:  Coherent  Goal Directed  Logical   Insight:    Good     Diagnoses:  1. Adjustment disorder with mixed anxiety and depressed mood      Collateral Reports Completed:  Not Applicable    Plan: (Homework, other):  No follow-up scheduled, closed [facility] care episode. [facility] shared resources for mindfulness practice via [employer]. Also shared information about [employer]. CD Recommendations: No indications of CD issues.     Montserrat Parrish, [facility], [facility]    ______________________________________________________________________    Integrated Primary Care Behavioral Health Treatment Plan    Patient's Name: Zara Kaminski  YOB: 1990    Date of Creation: 8/28/23  Date Treatment Plan Last Reviewed/Revised: 1/23/24    DSM5 Diagnoses: Adjustment Disorders  309.28 (F43.23) With mixed anxiety and depressed mood  Psychosocial / Contextual Factors: patient had her first baby in September 2023; death of patient's cousin in May 2023  PROMIS (reviewed every 90 days): 29    Referral / Collaboration:  N/a - patient may pursue mental health services at a future date    Anticipated number of session for this episode of care: n/a - closed care episode  Anticipation frequency of session: n/a  Anticipated Duration of each session: n/a  Treatment plan will be reviewed in 90 days or when goals have been changed.     MeasurableTreatment Goal(s) related to diagnosis / functional impairment(s)    Goal 1: Patient will reduce distress associated with postpartum adjustment.    Objective #A (Patient Action)    Patient will engage in adaptive cognitive and behavioral coping strategies to reduce symptoms of anxiety and depression.  Status: Completed - 1/23/24    Intervention(s)  Therapist will assist patient in identifying and challenging unhelpful cognitions, teach grounding and relaxation strategies.     Goal 2: Patient will reduce distress regarding plans for delivery, evidenced by  patient report.     Objective #A (Patient Action)    Patient will identify factors contributing to feelings of depression and anxiety.  Status: Completed (no longer relevant)    Intervention(s)  Therapist will guide reflection on external events and cognitions related to stressors.    Objective #B  Patient will engage in adaptive coping strategies.   Status: Completed (no longer relevant)    Intervention(s)  Therapist will teach cognitive and behavioral skills to manage mental health symptoms.     Goal 3: Patient will process loss in a manner that promotes normative grief reaction.    Objective #A (Patient Action)    Patient will discuss evolution of grief reaction in session, at a pace that feels comfortable for her.   Status: Continued - 1/23/24    Intervention(s)  Therapist will support patient in reflecting on emotions within grief experience in session.     Patient has reviewed and agreed to the above plan.      Montserrat Parrish, LEESA, [facility] August 28, 2023 1/23/24

## 2024-04-11 ENCOUNTER — MYC MEDICAL ADVICE (OUTPATIENT)
Dept: SURGERY | Facility: CLINIC | Age: 34
End: 2024-04-11
Payer: COMMERCIAL

## 2024-04-11 DIAGNOSIS — F06.4 ANXIETY DISORDER DUE TO KNOWN PHYSIOLOGICAL CONDITION: Primary | ICD-10-CM

## 2024-04-12 RX ORDER — DIAZEPAM 5 MG
5 TABLET ORAL 2 TIMES DAILY PRN
Qty: 2 TABLET | Refills: 0 | Status: SHIPPED | OUTPATIENT
Start: 2024-04-12

## 2024-04-18 ENCOUNTER — ANCILLARY PROCEDURE (OUTPATIENT)
Dept: MRI IMAGING | Facility: CLINIC | Age: 34
End: 2024-04-18
Attending: PHYSICIAN ASSISTANT
Payer: COMMERCIAL

## 2024-04-18 DIAGNOSIS — Z91.89 AT HIGH RISK FOR BREAST CANCER: ICD-10-CM

## 2024-04-18 PROCEDURE — 2894A MR BREAST BILATERAL W/O & W CONTRAST: CPT

## 2024-04-18 PROCEDURE — A9585 GADOBUTROL INJECTION: HCPCS

## 2024-04-18 RX ORDER — GADOBUTROL 604.72 MG/ML
7.5 INJECTION INTRAVENOUS ONCE
Status: DISCONTINUED | OUTPATIENT
Start: 2024-04-18 | End: 2024-04-18

## 2024-04-18 RX ORDER — GADOBUTROL 604.72 MG/ML
10 INJECTION INTRAVENOUS ONCE
Status: COMPLETED | OUTPATIENT
Start: 2024-04-18 | End: 2024-04-18

## 2024-04-18 RX ADMIN — GADOBUTROL 8.5 ML: 604.72 INJECTION INTRAVENOUS at 11:52

## 2024-04-23 NOTE — PROGRESS NOTES
FOLLOW UP  2024     Zara Kaminski is a 34 year old woman who presents with family history of breast cancer.    HPI:    Family history of maternal aunt with breast cancer.     History of right breast mastitis.     She had a breast MRI yesterday that was normal. She denies any breast mass, skin change, nipple inversion or nipple discharge.     BREAST-SPECIFIC HISTORY:    Previous breast imaging: Yes  - 10/11/23 b/l Dmammo and right breast ultrasound for pain and redness: 12:00 1 cm FN complex fluid collection measuring 5 cm, 5 ml of cloudy white fluid was aspirated BI-RADS 2: staph aureus resistant to erythromycin and clindamycin  - 10/16/23 right breast ultrasound: 12:00 2 cm FN complex fluid collection 5 ml of tan milky fluid aspirated  - 10/20/23 right breast ultrasound: 12:00 1cm FN complicated fluid collection 4.6 cm BI-RADS 2 aspirated staph aureus resistant to clindamycin  - 23 right breast ultrasound: 12:00 1 cm FN 1.5 cm fluid collection BI-RADS 2  - 24 breast MRI BI-RADS 1    Prior breast biopsies/surgeries: No  - 10/11/23 b/l Dmammo and right breast ultrasound for pain and redness: 12:00 1 cm FN complex fluid collection measuring 5 cm, 5 ml of cloudy white fluid was aspirated BI-RADS 2 staph aureus resistant to erythromycin and clindamycin  - 10/16/23 right breast ultrasound: 12:00 2 cm FN complex fluid collection 5 ml of tan milky fluid aspirated  - 10/20/23 right breast ultrasound: 12:00 1cm FN complicated fluid collection 4.6 cm BI-RADS 2 aspirated staph aureus resistant to clindamycin  - 23 right breast ultrasound: 12:00 1 cm FN 1.6 cm fluid collection BI-RADS 2    Prior history of breast cancer or DCIS: No  Prior radiation history: No (years treated)  Self breast exams: Yes  Breast density: heterogeneously dense    GYN HISTORY:  . Age at 1st pregnancy: 33. Breastfeeding history: Yes.   Age at menarche: 10  Menopausal: premenopausal.   Menopausal hormone replacement therapy:  No     RISK ASSESSMENT: < 3% 5 year risk by Meka, < 5% 10 year risk by SHERLEY, and > 20% lifetime risk by SHERLEY  Meka: 0.5% 5 year risk   SHERLEY/Byron: 26.0% lifetime risk, 2.4% 10 year risk     FAMILY HISTORY:  Breast ca: Yes  - maternal aunt, 30 (aunts 2)  Ovarian ca: No  Pancreatic ca: No  Prostate: No  Gastric ca: No  Melanoma: No  Colon ca: No  Other cancer: No  Other genetic, testing, syndromes, or clotting disorders: no  - mother with negative genetic testing      PAST MEDICAL HISTORY  Past Medical History:   Diagnosis Date    ADHD (attention deficit hyperactivity disorder)     Mastitis 10/16/2023    Trauma and stressor-related disorder 2016     PAST SURGICAL HISTORY   Past Surgical History:   Procedure Laterality Date     SECTION N/A 9/10/2023    Procedure:  section;  Surgeon: Gretchen Hernandez MD;  [facility]    IR FINE NEEDLE ASPIRATION W ULTRASOUND  10/20/2023    WISDOM TOOTH EXTRACTION       MEDICATIONS  Current Outpatient Medications   Medication Sig Dispense Refill    acetaminophen (TYLENOL) 325 MG tablet Take 2 tablets (650 mg) by mouth every 6 hours as needed for mild pain 100 tablet 0    amphetamine-dextroamphetamine (ADDERALL) 20 MG tablet       ferrous sulfate (FEROSUL) 325 (65 Fe) MG tablet Take 1 tablet (325 mg) by mouth every other day 30 tablet 1    ibuprofen (ADVIL/MOTRIN) 600 MG tablet Take 1 tablet (600 mg) by mouth every 6 hours as needed for inflammatory pain, mild pain or fever 30 tablet 0    Misc. Devices (BREAST PUMP) MISC 1 each continuous prn (breast feeding) 1 each 0    Prenatal Vit-Fe Fumarate-FA (PRENATAL MULTIVITAMIN W/IRON) 27-0.8 MG tablet Take 1 tablet by mouth daily      cyclobenzaprine (FLEXERIL) 10 MG tablet Take 1 tablet (10 mg) by mouth every 8 hours as needed for muscle spasms (Patient not taking: Reported on 2023) 12 tablet 0    diazepam (VALIUM) 5 MG tablet Take 1 tablet (5 mg) by mouth 2 times daily as needed for anxiety (breast  MRI) Take 1 tablet 5 mg by mouth 30 minutes prior to the breast MRI. Take a second additional dose prn if directed by the technician. (Patient not taking: Reported on 4/25/2024) 2 tablet 0    escitalopram (LEXAPRO) 5 MG tablet Take 1 tablet (5 mg) by mouth daily for 90 days 90 tablet 3    folic acid-vit B6-vit B12 (FOLGARD) 0.8-10-0.115 MG TABS per tablet Take by mouth daily (Patient not taking: Reported on 9/26/2023)      mupirocin (BACTROBAN) 2 % external ointment Apply to nipples after feedings with plastic wrap.  No need to wash off. (Patient not taking: Reported on 11/3/2023) 30 g 1    norethindrone (MICRONOR) 0.35 MG tablet Take 1 tablet (0.35 mg) by mouth daily (Patient not taking: Reported on 9/26/2023) 84 tablet 3    senna-docusate (SENOKOT-S/PERICOLACE) 8.6-50 MG tablet Take 1 tablet by mouth daily Start after delivery. (Patient not taking: Reported on 11/3/2023) 100 tablet 0    sulfamethoxazole-trimethoprim (BACTRIM DS) 800-160 MG tablet Take 1 tablet by mouth 2 times daily (Patient not taking: Reported on 4/25/2024) 28 tablet 0   Escitalopram     ALLERGIES   No Known Allergies     SOCIAL HISTORY:  Smokes: No  EtOH: No  Exercise: physical active   [place]     ROS:   Change in vision No  Headaches: no  Respiratory: No shortness of breath, dyspnea on exertion, cough, or hemoptysis   Cardiovascular: negative   Gastrointestinal: negative Abdominal pain: no  Breast: negative  Musculoskeletal: negative Joint pain No Back pain: no  Psychiatric: negative  Hematologic/Lymphatic/Immunologic: negative  Endocrine: negative    EXAM  /69 (BP Location: Right arm, Patient Position: Sitting, Cuff Size: Adult Regular)   Pulse 59   Temp 97.7  F (36.5  C) (Oral)   Resp 16   Wt 72.8 kg (160 lb 8 oz)   SpO2 98%   BMI 24.47 kg/m     PHYSICAL EXAM  Respiratory: breathing non labored.   Breasts: Examination was done in both the upright and supine positions.  Breasts are symmetrical. No  nipple inversion or change. Skin changes. No mass.   No clavicular, cervical, or axillary lymphadenopathy. Right axillary 1.5 cm lymph node.     ASSESSMENT/PLAN:    Zara Kaminski is a 34 year old woman with family history of breast cancer. She meets NCCN guidelines for high risk screening.     1) Family history of breast cancer  Family history of breast cancer  She meets NCCN guidelines for high risk screening based on family history with lifetime risk for breast cancer of >20%. Screening recommendations based on NCCN guidelines. Be familiar with your breast and promptly report any changes to your health care provider. Clinical encounter every 6-12 months starting now (but not prior to age 21). Annual mammogram with radha starting 10 years prior to the youngest family member but not less than age 30 or age 40 ( whichever comes first). Annual breast MRI to begin 10 years prior to the youngest family member diagnosed but not less than 25 years old or age 40 ( whichever comes first).  Counseling was provided with available strategies including lifestyle modifications and risk reducing intervention.   - Screening mammogram with clinic visit due 10/12/24  - Breast MRI with clinic visit due 4/25/25, valium needed.     2) Lifestyle Modifications were provided.   - Maintain your best healthy weight. Higher body fat and adult weight gain is associated with increased risk for breast cancer. This increase in risk has been attributed to increase in circulating endogenous estrogen levels from fat tissue.   - Any alcohol intake increases the risk for breast cancer. If you choose to drink alcohol limit alcohol consumption to less than 1 drink (1 ounce of liquor, 6 ounces of wine, or 8 ounces of beer) per day or less than 3 drinks per week.  - Be active daily and void being sedentary.   - Vitamin D may decrease the risk of developing breast cancer.     Nicole Goss PA-C    10 minutes spent on the date of the encounter doing chart  review, review of test results, interpretation of tests, patient visit and documentation.

## 2024-04-24 ENCOUNTER — ANCILLARY PROCEDURE (OUTPATIENT)
Dept: MRI IMAGING | Facility: CLINIC | Age: 34
End: 2024-04-24
Attending: PHYSICIAN ASSISTANT
Payer: COMMERCIAL

## 2024-04-24 DIAGNOSIS — Z91.89 AT HIGH RISK FOR BREAST CANCER: ICD-10-CM

## 2024-04-24 PROCEDURE — A9585 GADOBUTROL INJECTION: HCPCS

## 2024-04-24 PROCEDURE — 77049 MRI BREAST C-+ W/CAD BI: CPT

## 2024-04-24 RX ORDER — GADOBUTROL 604.72 MG/ML
10 INJECTION INTRAVENOUS ONCE
Status: COMPLETED | OUTPATIENT
Start: 2024-04-24 | End: 2024-04-24

## 2024-04-24 RX ADMIN — GADOBUTROL 8.5 ML: 604.72 INJECTION INTRAVENOUS at 08:36

## 2024-04-24 NOTE — DISCHARGE INSTRUCTIONS
MRI Contrast Discharge Instructions    The IV contrast you received today will pass out of your body in your  urine. This will happen in the next 24 hours. You will not feel this process.  Your urine will not change color.    Drink at least 4 extra glasses of water or juice today (unless your doctor  has restricted your fluids). This reduces the stress on your kidneys.  You may take your regular medicines.    If you are on dialysis: It is best to have dialysis today.    If you have a reaction: Most reactions happen right away. If you have  any new symptoms after leaving the hospital (such as hives or swelling),  call your hospital at the correct number below. Or call your family doctor.  If you have breathing distress or wheezing, call 911.    Special instructions: ***    I have read and understand the above information.    Signature:______________________________________ Date:___________    Staff:__________________________________________ Date:___________     Time:__________    [facility] Departments:    ___[place]: [phone]  ___[facility]: [phone]  ___[place]: [phone] ___[facility]: [phone]  ___[facility]: [phone]  ___[facility]: [phone]  ___[phone]  ___[facility]: [phone]  ___[phone]

## 2024-04-25 ENCOUNTER — ONCOLOGY VISIT (OUTPATIENT)
Dept: SURGERY | Facility: CLINIC | Age: 34
End: 2024-04-25
Attending: PHYSICIAN ASSISTANT
Payer: COMMERCIAL

## 2024-04-25 VITALS
RESPIRATION RATE: 16 BRPM | TEMPERATURE: 97.7 F | BODY MASS INDEX: 24.47 KG/M2 | WEIGHT: 160.5 LBS | OXYGEN SATURATION: 98 % | SYSTOLIC BLOOD PRESSURE: 115 MMHG | HEART RATE: 59 BPM | DIASTOLIC BLOOD PRESSURE: 69 MMHG

## 2024-04-25 DIAGNOSIS — Z91.89 AT HIGH RISK FOR BREAST CANCER: Primary | ICD-10-CM

## 2024-04-25 PROCEDURE — 99213 OFFICE O/P EST LOW 20 MIN: CPT | Performed by: PHYSICIAN ASSISTANT

## 2024-04-25 PROCEDURE — 99214 OFFICE O/P EST MOD 30 MIN: CPT | Performed by: PHYSICIAN ASSISTANT

## 2024-04-25 RX ORDER — DEXTROAMPHETAMINE SACCHARATE, AMPHETAMINE ASPARTATE, DEXTROAMPHETAMINE SULFATE AND AMPHETAMINE SULFATE 5; 5; 5; 5 MG/1; MG/1; MG/1; MG/1
TABLET ORAL
COMMUNITY
Start: 2024-01-04

## 2024-04-25 ASSESSMENT — PAIN SCALES - GENERAL: PAINLEVEL: NO PAIN (0)

## 2024-04-25 NOTE — PATIENT INSTRUCTIONS
Zara Kaminski is a 34 year old woman with family history of breast cancer. She meets NCCN guidelines for high risk screening.     1) Family history of breast cancer  Family history of breast cancer  She meets NCCN guidelines for high risk screening based on family history with lifetime risk for breast cancer of >20%. Screening recommendations based on NCCN guidelines. Be familiar with your breast and promptly report any changes to your health care provider. Clinical encounter every 6-12 months starting now (but not prior to age 21). Annual mammogram with radha starting 10 years prior to the youngest family member but not less than age 30 or age 40 ( whichever comes first). Annual breast MRI to begin 10 years prior to the youngest family member diagnosed but not less than 25 years old or age 40 ( whichever comes first).  Counseling was provided with available strategies including lifestyle modifications and risk reducing intervention.   - Screening mammogram with clinic visit due 10/12/24  - Breast MRI with clinic visit due 4/25/25, valium needed.     2) Lifestyle Modifications were provided.   - Maintain your best healthy weight. Higher body fat and adult weight gain is associated with increased risk for breast cancer. This increase in risk has been attributed to increase in circulating endogenous estrogen levels from fat tissue.   - Any alcohol intake increases the risk for breast cancer. If you choose to drink alcohol limit alcohol consumption to less than 1 drink (1 ounce of liquor, 6 ounces of wine, or 8 ounces of beer) per day or less than 3 drinks per week.  - Be active daily and void being sedentary.   - Vitamin D may decrease the risk of developing breast cancer.

## 2024-04-25 NOTE — LETTER
4/25/2024         RE: Zara Kaminski  [address]  [place] [facility] [address]        Dear Colleague,    Thank you for referring your patient, Zara Kaminski, to the [facility]. Please see a copy of my visit note below.    FOLLOW UP  Apr 25, 2024     Zara Kaminski is a 34 year old woman who presents with family history of breast cancer.    HPI:    Family history of maternal aunt with breast cancer.     History of right breast mastitis.     She had a breast MRI yesterday that was normal. She denies any breast mass, skin change, nipple inversion or nipple discharge.     BREAST-SPECIFIC HISTORY:    Previous breast imaging: Yes  - 10/11/23 b/l Dmammo and right breast ultrasound for pain and redness: 12:00 1 cm FN complex fluid collection measuring 5 cm, 5 ml of cloudy white fluid was aspirated BI-RADS 2: staph aureus resistant to erythromycin and clindamycin  - 10/16/23 right breast ultrasound: 12:00 2 cm FN complex fluid collection 5 ml of tan milky fluid aspirated  - 10/20/23 right breast ultrasound: 12:00 1cm FN complicated fluid collection 4.6 cm BI-RADS 2 aspirated staph aureus resistant to clindamycin  - 11/8/23 right breast ultrasound: 12:00 1 cm FN 1.5 cm fluid collection BI-RADS 2  - 4/24/24 breast MRI BI-RADS 1    Prior breast biopsies/surgeries: No  - 10/11/23 b/l Dmammo and right breast ultrasound for pain and redness: 12:00 1 cm FN complex fluid collection measuring 5 cm, 5 ml of cloudy white fluid was aspirated BI-RADS 2 staph aureus resistant to erythromycin and clindamycin  - 10/16/23 right breast ultrasound: 12:00 2 cm FN complex fluid collection 5 ml of tan milky fluid aspirated  - 10/20/23 right breast ultrasound: 12:00 1cm FN complicated fluid collection 4.6 cm BI-RADS 2 aspirated staph aureus resistant to clindamycin  - 11/8/23 right breast ultrasound: 12:00 1 cm FN 1.6 cm fluid collection BI-RADS 2    Prior history of breast cancer or DCIS: No  Prior radiation  history: No (years treated)  Self breast exams: Yes  Breast density: heterogeneously dense    GYN HISTORY:  . Age at 1st pregnancy: 33. Breastfeeding history: Yes.   Age at menarche: 10  Menopausal: premenopausal.   Menopausal hormone replacement therapy: No     RISK ASSESSMENT: < 3% 5 year risk by Meka, < 5% 10 year risk by SHERLEY, and > 20% lifetime risk by SHERLEY  Meka: 0.5% 5 year risk   SHERLEY/Byron: 26.0% lifetime risk, 2.4% 10 year risk     FAMILY HISTORY:  Breast ca: Yes  - maternal aunt, 30 (aunts 2)  Ovarian ca: No  Pancreatic ca: No  Prostate: No  Gastric ca: No  Melanoma: No  Colon ca: No  Other cancer: No  Other genetic, testing, syndromes, or clotting disorders: no  - mother with negative genetic testing      PAST MEDICAL HISTORY  Past Medical History:   Diagnosis Date    ADHD (attention deficit hyperactivity disorder)     Mastitis 10/16/2023    Trauma and stressor-related disorder 2016     PAST SURGICAL HISTORY   Past Surgical History:   Procedure Laterality Date     SECTION N/A 9/10/2023    Procedure:  section;  Surgeon: Gretchen Hernandez MD;  [facility]    IR FINE NEEDLE ASPIRATION W ULTRASOUND  10/20/2023    WISDOM TOOTH EXTRACTION       MEDICATIONS  Current Outpatient Medications   Medication Sig Dispense Refill    acetaminophen (TYLENOL) 325 MG tablet Take 2 tablets (650 mg) by mouth every 6 hours as needed for mild pain 100 tablet 0    amphetamine-dextroamphetamine (ADDERALL) 20 MG tablet       ferrous sulfate (FEROSUL) 325 (65 Fe) MG tablet Take 1 tablet (325 mg) by mouth every other day 30 tablet 1    ibuprofen (ADVIL/MOTRIN) 600 MG tablet Take 1 tablet (600 mg) by mouth every 6 hours as needed for inflammatory pain, mild pain or fever 30 tablet 0    Misc. Devices (BREAST PUMP) MISC 1 each continuous prn (breast feeding) 1 each 0    Prenatal Vit-Fe Fumarate-FA (PRENATAL MULTIVITAMIN W/IRON) 27-0.8 MG tablet Take 1 tablet by mouth daily      cyclobenzaprine  (FLEXERIL) 10 MG tablet Take 1 tablet (10 mg) by mouth every 8 hours as needed for muscle spasms (Patient not taking: Reported on 9/26/2023) 12 tablet 0    diazepam (VALIUM) 5 MG tablet Take 1 tablet (5 mg) by mouth 2 times daily as needed for anxiety (breast MRI) Take 1 tablet 5 mg by mouth 30 minutes prior to the breast MRI. Take a second additional dose prn if directed by the technician. (Patient not taking: Reported on 4/25/2024) 2 tablet 0    escitalopram (LEXAPRO) 5 MG tablet Take 1 tablet (5 mg) by mouth daily for 90 days 90 tablet 3    folic acid-vit B6-vit B12 (FOLGARD) 0.8-10-0.115 MG TABS per tablet Take by mouth daily (Patient not taking: Reported on 9/26/2023)      mupirocin (BACTROBAN) 2 % external ointment Apply to nipples after feedings with plastic wrap.  No need to wash off. (Patient not taking: Reported on 11/3/2023) 30 g 1    norethindrone (MICRONOR) 0.35 MG tablet Take 1 tablet (0.35 mg) by mouth daily (Patient not taking: Reported on 9/26/2023) 84 tablet 3    senna-docusate (SENOKOT-S/PERICOLACE) 8.6-50 MG tablet Take 1 tablet by mouth daily Start after delivery. (Patient not taking: Reported on 11/3/2023) 100 tablet 0    sulfamethoxazole-trimethoprim (BACTRIM DS) 800-160 MG tablet Take 1 tablet by mouth 2 times daily (Patient not taking: Reported on 4/25/2024) 28 tablet 0   Escitalopram     ALLERGIES   No Known Allergies     SOCIAL HISTORY:  Smokes: No  EtOH: No  Exercise: physical active   [place]     ROS:   Change in vision No  Headaches: no  Respiratory: No shortness of breath, dyspnea on exertion, cough, or hemoptysis   Cardiovascular: negative   Gastrointestinal: negative Abdominal pain: no  Breast: negative  Musculoskeletal: negative Joint pain No Back pain: no  Psychiatric: negative  Hematologic/Lymphatic/Immunologic: negative  Endocrine: negative    EXAM  /69 (BP Location: Right arm, Patient Position: Sitting, Cuff Size: Adult Regular)   Pulse 59    Temp 97.7  F (36.5  C) (Oral)   Resp 16   Wt 72.8 kg (160 lb 8 oz)   SpO2 98%   BMI 24.47 kg/m     PHYSICAL EXAM  Respiratory: breathing non labored.   Breasts: Examination was done in both the upright and supine positions.  Breasts are symmetrical. No nipple inversion or change. Skin changes. No mass.   No clavicular, cervical, or axillary lymphadenopathy.     ASSESSMENT/PLAN:    Zara Kaminski is a 34 year old woman with family history of breast cancer. She meets NCCN guidelines for high risk screening.     1) Family history of breast cancer  Family history of breast cancer  She meets NCCN guidelines for high risk screening based on family history with lifetime risk for breast cancer of >20%. Screening recommendations based on NCCN guidelines. Be familiar with your breast and promptly report any changes to your health care provider. Clinical encounter every 6-12 months starting now (but not prior to age 21). Annual mammogram with radha starting 10 years prior to the youngest family member but not less than age 30 or age 40 ( whichever comes first). Annual breast MRI to begin 10 years prior to the youngest family member diagnosed but not less than 25 years old or age 40 ( whichever comes first).  Counseling was provided with available strategies including lifestyle modifications and risk reducing intervention.   - Screening mammogram with clinic visit due 10/12/24  - Breast MRI with clinic visit due 4/25/25, valium needed.     2) Lifestyle Modifications were provided.   - Maintain your best healthy weight. Higher body fat and adult weight gain is associated with increased risk for breast cancer. This increase in risk has been attributed to increase in circulating endogenous estrogen levels from fat tissue.   - Any alcohol intake increases the risk for breast cancer. If you choose to drink alcohol limit alcohol consumption to less than 1 drink (1 ounce of liquor, 6 ounces of wine, or 8 ounces of beer) per day or  less than 3 drinks per week.  - Be active daily and void being sedentary.   - Vitamin D may decrease the risk of developing breast cancer.     Nicole Goss PA-C    10 minutes spent on the date of the encounter doing chart review, review of test results, interpretation of tests, patient visit and documentation.

## 2024-04-25 NOTE — NURSING NOTE
"Oncology Rooming Note    April 25, 2024 8:59 AM   Zara Kaminski is a 34 year old female who presents for:    Chief Complaint   Patient presents with    Oncology Clinic Visit     At high risk for breast cancer     Initial Vitals: /69 (BP Location: Right arm, Patient Position: Sitting, Cuff Size: Adult Regular)   Pulse 59   Temp 97.7  F (36.5  C) (Oral)   Resp 16   Wt 72.8 kg (160 lb 8 oz)   SpO2 98%   BMI 24.47 kg/m   Estimated body mass index is 24.47 kg/m  as calculated from the following:    Height as of 11/16/23: 1.725 m (5' 7.91\").    Weight as of this encounter: 72.8 kg (160 lb 8 oz). Body surface area is 1.87 meters squared.  No Pain (0) Comment: Data Unavailable   No LMP recorded.  Allergies reviewed: Yes  Medications reviewed: Yes    Medications: Medication refills not needed today.  Pharmacy name entered into [employer]:    [facility] [place] - [place], [place] - [address]  [facility] - [place], [place] - [address] [id number] - [place], [place] - [address]    Frailty Screening:   Is the patient here for a new oncology consult visit in cancer care? 2. No      Clinical concerns: none    Mala Michelle, EMT  4/25/2024              "

## 2024-06-30 ENCOUNTER — HEALTH MAINTENANCE LETTER (OUTPATIENT)
Age: 34
End: 2024-06-30

## 2024-07-07 ENCOUNTER — MYC MEDICAL ADVICE (OUTPATIENT)
Dept: OBGYN | Facility: CLINIC | Age: 34
End: 2024-07-07
Payer: COMMERCIAL

## 2024-07-07 DIAGNOSIS — N61.0 MASTITIS: Primary | ICD-10-CM

## 2024-07-08 RX ORDER — DICLOXACILLIN SODIUM 500 MG
500 CAPSULE ORAL 4 TIMES DAILY
Qty: 28 CAPSULE | Refills: 0 | Status: SHIPPED | OUTPATIENT
Start: 2024-07-08 | End: 2024-07-15

## 2025-01-23 ENCOUNTER — TELEPHONE (OUTPATIENT)
Dept: FAMILY MEDICINE | Facility: CLINIC | Age: 35
End: 2025-01-23

## 2025-01-23 ENCOUNTER — E-VISIT (OUTPATIENT)
Dept: URGENT CARE | Facility: CLINIC | Age: 35
End: 2025-01-23
Payer: COMMERCIAL

## 2025-01-23 ENCOUNTER — OFFICE VISIT (OUTPATIENT)
Dept: URGENT CARE | Facility: URGENT CARE | Age: 35
End: 2025-01-23
Payer: COMMERCIAL

## 2025-01-23 VITALS
OXYGEN SATURATION: 99 % | DIASTOLIC BLOOD PRESSURE: 81 MMHG | BODY MASS INDEX: 23.54 KG/M2 | RESPIRATION RATE: 16 BRPM | TEMPERATURE: 98.1 F | HEART RATE: 102 BPM | HEIGHT: 67 IN | SYSTOLIC BLOOD PRESSURE: 127 MMHG | WEIGHT: 150 LBS

## 2025-01-23 DIAGNOSIS — B30.9 VIRAL CONJUNCTIVITIS: Primary | ICD-10-CM

## 2025-01-23 DIAGNOSIS — H10.32 ACUTE CONJUNCTIVITIS OF LEFT EYE, UNSPECIFIED ACUTE CONJUNCTIVITIS TYPE: Primary | ICD-10-CM

## 2025-01-23 RX ORDER — OFLOXACIN 3 MG/ML
1-2 SOLUTION/ DROPS OPHTHALMIC 4 TIMES DAILY
Qty: 5 ML | Refills: 0 | Status: SHIPPED | OUTPATIENT
Start: 2025-01-23 | End: 2025-01-28

## 2025-01-23 ASSESSMENT — PAIN SCALES - GENERAL: PAINLEVEL_OUTOF10: MILD PAIN (3)

## 2025-01-23 NOTE — PATIENT INSTRUCTIONS
Dear Zara Kaminski,    We are sorry you are not feeling well. Based on the responses you provided, it is recommended that you be seen in-person in urgent care so we can better evaluate your symptoms. Please click here to find the nearest urgent care location to you.   You will not be charged for this Visit. Thank you for trusting us with your care.    Genet Phan PA-C
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
## 2025-01-23 NOTE — PATIENT INSTRUCTIONS
Use eyedrops for 3 to 5 days until symptoms resolve do not use contacts until your symptoms of completely gone away      You can use artificial tears/eye lubricant for mild eye irritation        If your cough worsens at any point or develop a new fever or should your cough persist for another week without any improvement please call your doctors office or return to be evaluated

## 2025-02-03 ENCOUNTER — OFFICE VISIT (OUTPATIENT)
Dept: URGENT CARE | Facility: URGENT CARE | Age: 35
End: 2025-02-03
Payer: COMMERCIAL

## 2025-02-03 ENCOUNTER — E-VISIT (OUTPATIENT)
Dept: URGENT CARE | Facility: CLINIC | Age: 35
End: 2025-02-03
Payer: COMMERCIAL

## 2025-02-03 VITALS
OXYGEN SATURATION: 98 % | TEMPERATURE: 98.1 F | SYSTOLIC BLOOD PRESSURE: 107 MMHG | HEIGHT: 67 IN | WEIGHT: 150 LBS | DIASTOLIC BLOOD PRESSURE: 75 MMHG | HEART RATE: 84 BPM | RESPIRATION RATE: 16 BRPM | BODY MASS INDEX: 23.54 KG/M2

## 2025-02-03 DIAGNOSIS — R05.9 COUGH, UNSPECIFIED TYPE: Primary | ICD-10-CM

## 2025-02-03 DIAGNOSIS — J01.40 ACUTE NON-RECURRENT PANSINUSITIS: Primary | ICD-10-CM

## 2025-02-03 PROCEDURE — 99207 PR NON-BILLABLE SERV PER CHARTING: CPT | Performed by: NURSE PRACTITIONER

## 2025-02-03 PROCEDURE — 99213 OFFICE O/P EST LOW 20 MIN: CPT | Performed by: PHYSICIAN ASSISTANT

## 2025-02-03 RX ORDER — AMOXICILLIN 875 MG/1
875 TABLET, COATED ORAL 2 TIMES DAILY
Qty: 20 TABLET | Refills: 0 | Status: SHIPPED | OUTPATIENT
Start: 2025-02-03 | End: 2025-02-13

## 2025-02-03 RX ORDER — FLUTICASONE PROPIONATE 50 MCG
1 SPRAY, SUSPENSION (ML) NASAL DAILY
Qty: 18.2 ML | Refills: 0 | Status: SHIPPED | OUTPATIENT
Start: 2025-02-03

## 2025-02-03 NOTE — PROGRESS NOTES
Acute non-recurrent pansinusitis  - fluticasone (FLONASE) 50 MCG/ACT nasal spray; Spray 1 spray into both nostrils daily.  - amoxicillin (AMOXIL) 875 MG tablet; Take 1 tablet (875 mg) by mouth 2 times daily for 10 days.    General Tips for All Ages:    Nasal Irrigation:  Why: Clears nasal passages and reduces congestion.  What to Do:  Use a saline nasal spray or a neti pot to rinse your nasal passages.    Warm Compress:  Why: Eases sinus pain and promotes drainage.  What to Do:  Apply a warm compress over your sinuses for relief.    Stay Hydrated:  Why: Hydration helps thin mucus.  What to Do:  Drink plenty of water, herbal teas, or clear broths.    For Children (Under 12 Years):    OTC Saline Nasal Drops:  Why: Clears nasal passages gently.  What to Do:  Administer saline drops as directed by your child's pediatrician.    OTC Acetaminophen or Ibuprofen (if approved by pediatrician):  Why: Manages pain and reduces fever.  What to Do:  Give the recommended dose as per your child's pediatrician.    For Adolescents (12-17 Years):    OTC Decongestants (if approved by healthcare provider):  Why: Helps reduce nasal congestion.  What to Do:  Use decongestant medications cautiously, following your healthcare provider's advice.    Stay Active:  Why: Physical activity can aid sinus drainage.  What to Do:  Engage in light exercise as tolerated.    For Adults (18 Years and Older):    OTC Nasal Decongestant Sprays (use for a short duration):  Why: Provides quick relief for nasal congestion.  What to Do:  Use as directed on the package and for a limited time to avoid rebound congestion.    OTC Pain Relievers (Acetaminophen or Ibuprofen):  Why: Manages pain and reduces inflammation.  What to Do:  Take the recommended dose as directed.    All Ages:    Humidifier Use:  Why: Adds moisture to the air, easing congestion.  What to Do:  Use a humidifier in your room, especially while sleeping.    Elevate Your Head:  Why: Helps with  sinus drainage.  What to Do:  Use an extra pillow to elevate your head while sleeping.    Avoid Irritants:  Why: Prevents worsening of symptoms.  What to Do:  Stay away from tobacco smoke and other irritants.    Warm Salt Gargle:  Why: Soothes a sore throat.  What to Do:  Gargle with warm salt water several times a day.    Rest and Relaxation:  Why: Aids in recovery.  What to Do:  Get adequate rest to allow your body to heal.    Seek Medical Attention:    Patient advised to return to clinic for reevaluation (either UC or PCP) if symptoms do not improve in 7 days. Patient educated on red flag symptoms and asked to go directly to the ED if these symptoms present themselves.     Remember, each case is unique, and it's essential to tailor these recommendations to your specific situation. If you have concerns or need further guidance, don't hesitate to reach out to your healthcare provider.    Wishing you a speedy recovery!      Ken Miller PA-C  [facility]    Subjective   34 year old who presents to clinic today for the following health issues:    Urgent Care, Fever, and Cough       HPI     Acute Illness  Acute illness concerns: Pt in clinic to have eval for cough and fever for 5 days. Patient has had ongoing congestion.   Onset/Duration: 5 days of worsening symptoms but congestion and runny nose for 2 weeks.   Symptoms:  Fever: YES  Chills/Sweats: YES  Headache (location?): No  Sinus Pressure: YES  Conjunctivitis:  No  Ear Pain: no  Rhinorrhea: YES  Congestion: YES  Sore Throat: No  Cough: YES  Wheeze: No  Decreased Appetite: No  Nausea: No  Vomiting: Post-tussive   Diarrhea: No  Dysuria/Freq.: No  Dysuria or Hematuria: No  Fatigue/Achiness: YES  Sick/Strep Exposure: Family has been sick   Therapies tried and outcome: Ibuprofen     Review of Systems   Review of Systems   See HPI    Objective    Temp: 98.1  F (36.7  C) Temp src: Temporal BP: 107/75 Pulse: 84   Resp: 16 SpO2: 98 %       Physical  Exam   Physical Exam  Constitutional:       General: She is not in acute distress.     Appearance: Normal appearance. She is normal weight. She is not ill-appearing, toxic-appearing or diaphoretic.   HENT:      Head: Normocephalic and atraumatic.      Right Ear: Tympanic membrane, ear canal and external ear normal. There is no impacted cerumen.      Left Ear: Tympanic membrane, ear canal and external ear normal. There is no impacted cerumen.      Nose: Congestion and rhinorrhea present.      Right Sinus: Maxillary sinus tenderness and frontal sinus tenderness present.      Left Sinus: Maxillary sinus tenderness and frontal sinus tenderness present.      Mouth/Throat:      Mouth: Mucous membranes are moist.      Pharynx: No oropharyngeal exudate or posterior oropharyngeal erythema.   Cardiovascular:      Rate and Rhythm: Normal rate and regular rhythm.      Pulses: Normal pulses.      Heart sounds: Normal heart sounds. No murmur heard.     No friction rub. No gallop.   Pulmonary:      Effort: Pulmonary effort is normal. No respiratory distress.      Breath sounds: No stridor. No wheezing, rhonchi or rales.   Chest:      Chest wall: No tenderness.   Lymphadenopathy:      Cervical: No cervical adenopathy.   Neurological:      General: No focal deficit present.      Mental Status: She is alert and oriented to person, place, and time. Mental status is at baseline.      Gait: Gait normal.   Psychiatric:         Mood and Affect: Mood normal.         Behavior: Behavior normal.         Thought Content: Thought content normal.         Judgment: Judgment normal.          No results found for this or any previous visit (from the past 24 hours).

## 2025-02-03 NOTE — PATIENT INSTRUCTIONS
Dear Zara Kaminski,    We are sorry you are not feeling well. Based on the responses you provided, it is recommended that you be seen in-person in urgent care so we can better evaluate your symptoms. Please click here to find the nearest urgent care location to you.   You will not be charged for this Visit. Thank you for trusting us with your care.    JASPREET Dacosta CNP

## 2025-06-08 ENCOUNTER — E-VISIT (OUTPATIENT)
Dept: URGENT CARE | Facility: CLINIC | Age: 35
End: 2025-06-08
Payer: COMMERCIAL

## 2025-06-08 DIAGNOSIS — R19.7 DIARRHEA OF PRESUMED INFECTIOUS ORIGIN: Primary | ICD-10-CM

## 2025-06-08 NOTE — PATIENT INSTRUCTIONS
Thank you for choosing us for your care. Based on your symptoms and length of illness, you most likely have either a food bourne or a viral cause for your diarrhea.  Typically these improve over the course of 1-3 days.  Sometimes vomiting can occur as well.  Antibiotics are not recommended for this, and we typically recommend avoiding medications like immodium as that can prolong the illness.  Drink fluids to stay hydrated.    If you're not feeling better within 3 days, or if you become dehydrated, you should be seen for an in person visit with your doctor or in urgent care  You can schedule an appointment right here in [facility], or call [phone]  If the visit is for the same symptoms as your eVisit, we'll refund the cost of your eVisit if seen within seven days   Diarrhea: Care Instructions  Overview     Diarrhea is loose, watery stools (bowel movements). The exact cause is often hard to find. Sometimes diarrhea is your body's way of getting rid of what caused an upset stomach. Viruses, food poisoning, and many medicines can cause diarrhea. Some people get diarrhea in response to emotional stress, anxiety, or certain foods.  Almost everyone has diarrhea now and then. It usually isn't serious, and your stools will return to normal soon. The important thing to do is replace the fluids you have lost, so you can prevent dehydration.  The doctor has checked you carefully, but problems can develop later. If you notice any problems or new symptoms, get medical treatment right away.  Follow-up care is a key part of your treatment and safety. Be sure to make and go to all appointments, and call your doctor if you are having problems. It's also a good idea to know your test results and keep a list of the medicines you take.  How can you care for yourself at home?  Watch for signs of dehydration, which means your body has lost too much water. Dehydration is a serious condition and should be treated right away. Signs of  "dehydration are:  Increasing thirst and dry eyes and mouth.  Feeling faint or lightheaded.  A smaller amount of urine than normal.  To prevent dehydration, drink plenty of fluids. Choose water and other clear liquids until you feel better. If you have kidney, heart, or liver disease and have to limit fluids, talk with your doctor before you increase the amount of fluids you drink.  When you feel like eating, start with small amounts of food.  The doctor may recommend that you take over-the-counter medicine, such as loperamide (Imodium). Read and follow all instructions on the label. Do not use this medicine if you have bloody diarrhea, a high fever, or other signs of serious illness. Call your doctor if you think you are having a problem with your medicine.  When should you call for help?   Call 911 anytime you think you may need emergency care. For example, call if:    You passed out (lost consciousness).     Your stools are maroon or very bloody.   Call your doctor now or seek immediate medical care if:    You are dizzy or lightheaded, or you feel like you may faint.     Your stools are black and look like tar, or they have streaks of blood.     You have new or worse belly pain.     You have symptoms of dehydration, such as:  Dry eyes and a dry mouth.  Passing only a little urine.  Cannot keep fluids down.     You have a new or higher fever.   Watch closely for changes in your health, and be sure to contact your doctor if:    Your diarrhea is getting worse.     You see pus in the diarrhea.     You are not getting better after 2 days (48 hours).   Where can you learn more?  Go to https://www.[employer].net/patiented  Enter W335 in the search box to learn more about \"Diarrhea: Care Instructions.\"  Current as of: October 19, 2024  Content Version: 14.4    8370-4277 [employer].   Care instructions adapted under license by your healthcare professional. If you have questions about a medical condition or this " instruction, always ask your healthcare professional. [employer] [facility] disclaims any warranty or liability for your use of this information.

## 2025-06-19 ENCOUNTER — TRANSCRIBE ORDERS (OUTPATIENT)
Dept: MATERNAL FETAL MEDICINE | Facility: CLINIC | Age: 35
End: 2025-06-19

## 2025-06-19 ENCOUNTER — VIRTUAL VISIT (OUTPATIENT)
Dept: OBGYN | Facility: CLINIC | Age: 35
End: 2025-06-19
Payer: COMMERCIAL

## 2025-06-19 VITALS — BODY MASS INDEX: 23.49 KG/M2 | HEIGHT: 67 IN

## 2025-06-19 DIAGNOSIS — Z23 NEED FOR DIPHTHERIA-TETANUS-PERTUSSIS (TDAP) VACCINE: ICD-10-CM

## 2025-06-19 DIAGNOSIS — O26.90 PREGNANCY RELATED CONDITION: Primary | ICD-10-CM

## 2025-06-19 DIAGNOSIS — Z87.898 HISTORY OF MASTITIS: ICD-10-CM

## 2025-06-19 DIAGNOSIS — O09.529 ENCOUNTER FOR SUPERVISION OF MULTIGRAVIDA WITH ADVANCED MATERNAL AGE: Primary | ICD-10-CM

## 2025-06-19 PROBLEM — Z34.90 PREGNANCY: Status: RESOLVED | Noted: 2023-09-09 | Resolved: 2025-06-19

## 2025-06-19 PROBLEM — N61.0 MASTITIS: Status: RESOLVED | Noted: 2023-10-16 | Resolved: 2025-06-19

## 2025-06-19 RX ORDER — DEXTROAMPHETAMINE SACCHARATE, AMPHETAMINE ASPARTATE MONOHYDRATE, DEXTROAMPHETAMINE SULFATE AND AMPHETAMINE SULFATE 5; 5; 5; 5 MG/1; MG/1; MG/1; MG/1
CAPSULE, EXTENDED RELEASE ORAL
COMMUNITY
Start: 2025-05-16

## 2025-06-19 RX ORDER — DEXTROAMPHETAMINE SACCHARATE, AMPHETAMINE ASPARTATE, DEXTROAMPHETAMINE SULFATE AND AMPHETAMINE SULFATE 2.5; 2.5; 2.5; 2.5 MG/1; MG/1; MG/1; MG/1
TABLET ORAL
COMMUNITY
Start: 2025-05-16

## 2025-06-19 RX ORDER — LORAZEPAM 0.5 MG/1
TABLET ORAL
COMMUNITY
Start: 2024-12-17

## 2025-06-19 RX ORDER — FERRIC GLYCINATE 18 MG/15ML
10 LIQUID (ML) ORAL DAILY
COMMUNITY
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
## 2025-07-02 LAB
ABO + RH BLD: NORMAL
BLD GP AB SCN SERPL QL: NEGATIVE
SPECIMEN EXP DATE BLD: NORMAL

## 2025-07-03 ENCOUNTER — ANCILLARY PROCEDURE (OUTPATIENT)
Dept: ULTRASOUND IMAGING | Facility: CLINIC | Age: 35
End: 2025-07-03
Payer: COMMERCIAL

## 2025-07-03 ENCOUNTER — PRENATAL OFFICE VISIT (OUTPATIENT)
Dept: OBGYN | Facility: CLINIC | Age: 35
End: 2025-07-03
Payer: COMMERCIAL

## 2025-07-03 VITALS
DIASTOLIC BLOOD PRESSURE: 68 MMHG | HEART RATE: 60 BPM | WEIGHT: 158.3 LBS | OXYGEN SATURATION: 100 % | BODY MASS INDEX: 24.79 KG/M2 | SYSTOLIC BLOOD PRESSURE: 115 MMHG

## 2025-07-03 DIAGNOSIS — O09.529 ENCOUNTER FOR SUPERVISION OF MULTIGRAVIDA WITH ADVANCED MATERNAL AGE: ICD-10-CM

## 2025-07-03 DIAGNOSIS — Z12.4 SCREENING FOR CERVICAL CANCER: Primary | ICD-10-CM

## 2025-07-03 LAB
ALBUMIN UR-MCNC: NEGATIVE MG/DL
APPEARANCE UR: CLEAR
BILIRUB UR QL STRIP: NEGATIVE
COLOR UR AUTO: YELLOW
ERYTHROCYTE [DISTWIDTH] IN BLOOD BY AUTOMATED COUNT: 12.6 % (ref 10–15)
EST. AVERAGE GLUCOSE BLD GHB EST-MCNC: 103 MG/DL
GLUCOSE UR STRIP-MCNC: NEGATIVE MG/DL
HBA1C MFR BLD: 5.2 % (ref 0–5.6)
HBV SURFACE AG SERPL QL IA: NONREACTIVE
HCT VFR BLD AUTO: 39 % (ref 35–47)
HCV AB SERPL QL IA: NONREACTIVE
HGB BLD-MCNC: 13 G/DL (ref 11.7–15.7)
HGB UR QL STRIP: NEGATIVE
HIV 1+2 AB+HIV1 P24 AG SERPL QL IA: NONREACTIVE
KETONES UR STRIP-MCNC: NEGATIVE MG/DL
LEUKOCYTE ESTERASE UR QL STRIP: NEGATIVE
MCH RBC QN AUTO: 30.3 PG (ref 26.5–33)
MCHC RBC AUTO-ENTMCNC: 33.3 G/DL (ref 31.5–36.5)
MCV RBC AUTO: 91 FL (ref 78–100)
NITRATE UR QL: NEGATIVE
PH UR STRIP: 6.5 [PH] (ref 5–7)
PLATELET # BLD AUTO: 213 10E3/UL (ref 150–450)
RBC # BLD AUTO: 4.29 10E6/UL (ref 3.8–5.2)
RUBV IGG SERPL QL IA: 20.4 INDEX
RUBV IGG SERPL QL IA: POSITIVE
SP GR UR STRIP: <=1.005 (ref 1–1.03)
T PALLIDUM AB SER QL: NONREACTIVE
UROBILINOGEN UR STRIP-ACNC: 0.2 E.U./DL
WBC # BLD AUTO: 5.3 10E3/UL (ref 4–11)

## 2025-07-03 PROCEDURE — 76801 OB US < 14 WKS SINGLE FETUS: CPT | Performed by: OBSTETRICS & GYNECOLOGY

## 2025-07-03 PROCEDURE — 76817 TRANSVAGINAL US OBSTETRIC: CPT | Performed by: OBSTETRICS & GYNECOLOGY

## 2025-07-03 NOTE — PROGRESS NOTES
SUBJECTIVE:     HPI:    This is a 35 year old female patient,  who presents for her first obstetrical visit.    SABINA: 2026, by Last Menstrual Period.  She is 8w0d weeks.  Her cycles are regular.  Her last menstrual period was normal.   Since her LMP, she has experienced  nausea, emesis, and loose stools).   She denies dysuria and vaginal bleeding.    Additional History:   -AMA  -hx 1 c/s at term- dysfunctional labor      HISTORY:   Planned Pregnancy: Yes  Marital Status:   Occupation: [employer] [place] [employer]  Living in Household: Spouse and Children Graham    Past History:  Her past medical history   Past Medical History:   Diagnosis Date    ADHD (attention deficit hyperactivity disorder)     Mastitis 10/16/2023    Trauma and stressor-related disorder 2016   .      She has a history of  prior  section    Since her last LMP she denies use of alcohol, tobacco and street drugs.    Past medical, surgical, social and family history were reviewed and updated in [facility].        Current Outpatient Medications   Medication Sig Dispense Refill    amphetamine-dextroamphetamine (ADDERALL) 10 MG tablet       Iron 18 MG/15ML LIQD Take 10 mLs by mouth daily.      Prenatal Vit-DSS-Fe Cbn-FA (PRENATAL AD PO) Take by mouth daily.      amphetamine-dextroamphetamine (ADDERALL XR) 20 MG 24 hr capsule  (Patient not taking: Reported on 2025)      LORazepam (ATIVAN) 0.5 MG tablet TAKE 1 TABLETS BY ORAL ROUTE 1 TIMES PER DAY AT BEDTIME AS NEEDED FOR SLEEP (Patient not taking: Reported on 2025)       No current facility-administered medications for this visit.       ROS:   12 point review of systems negative other than symptoms noted below or in the HPI.      OBJECTIVE:     EXAM:  /68 (BP Location: Right arm, Patient Position: Sitting, Cuff Size: Adult Regular)   Pulse 60   Wt 71.8 kg (158 lb 4.8 oz)   LMP 2025   SpO2 100%   BMI 24.79 kg/m   Body mass index is 24.79 kg/m .    GENERAL:  alert and no distress  EYES: Eyes grossly normal to inspection, PERRL and conjunctivae and sclerae normal  NECK: no adenopathy, no asymmetry, masses, or scars  RESP: RRR  CV: regular rate and rhythm,no peripheral edema  ABDOMEN: soft, nontender, no hepatosplenomegaly, no masses and bowel sounds normal  MS: no gross musculoskeletal defects noted, no edema  SKIN: no suspicious lesions or rashes  NEURO: Normal strength and tone, mentation intact and speech normal  PSYCH: mentation appears normal, affect normal/bright    ASSESSMENT/PLAN:       ICD-10-CM    1. Screening for cervical cancer  Z12.4 HPV and Gynecologic Cytology Panel - Recommended Age 30-65 Years      2. Encounter for supervision of multigravida with advanced maternal age  O09.529 ABO/Rh type and screen     Hepatitis B surface antigen     CBC with platelets     HIV Antigen Antibody Combo     Rubella Antibody IgG     Treponema Abs w Reflex to RPR and Titer     Urine Culture Aerobic Bacterial     Hemoglobin A1c     Hepatitis C antibody     UA without Microscopic - lab collect          35 year old , 8w0d weeks of pregnancy with SABINA of 2026, by Last Menstrual Period    Discussed as follows:  -nob labs today  -pap today  -gc   -oriented to pnc- setup  -FV MD- delivery RD  -lvl 2 fas 18-20  -RN triage vs mychart   -flu covid tdap RSV in pregnancy recc  -still bf Graham- ok to continue or wean, supply my drop, hx mastitis call with any sx    Counseling given:   - Follow up in 4-6 weeks for return OB visit.  - Recommended weight gain for pregnancy: 25-35 lbs.         PLAN/PATIENT INSTRUCTIONS:    Rtc 4 wks      JASPREET Oviedo CNP

## 2025-07-07 LAB
HPV HR 12 DNA CVX QL NAA+PROBE: NEGATIVE
HPV16 DNA CVX QL NAA+PROBE: NEGATIVE
HPV18 DNA CVX QL NAA+PROBE: NEGATIVE
HUMAN PAPILLOMA VIRUS FINAL DIAGNOSIS: NORMAL

## 2025-07-09 LAB
BKR AP ASSOCIATED HPV REPORT: NORMAL
BKR LAB AP GYN ADEQUACY: NORMAL
BKR LAB AP GYN INTERPRETATION: NORMAL
BKR LAB AP PREVIOUS ABNORMAL: NORMAL
PATH REPORT.COMMENTS IMP SPEC: NORMAL
PATH REPORT.COMMENTS IMP SPEC: NORMAL
PATH REPORT.RELEVANT HX SPEC: NORMAL

## 2025-07-13 ENCOUNTER — HEALTH MAINTENANCE LETTER (OUTPATIENT)
Age: 35
End: 2025-07-13

## 2025-07-30 ENCOUNTER — OFFICE VISIT (OUTPATIENT)
Dept: MATERNAL FETAL MEDICINE | Facility: CLINIC | Age: 35
End: 2025-07-30
Attending: STUDENT IN AN ORGANIZED HEALTH CARE EDUCATION/TRAINING PROGRAM
Payer: COMMERCIAL

## 2025-07-30 ENCOUNTER — LAB (OUTPATIENT)
Dept: LAB | Facility: CLINIC | Age: 35
End: 2025-07-30
Attending: STUDENT IN AN ORGANIZED HEALTH CARE EDUCATION/TRAINING PROGRAM
Payer: COMMERCIAL

## 2025-07-30 ENCOUNTER — HOSPITAL ENCOUNTER (OUTPATIENT)
Dept: ULTRASOUND IMAGING | Facility: CLINIC | Age: 35
Discharge: HOME | End: 2025-07-30
Attending: STUDENT IN AN ORGANIZED HEALTH CARE EDUCATION/TRAINING PROGRAM
Payer: COMMERCIAL

## 2025-07-30 ENCOUNTER — PRENATAL OFFICE VISIT (OUTPATIENT)
Dept: OBGYN | Facility: CLINIC | Age: 35
End: 2025-07-30
Payer: COMMERCIAL

## 2025-07-30 VITALS
DIASTOLIC BLOOD PRESSURE: 74 MMHG | WEIGHT: 161.8 LBS | HEART RATE: 65 BPM | BODY MASS INDEX: 25.34 KG/M2 | OXYGEN SATURATION: 100 % | SYSTOLIC BLOOD PRESSURE: 113 MMHG

## 2025-07-30 DIAGNOSIS — Z36.9 ENCOUNTER FOR ANTENATAL SCREENING: ICD-10-CM

## 2025-07-30 DIAGNOSIS — O26.90 PREGNANCY RELATED CONDITION: ICD-10-CM

## 2025-07-30 DIAGNOSIS — O09.521 MULTIGRAVIDA OF ADVANCED MATERNAL AGE IN FIRST TRIMESTER: Primary | ICD-10-CM

## 2025-07-30 DIAGNOSIS — Z31.5 ENCOUNTER FOR PROCREATIVE GENETIC COUNSELING: ICD-10-CM

## 2025-07-30 DIAGNOSIS — O09.529 ANTEPARTUM MULTIGRAVIDA OF ADVANCED MATERNAL AGE: Primary | ICD-10-CM

## 2025-07-30 DIAGNOSIS — Z36.0 ENCOUNTER FOR ANTENATAL SCREENING FOR CHROMOSOMAL ANOMALIES: ICD-10-CM

## 2025-07-30 DIAGNOSIS — O09.529 ENCOUNTER FOR SUPERVISION OF MULTIGRAVIDA WITH ADVANCED MATERNAL AGE: Primary | ICD-10-CM

## 2025-07-30 DIAGNOSIS — O09.521 MULTIGRAVIDA OF ADVANCED MATERNAL AGE IN FIRST TRIMESTER: ICD-10-CM

## 2025-07-30 PROCEDURE — 0502F SUBSEQUENT PRENATAL CARE: CPT | Performed by: OBSTETRICS & GYNECOLOGY

## 2025-07-30 PROCEDURE — 3078F DIAST BP <80 MM HG: CPT | Performed by: OBSTETRICS & GYNECOLOGY

## 2025-07-30 PROCEDURE — 3074F SYST BP LT 130 MM HG: CPT | Performed by: OBSTETRICS & GYNECOLOGY

## 2025-07-30 PROCEDURE — 999N000069 HC STATISTIC GENETIC COUNSELING, < 16 MIN

## 2025-07-30 PROCEDURE — 76813 OB US NUCHAL MEAS 1 GEST: CPT

## 2025-07-30 PROCEDURE — 36415 COLL VENOUS BLD VENIPUNCTURE: CPT

## 2025-07-30 PROCEDURE — 99207 PR PRENATAL VISIT: CPT | Performed by: OBSTETRICS & GYNECOLOGY

## 2025-07-30 ASSESSMENT — PATIENT HEALTH QUESTIONNAIRE - PHQ9
10. IF YOU CHECKED OFF ANY PROBLEMS, HOW DIFFICULT HAVE THESE PROBLEMS MADE IT FOR YOU TO DO YOUR WORK, TAKE CARE OF THINGS AT HOME, OR GET ALONG WITH OTHER PEOPLE: NOT DIFFICULT AT ALL
SUM OF ALL RESPONSES TO PHQ QUESTIONS 1-9: 3
SUM OF ALL RESPONSES TO PHQ QUESTIONS 1-9: 3

## 2025-07-30 NOTE — PROGRESS NOTES
Here with spouse and had normal first tri u/s today, NIPT pending and going to find out fetal sex. Discussed AFP next visit. Prior c/s and plan repeat but she had high spinal so it was scary. Discussed 39+ wk repeat. Discussed usual 2nd pregnancy changes and reassured. RTC 4 weeks. BE

## 2025-07-30 NOTE — PROGRESS NOTES
The patient was seen for an [facility] today.  For a detailed report of the ultrasound examination, please see the ultrasound report which can be found under the imaging tab.    If you have questions regarding today's evaluation or if we can be of further service, please contact the [facility].    Lars Shaikh M.D.  Maternal Fetal-Medicine Specialist

## 2025-07-30 NOTE — NURSING NOTE
Pt at [facility] for GC/NT ultrasound.  Pt denies bleeding, cramping and other concerns.  Reports occasional headache and wondering what medications she can take.  Safe medication in pregnancy handout given to pt.  Pt desires NIPT- test kit and directions to lab given to pt.  SBAR given to MD.

## 2025-07-30 NOTE — PROGRESS NOTES
[facility]  Genetic Counseling Consult    Patient:  Zara Kaminski  Preferred Name: Zara YOB: 1990   Date of Service:  25   MRN: [id number]    Zara was seen at the [facility] for genetic consultation. The indication for genetic counseling is advanced maternal age. The patient was accompanied to this visit by their partner, Ej.    The session was conducted in English.      IMPRESSION/ PLAN   1. Zara has not had genetic screening in this pregnancy but elected to have screening today.     2. During today's [facility] visit, Zara had a blood draw for expanded non-invasive prenatal testing (also called NIPT, NIPS, or cell-free DNA) through [employer] The expanded NIPT screens for trisomy 21, 18, and 13 and select microdeletion syndromes, including 22q11.2 deletion syndrome. The patient opted to screen for sex chromosome aneuploidies, including reported fetal sex. Results are expected in 7-14 days. The patient will be called with results and if they do not answer they requested a detailed message with results on their voicemail, including the predicted fetal sex information.  Patient was informed that results, including fetal sex, will be available in [employer].    3. Since the patient chose aneuploidy screening via NIPT, quad screen is NOT recommended in the second trimester. If the patient desires screening for open neural tube defects, maternal serum AFP only is recommended, ideally between 16-18 weeks gestation.    4. Zara had a nuchal translucency ultrasound today. Please see the ultrasound report for further details.    5. Further recommendations include a fetal anatomy level II ultrasound with [facility]. The upcoming ultrasound has been scheduled for 10/02/2025.    PREGNANCY HISTORY   /Parity:       Zara's pregnancy history is significant for:   One term, male, complicated by dysfunctional labor  "(Sept. 2023)    CURRENT PREGNANCY   Current Age: 35 year old   Age at Delivery: 35 year old  SABINA: 2/12/2026, by Last Menstrual Period                                   Gestational Age: 11w6d  This pregnancy is a single gestation.   This pregnancy was conceived spontaneously.    MEDICAL HISTORY   Zara s reported medical history is not expected to impact pregnancy management or risks to fetal development.       FAMILY HISTORY   A three-generation pedigree was obtained previously by a genetic counselor on 3/2/2023 and updated today. The original and updated version is scanned under the \"Media\" tab in Epic.  The family history was reported by Zara and their partner, Ej.    The following was reported/discussed on 3/2/2023:  Zara reports that her maternal aunt was diagnosed with breast cancer in her early thirties. She reports that her aunt passed away when Zara was in high school. She believes her mother has had genetic testing, but is unsure. She says she will follow-up with her mother. We discussed the family history of breast cancer briefly. Cancer most often occurs by chance, however some families seem to develop cancer more frequently than expected. Everyone has a risk to develop cancer, but individuals may be at an increased risk to develop cancer based on their family history.We discussed that breast cancer <50y is rare and can be associated with inherited cancer predisposition syndromes. Genetic counseling is available for cancer syndromes. Cancer family history, even without genetic testing, can change cancer screening recommendations for family members and aid in insurance coverage for access to them as well. The most informative individuals to complete cancer genetic counseling and genetic testing are those with a personal history of cancer or those closely related to the affected individuals. This may be Zara's mother. If the family wants more information they can contact the [facility] Cancer " "Risk Management Program ([phone]). Physicians can also make referrals at [email or web address] or, if within the [place] system, through [facility] referral for \"Cancer Risk Mgmt/Cancer Genetic Counseling\". Due to Zara's family history of breast cancer it may be reasonable to begin early screening mammograms. Screening mammograms are usually not recommended during pregnancy or while breast feeding including up to six months after. Zara is encouraged to discuss this family history with her medical provider to ensure that screening begins at an appropriate age.  Zara reports that her mother (70y) had surgery to remove her gallbladder.  Zara reports that her father has a thyroid goiter and had skin cancer diagnosed at 68y.  Ej (37y), Zara's partner, reports that he is healthy.  Ej reports that his mother (75y) has had skin cancer.  Ej reports that his father (74y) has cirrhosis of the liver.  Ej reports that his paternal half-sister  at 36 from complications of substance use disorder.  Otherwise, the reported family history is unremarkable for multiple miscarriages, stillbirths, birth defects, intellectual disabilities, known genetic conditions, and consanguinity.    The following updates were reported today:   Zara and Ej reported that their son, Graham, is alive and well    Otherwise, the reported family history is unremarkable for multiple miscarriages, stillbirths, birth defects, intellectual disabilities, known genetic conditions, and consanguinity.       RISK ASSESSMENT FOR INHERITED CONDITIONS AND CARRIER SCREENING OPTIONS   Expanded carrier screening is available to screen for autosomal recessive conditions and X-linked conditions in a large list of genes. Carrier screening does not test the pregnancy but gives a risk assessment for the pregnancy and future pregnancies to have the condition. Expanded carrier screening is designed to identify " carrier status for conditions that are primarily childhood or adolescent onset. Expanded carrier screening does not evaluate for adult-onset conditions such as hereditary cancer syndromes, dementia/ Alzheimer's disease, or cardiovascular disease risk factors. Additionally, expanded carrier screening is not comprehensive for all known genetic diseases or inherited conditions. Carrier screening does not test for all genetic and health conditions or risk factors.     Autosomal recessive conditions happen when a mutation has been inherited from the egg and sperm and include conditions like cystic fibrosis, thalassemia, hearing loss, spinal muscular atrophy, and more. We reviewed that when both biological parents carry a harmful genetic change in a gene associated with autosomal recessive inheritance, each of their pregnancies has a 1 in 4 (25%) chance to be affected by that condition. X-linked conditions happen when a mutation has been inherited from the egg and include conditions like fragile X syndrome.With x-linked conditions, the specific risk generally depends on the chromosomal sex of the fetus, with XY individuals (generally male) being most severely affected.      screening was not reviewed due to focus on other topics.  About [place] Screening    The patient does NOT have a family history of known inherited conditions. This does NOT mean the patient and/or their partner is not a carrier of a condition. Approximately 90% of couples at an increased reproductive risk for an inherited condition have no family history of that condition.     The patient had previous carrier screening for 558 conditions through [employer] in 2023. A copy of the report was available for review today. The patient's partner was reported to have previous carrier screening as well. A copy of the patient's partner report was not available for review today.    Previous carrier screening results were called out on  03/15/2023. Zara was found to be a carrier for [id number] conditions. 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     LMZ29W-ghbqqel conditions (WNT10A: c.682T>A (p.Lcb750Sin)):  These conditions  include autosomal recessive odonto-onycho-dermal dysplasia (OODD) and Dfnöcg-Dtibtq-Bfckkgly syndrome (SSPS) and autosomal dominant isolated tooth agenesis. People who are carriers of the autosomal recessive conditions may be at risk to develop the autosomal dominant condition.   OODD and SSPS refer to a spectrum of features associated with ectodermal dysplasia (ED), which causes abnormal development of the skin, hair, nails, teeth, and sweat glands. Isolated tooth agenesis results in the absence of one or more teeth, typically secondary teeth. Some individuals with tooth agenesis can have mild symptoms of ED.  This particular variant is a low penetrance variant, meaning that not all individuals with this variant will have associated symptoms.   Since Ej was NOT identified to be a carrier of this condition, the residual reproductive risk for the autosomal recessive conditions is 1 in 131,600.     POLG-related conditions (c.752C>T (p.Rhk809Zja)):  Mutations in this gene are associated with several different conditions with different inheritance patters. Recessive forms of this condition include Alpers-Huttenlocher syndrome (AHS), childhood myocerebrohepatopathy spectrum (MCHS),  myoclonic epilepsy myopathy sensory ataxia (MEMSA), ataxia neuropathy spectrum (ANS), and progressive external ophthalmoplegia (arPEO). The dominant form of the condition is progressive external ophthalmoplegia with mitochondrial DNA deletions (adPEO).  This variant is associated with autosomal recessive disease.   Symptoms can include seizures, psychomotor regression, liver failure, developmental delay, pancreatitis, hearing loss, ataxia, ophthalmoplegia, and others.   Since Zara was NOT identified to be a carrier of this  condition, the couple's residual reproductive risk is 1 in 8,960.    RISK ASSESSMENT FOR CHROMOSOME CONDITIONS   We explained that the risk for fetal chromosome abnormalities increases with maternal age. We discussed specific features of common chromosome abnormalities, including trisomy 21 (Down syndrome), trisomy 13, trisomy 18, and sex chromosome trisomies.    At age 35 at midtrimester, the risk to have a baby with Down syndrome is 1 in 168.   At age 35 at midtrimester, the risk to have a baby with any chromosome abnormality is 1 in 82.     Zara has not had genetic screening in this pregnancy but elected to have screening today.      GENETIC TESTING OPTIONS   Genetic testing during a pregnancy includes screening and diagnostic procedures.      Screening tests are non-invasive which means no risk to the pregnancy and includes ultrasounds and blood work. The benefits and limitations of screening were reviewed. Screening tests provide a risk assessment (chance) specific to the pregnancy for certain fetal chromosome abnormalities but cannot definitively diagnose or exclude a fetal chromosome abnormality. Follow-up genetic counseling and consideration of diagnostic testing is recommended with any abnormal screening result. Diagnostic testing during a pregnancy is more certain and can test for more conditions. However, the tests do have a risk of miscarriage that requires careful consideration. These tests can detect fetal chromosome abnormalities with greater than 99% certainty. Results can be compromised by maternal cell contamination or mosaicism and are limited by the resolution of current genetic testing technology.     There is no screening or diagnostic test that detects all forms of birth defects or intellectual disability.     We discussed the following screening options:     Non-invasive prenatal testing (NIPT)  Also called cell-free DNA screening because it detects chromosomes from the placenta in the  pregnant person's blood  Can be done any time after 10 weeks gestation  Standard recommendation for NIPT screens for trisomy 21, trisomy 18, trisomy 13, with the option of adding sex chromosome aneuploidies, without or without predicted sex  Cannot screen for open neural tube defects, maternal serum AFP after 15 weeks is recommended  New NIPT options include screening for other trisomies, microdeletion syndromes, and in some cases fetal blood antigens. Guidelines do not recommend these conditions are included in standard screening. These options have limitations and should be discussed with a genetic counselor.   However, current (2023) ACMG guidelines do recommend that screening for one microdeletion syndrome, called 22q11.2 deletion syndrome be offered to all pregnant patients. 22q11.2 deletion syndrome has an estimated prevalence of 1 in 990 to 1 in 2148 (0.05-0.1%). Risk is not thought to increase with maternal age. Clinical features are variable but include congenital heart defects, cleft palate, developmental delays, immune system deficiencies, and hearing loss. Approximately 90% of cases are de marjorie (a sporadic new change in a pregnancy). Cell-free DNA screening for 22q11.2 deletion syndrome is available with the inclusion of other microdeletion syndromes. There is less data about the performance of cell-free DNA screening for more rare microdeletions and the chance for false positives or negative may be increased.  We discussed the limitations of cell-free DNA screening in detecting microdeletions and the possibility of false positives and false negatives. The patient opted into microdeletion syndrome screening.     We discussed the following ultrasound options:    Nuchal translucency (NT) ultrasound  Ultrasound between 19x6p-63y6b that includes nuchal translucency measurement and nasal bone assessments  Nuchal translucency refers to the space at the back of the neck where fluid builds up. All babies at this  stage have fluid and there is only concern if there is too much fluid  Nasal bone refers to the small bone in the nose. There is concern for conditions like Down syndrome if the bone cannot be seen at all  This ultrasound can be done as part of first trimester screening, at the same time as another screen (NIPT), at the same time as a CVS, or if the patients does not want genetic screening.  Markers on ultrasound detects about 70% of pregnancies with aneuploidy  Abnormalities on NT ultrasound can also increase the risk for a birth defect, like a heart defect    Comprehensive level II ultrasound (Fetal Anatomy Ultrasound)  Ultrasound done between 18-20 weeks gestation  Screens for major birth defects and markers for aneuploidy (like trisomy 21 and trisomy 18)  Includes looking at the fetus/baby's growth, heart, organs (stomach, kidneys), placenta, and amniotic fluid    We discussed the following diagnostic options:     Chorionic villus sampling (CVS)  Invasive diagnostic procedure done between 10w0d and 13w6d  The procedure collects a small sample from the placenta for the purpose of chromosomal testing and/or other genetic testing  Diagnostic result; more than 99% sensitivity for fetal chromosome abnormalities  Cannot screen for open neural tube defects, maternal serum AFP after 15 weeks is recommended    Amniocentesis  Invasive diagnostic procedure done after 15 weeks gestation  The procedure collects a small sample of amniotic fluid for the purpose of chromosomal testing and/or other genetic testing  Diagnostic result; more than 99% sensitivity for fetal chromosome abnormalities  Testing for AFP in the amniotic fluid can test for open neural tube defects    It was a pleasure to be involved with Zara moreno care. I spent 30 minutes on the date of the encounter doing chart review, obtaining history, test coordination, documentation, and further activities as noted above.    Aimee Nieto GC, MS  Genetic Counselor  Intern  [facility]  Maternal Fetal Medicine  Office: [phone]  [facility]: [phone]   Fax: [phone]  [facility]    Patient seen, evaluated and discussed with the Genetic Counseling Intern. I have verified the content of the note, which accurately reflects my assessment of the patient and the plan of care.   Supervising Genetic Counselor     Vladislav Baig MS, [facility]  Board Certified and [place] Licensed Genetic Counselor  [facility]  Maternal Fetal Medicine  Office: [phone]  [facility]: [phone]   Fax: [phone]  [facility]

## 2025-08-05 ENCOUNTER — TELEPHONE (OUTPATIENT)
Dept: MATERNAL FETAL MEDICINE | Facility: CLINIC | Age: 35
End: 2025-08-05
Payer: COMMERCIAL

## 2025-08-06 LAB — SCANNED LAB RESULT: NORMAL

## (undated) DEVICE — SU MONOCRYL 4-0 PS-2 18" UND Y496G

## (undated) DEVICE — GLOVE PROTEXIS BLUE W/NEU-THERA 7.0  2D73EB70

## (undated) DEVICE — SU MONOCRYL 0 CT-1 36" Y346H

## (undated) DEVICE — SU PLAIN 2-0 CT 27" 853H

## (undated) DEVICE — DRSG ABDOMINAL 07 1/2X8" 7197D

## (undated) DEVICE — PREP CHLORAPREP 26ML TINTED ORANGE  260815

## (undated) DEVICE — ESU GROUND PAD UNIVERSAL W/O CORD

## (undated) DEVICE — SOL NACL 0.9% IRRIG 1000ML BOTTLE 07138-09

## (undated) DEVICE — SOL WATER IRRIG 1000ML BOTTLE 07139-09

## (undated) DEVICE — GLOVE ESTEEM POWDER FREE SMT 6.5  2D72PT65

## (undated) DEVICE — BARRIER INTERCEED 3X4" 4350

## (undated) DEVICE — CATH TRAY FOLEY 16FR BARDEX W/DRAIN BAG STATLOCK 300316A

## (undated) DEVICE — PACK C-SECTION LF PL15OTA83B

## (undated) DEVICE — STRAP KNEE/BODY 31143004

## (undated) DEVICE — DRSG STERI STRIP 1/4X3" R1541

## (undated) DEVICE — STOCKING SLEEVE COMPRESSION CALF LG

## (undated) DEVICE — SU VICRYL 0 CT-1 36" J346H

## (undated) RX ORDER — KETOROLAC TROMETHAMINE 30 MG/ML
INJECTION, SOLUTION INTRAMUSCULAR; INTRAVENOUS
Status: DISPENSED
Start: 2023-09-10

## (undated) RX ORDER — BUPIVACAINE HYDROCHLORIDE 2.5 MG/ML
INJECTION, SOLUTION EPIDURAL; INFILTRATION; INTRACAUDAL
Status: DISPENSED
Start: 2023-09-10

## (undated) RX ORDER — FENTANYL CITRATE 50 UG/ML
INJECTION, SOLUTION INTRAMUSCULAR; INTRAVENOUS
Status: DISPENSED
Start: 2023-09-10

## (undated) RX ORDER — MORPHINE SULFATE 1 MG/ML
INJECTION, SOLUTION EPIDURAL; INTRATHECAL; INTRAVENOUS
Status: DISPENSED
Start: 2023-09-10